# Patient Record
Sex: FEMALE | Race: WHITE | URBAN - METROPOLITAN AREA
[De-identification: names, ages, dates, MRNs, and addresses within clinical notes are randomized per-mention and may not be internally consistent; named-entity substitution may affect disease eponyms.]

---

## 2019-06-11 ENCOUNTER — OFFICE VISIT (OUTPATIENT)
Dept: FAMILY MEDICINE CLINIC | Age: 44
End: 2019-06-11
Payer: COMMERCIAL

## 2019-06-11 VITALS
SYSTOLIC BLOOD PRESSURE: 160 MMHG | HEIGHT: 65 IN | RESPIRATION RATE: 18 BRPM | DIASTOLIC BLOOD PRESSURE: 100 MMHG | BODY MASS INDEX: 48.82 KG/M2 | WEIGHT: 293 LBS | HEART RATE: 80 BPM | TEMPERATURE: 98.4 F | OXYGEN SATURATION: 98 %

## 2019-06-11 DIAGNOSIS — G89.29 CHRONIC RIGHT SHOULDER PAIN: Primary | ICD-10-CM

## 2019-06-11 DIAGNOSIS — M25.511 CHRONIC RIGHT SHOULDER PAIN: Primary | ICD-10-CM

## 2019-06-11 DIAGNOSIS — Z13.31 POSITIVE DEPRESSION SCREENING: ICD-10-CM

## 2019-06-11 DIAGNOSIS — D17.0 LIPOMA OF HEAD: ICD-10-CM

## 2019-06-11 DIAGNOSIS — L98.0 PYOGENIC GRANULOMA: ICD-10-CM

## 2019-06-11 DIAGNOSIS — R03.0 ELEVATED BP WITHOUT DIAGNOSIS OF HYPERTENSION: ICD-10-CM

## 2019-06-11 PROCEDURE — 99203 OFFICE O/P NEW LOW 30 MIN: CPT | Performed by: NURSE PRACTITIONER

## 2019-06-11 PROCEDURE — G0444 DEPRESSION SCREEN ANNUAL: HCPCS | Performed by: NURSE PRACTITIONER

## 2019-06-11 PROCEDURE — G8431 POS CLIN DEPRES SCRN F/U DOC: HCPCS | Performed by: NURSE PRACTITIONER

## 2019-06-11 RX ORDER — PREDNISONE 10 MG/1
TABLET ORAL
Qty: 20 TABLET | Refills: 0 | Status: SHIPPED | OUTPATIENT
Start: 2019-06-11 | End: 2019-06-21 | Stop reason: SDUPTHER

## 2019-06-11 ASSESSMENT — PATIENT HEALTH QUESTIONNAIRE - PHQ9
5. POOR APPETITE OR OVEREATING: 1
SUM OF ALL RESPONSES TO PHQ9 QUESTIONS 1 & 2: 6
SUM OF ALL RESPONSES TO PHQ QUESTIONS 1-9: 19
1. LITTLE INTEREST OR PLEASURE IN DOING THINGS: 3
SUM OF ALL RESPONSES TO PHQ QUESTIONS 1-9: 19
6. FEELING BAD ABOUT YOURSELF - OR THAT YOU ARE A FAILURE OR HAVE LET YOURSELF OR YOUR FAMILY DOWN: 3
4. FEELING TIRED OR HAVING LITTLE ENERGY: 3
10. IF YOU CHECKED OFF ANY PROBLEMS, HOW DIFFICULT HAVE THESE PROBLEMS MADE IT FOR YOU TO DO YOUR WORK, TAKE CARE OF THINGS AT HOME, OR GET ALONG WITH OTHER PEOPLE: 1
7. TROUBLE CONCENTRATING ON THINGS, SUCH AS READING THE NEWSPAPER OR WATCHING TELEVISION: 3
3. TROUBLE FALLING OR STAYING ASLEEP: 3
2. FEELING DOWN, DEPRESSED OR HOPELESS: 3
9. THOUGHTS THAT YOU WOULD BE BETTER OFF DEAD, OR OF HURTING YOURSELF: 0
8. MOVING OR SPEAKING SO SLOWLY THAT OTHER PEOPLE COULD HAVE NOTICED. OR THE OPPOSITE, BEING SO FIGETY OR RESTLESS THAT YOU HAVE BEEN MOVING AROUND A LOT MORE THAN USUAL: 0

## 2019-06-11 NOTE — PATIENT INSTRUCTIONS
your doctor may remove a lipoma if it is painful, gets infected, or bothers you. Follow-up care is a key part of your treatment and safety. Be sure to make and go to all appointments, and call your doctor if you are having problems. It's also a good idea to know your test results and keep a list of the medicines you take. How can you care for yourself at home? · A lipoma usually needs no care at home unless your doctor made a cut (incision) to remove it. · If your doctor told you how to care for your incision, follow your doctor's instructions. If you did not get instructions, follow this general advice:  ? Wash around the incision with clean water 2 times a day. Don't use hydrogen peroxide or alcohol. These can slow healing. ? You may cover the incision with a thin layer of petroleum jelly, such as Vaseline, and a nonstick bandage. ? Apply more petroleum jelly and replace the bandage as needed. When should you call for help? Call your doctor now or seek immediate medical care if:    · You have signs of infection, such as:  ? Increased pain, swelling, warmth, or redness. ? Red streaks leading from the lipoma. ? Pus draining from the lipoma. ? A fever.    Watch closely for changes in your health, and be sure to contact your doctor if:    · The lipoma is growing or changing.     · You do not get better as expected. Where can you learn more? Go to https://Attributor.[employer]. org and sign in to your Eayun account. Enter Z234 in the ZapMe box to learn more about \"Lipoma: Care Instructions. \"     If you do not have an account, please click on the \"Sign Up Now\" link. Current as of: April 17, 2018  Content Version: 12.0  © 4322-3066 Healthwise, Incorporated. Care instructions adapted under license by [facility] [facility] ([facility]).  If you have questions about a medical condition or this instruction, always ask your healthcare professional. Geremias Parrish disclaims any warranty or liability for your use of this information. Patient Education        Learning About Benign Soft Tissue Tumors  What is a benign soft tissue tumor? A soft tissue tumor is a growth of abnormal cells in the body's soft tissues. These tissues include the muscles, lymph and blood vessels, nerves, and fat. They can also include cartilage and other connective tissues. When a tumor is benign (say \"oleg-NYN\"), that means it's not cancer. Most soft tissue tumors are benign. Benign tumors don't spread to other tissues and organs. They usually aren't life-threatening. But they can cause problems if they grow too much, press on nerves, or cause pain. What are some common types of these tumors? Some common types of benign soft tissue tumors include:  Lipomas. These tumors form from fat cells. Angiolipomas are a type of lipoma made up of fat and blood vessels. Nerve sheath tumors. Tumors on a nerve may include schwannomas and neurofibromas. They might need to be removed. Benign synovial tumors. These appear around the tendons, near the knee, hip, elbow, or shoulder. Examples include giant cell tumor of the tendon sheath and synovial chondromatosis. Hemangiomas. These are tumors of the blood vessels. Desmoid tumors. These tumors commonly appear on the shoulder, chest, back, and thighs. Nodular fasciitis. These are most common in the arms. They can grow quickly. Other types of tumors may appear on the skin, belly, arms and legs, organs, and nerves. What are the symptoms? Sometimes a tumor can be felt as a bump under the skin. Or if the tumor is deep enough below the skin, you may not be able to feel it. You may also feel pain near the tumor if it's large or pressing on something. How are these tumors diagnosed? Your doctor will ask you about your symptoms and past health and will examine you. A physical exam can help your doctor diagnose some soft tissue tumors.   Your doctor may find a tumor when taking X-rays or other imaging tests for another problem. If your doctor isn't sure what the growth is and your symptoms could be signs of a tumor, you will get some tests. The tests can help make sure it's not cancer. They can also help your doctor figure out the best treatment for the tumor. · You may have one or more imaging tests to get a better look at the tumor. These may include:  ? X-rays. ? Ultrasound tests. ? CT scan.  ? MRI scan. · You may need blood tests and lab work. · You may need a biopsy so a sample of the tumor can be looked at under a microscope. This sample may also be used to test for biomarkers. They will help with planning treatment. Doctors may also look at other parts of your body for other tumors. How are they treated? Some benign soft tissue tumors that aren't causing problems can be watched over time. Some may remain stable or go away on their own. But if the tumor causes pain, is growing larger, or affects your movement, it may need to be removed. You may also choose to have these tumors removed if they bother you or if you don't like how they look. Doctors may remove some tumors with surgery. In some cases, other treatments, including medicines, may be used. Talk with your doctor or specialist about other types of treatments for the tumor. After your treatment, your doctor may want to check the area again to make sure that the growth doesn't come back. Follow-up care is a key part of your treatment and safety. Be sure to make and go to all appointments, and call your doctor if you are having problems. It's also a good idea to know your test results and keep a list of the medicines you take. Where can you learn more? Go to https://Crocodile Goldmanav.[employer]. org and sign in to your [employer] account. Enter S210 in the [employer] box to learn more about \"Learning About Benign Soft Tissue Tumors. \"     If you do not have an account, please click on the \"Sign Up of a heart attack or stroke. But taking aspirin isn't right for everyone, because it can cause serious bleeding. · See your doctor regularly. You may need to see the doctor more often at first or until your blood pressure comes down. · If you are taking blood pressure medicine, talk to your doctor before you take decongestants or anti-inflammatory medicine, such as ibuprofen. Some of these medicines can raise blood pressure. · Learn how to check your blood pressure at home. Lifestyle changes  · Stay at a healthy weight. This is especially important if you put on weight around the waist. Losing even 10 pounds can help you lower your blood pressure. · If your doctor recommends it, get more exercise. Walking is a good choice. Bit by bit, increase the amount you walk every day. Try for at least 30 minutes on most days of the week. You also may want to swim, bike, or do other activities. · Avoid or limit alcohol. Talk to your doctor about whether you can drink any alcohol. · Try to limit how much sodium you eat to less than 2,300 milligrams (mg) a day. Your doctor may ask you to try to eat less than 1,500 mg a day. · Eat plenty of fruits (such as bananas and oranges), vegetables, legumes, whole grains, and low-fat dairy products. · Lower the amount of saturated fat in your diet. Saturated fat is found in animal products such as milk, cheese, and meat. Limiting these foods may help you lose weight and also lower your risk for heart disease. · Do not smoke. Smoking increases your risk for heart attack and stroke. If you need help quitting, talk to your doctor about stop-smoking programs and medicines. These can increase your chances of quitting for good. When should you call for help? Call 911 anytime you think you may need emergency care. This may mean having symptoms that suggest that your blood pressure is causing a serious heart or blood vessel problem.  Your blood pressure may be over 180/120.   For example, call 911 if:    · You have symptoms of a heart attack. These may include:  ? Chest pain or pressure, or a strange feeling in the chest.  ? Sweating. ? Shortness of breath. ? Nausea or vomiting. ? Pain, pressure, or a strange feeling in the back, neck, jaw, or upper belly or in one or both shoulders or arms. ? Lightheadedness or sudden weakness. ? A fast or irregular heartbeat.     · You have symptoms of a stroke. These may include:  ? Sudden numbness, tingling, weakness, or loss of movement in your face, arm, or leg, especially on only one side of your body. ? Sudden vision changes. ? Sudden trouble speaking. ? Sudden confusion or trouble understanding simple statements. ? Sudden problems with walking or balance. ? A sudden, severe headache that is different from past headaches.     · You have severe back or belly pain.    Do not wait until your blood pressure comes down on its own. Get help right away.   Call your doctor now or seek immediate care if:    · Your blood pressure is much higher than normal (such as 180/120 or higher), but you don't have symptoms.     · You think high blood pressure is causing symptoms, such as:  ? Severe headache.  ? Blurry vision.    Watch closely for changes in your health, and be sure to contact your doctor if:    · Your blood pressure measures higher than your doctor recommends at least 2 times. That means the top number is higher or the bottom number is higher, or both.     · You think you may be having side effects from your blood pressure medicine. Where can you learn more? Go to https://Brightleafmanav.Meetapp. org and sign in to your 500Indies account. Enter Z888 in the KylesP2Binvestor box to learn more about \"High Blood Pressure: Care Instructions. \"     If you do not have an account, please click on the \"Sign Up Now\" link. Current as of: July 22, 2018  Content Version: 12.0  © 9108-4261 Healthwise, Incorporated.  Care instructions adapted under license by [facility] ([facility]). If you have questions about a medical condition or this instruction, always ask your healthcare professional. [address] any warranty or liability for your use of this information.

## 2019-06-11 NOTE — PROGRESS NOTES
SUBJECTIVE:    Patient ID: Mone Sanchez is a 37 y.o. y.o. female. HPI  Chief Complaint   Patient presents with    Established New Doctor     NEW PT- ESTABLISH,    Shoulder Pain     RIGHT SHOULDER PAIN, ATTEMPTED TO  DOG AND FEELS LIKE SOMETHING PULLED, PAIN HAS GONE DONW BUT IS STILL THERE, HAPPENED MID MARCH    Cyst     CYST- HEAD AND BEHIND THE EAR, HAVE BEEN THERE FOR YEARS    HER SISTER  RECENTLY PASSED 5/31- - HAS HAD SOME DEPRESSION IN THE PAST -TRIED SEVERAL MEDICATIONS IN THE PAST  PAXIL- LEXAPRO- CELEXA - [facility] - [facility]- [facility]- THEY  MADE THINGS WORSE- SHE DOES NOT NEED ANY MEDICATION AT THIS TIME AS THE DEPRESSION IS SITUATIONAL-        RIGHT SHOULDER PAIN FOR THE LAST ABOUT 2.5 MONTHS - SHE PICKED HER HER DOG 60 POUNDS UP AND THAT IS WHEN SHE NOTICED THE PAIN- ACHING PAIN WORSE WITH MOVEMENTS PUTTING BRA BRA ON REACHING BEHIND HER BACK - SHE FEELS A JABBING PAIN - RATES THE  PAIN 6/10 WHEN REACHES BEHIND HER BACK- SHE HAS TRIED A BICEPS BRACE THAT HAS SEEMED TO HELP-      SHE HAS A CYST BEHIND HER LEFT EAR SINCE CHILDHOOD - HAS GOTTEN BIGGER  THE LAST FEW YEARS- IT DOES BLEED AT TIMES NOT TENDER TO TOUCH  Review of Systems - ONE ON TP OF HER HEAD GETS TENDER AT TIMES - PRESENT FOR SEVERAL YEARS - IT HAS GOTTEN BIGGER WAS THE SIZE OF A PEA NOW THE SIZE OF A GLENYS  OBJECTIVE:  Vitals:    06/11/19 0851   BP: (!) 140/90   Pulse: 80   Resp: 18   Temp: 98.4 °F (36.9 °C)   SpO2: 98%     Past Medical History:   Diagnosis Date    Anxiety     Depression     Hypertension     PCOS (polycystic ovarian syndrome)       Family History   Problem Relation Age of Onset    Diabetes Mother     Dementia Mother     High Blood Pressure Father     Pancreatic Cancer Father     Coronary Art Dis Sister         CHF    Pancreatic Cancer Sister         PULM HTN    Cancer Brother 29        BRAIN TUMOR          Physical Exam   Constitutional: She appears well-developed. She is active.    MORBIDLY OBESE Cardiovascular: Normal rate, regular rhythm, S1 normal, S2 normal and normal heart sounds. Exam reveals no gallop, no S3, no S4, no distant heart sounds and no friction rub. No murmur heard. No systolic murmur is present. No diastolic murmur is present. Pulmonary/Chest: Effort normal and breath sounds normal.   Neurological: She is alert. Psychiatric: She has a normal mood and affect. Her speech is normal and behavior is normal. Judgment and thought content normal. Cognition and memory are normal.       Halle Nevarez was seen today for established new doctor, shoulder pain and cyst.    Diagnoses and all orders for this visit:    Chronic right shoulder pain  -     XR SHOULDER RIGHT (MIN 2 VIEWS); Future  -     Ambulatory referral to Physical Therapy  -     predniSONE (DELTASONE) 10 MG tablet; 4 tabs x 2 d 3 tabs x 2 d 2 tabs x 2 d 1  tab x 2 d in am with food   avoid NSAIDs while on this medication   Apply cold compresses intermittently as needed. As pain recedes, begin normal activities slowly as tolerated. Call if symptoms persist.    Elevated BP without diagnosis of hypertension  PT IS ENCOURAGED TO MONITOR B/P AT LEAST WEEKLY-  INSTRUCTED TO MAKE AN APPT FOR CPE- WILL RECHECK B/P AT THAT TIME  ENCOURAGED PT TO TRY TO LOSE WEIGHT AS THIS MAY HELP AS WELL  Lipoma of head  Pyogenic granuloma  PT SCHEDULED TO HAVE LESIONS REMOVED WITH DR MONTALVO 6//21    Positive depression screening  -     Positive Screen for Clinical Depression with a Documented Follow-up Plan   On the basis of positive PHQ-9 screening (PHQ-9 Total Score: 19), the following plan was implemented: patient declines further evaluation/treatment for depression. Patient will follow-up in 1 month(s) with PCP. Linda Flores

## 2019-06-21 ENCOUNTER — OFFICE VISIT (OUTPATIENT)
Dept: FAMILY MEDICINE CLINIC | Age: 44
End: 2019-06-21
Payer: COMMERCIAL

## 2019-06-21 VITALS — HEIGHT: 65 IN | BODY MASS INDEX: 63.59 KG/M2

## 2019-06-21 DIAGNOSIS — D18.01 HEMANGIOMA OF SKIN: Primary | ICD-10-CM

## 2019-06-21 DIAGNOSIS — L72.11 PILAR CYST: ICD-10-CM

## 2019-06-21 PROCEDURE — 11102 TANGNTL BX SKIN SINGLE LES: CPT | Performed by: FAMILY MEDICINE

## 2019-06-21 PROCEDURE — 11420 EXC H-F-NK-SP B9+MARG 0.5/<: CPT | Performed by: FAMILY MEDICINE

## 2019-06-21 RX ORDER — PREDNISONE 10 MG/1
TABLET ORAL
Qty: 20 TABLET | Refills: 0 | Status: SHIPPED | OUTPATIENT
Start: 2019-06-21 | End: 2019-07-17

## 2019-06-27 ENCOUNTER — HOSPITAL ENCOUNTER (OUTPATIENT)
Dept: MAMMOGRAPHY | Age: 44
Discharge: HOME | End: 2019-07-02
Payer: COMMERCIAL

## 2019-06-27 DIAGNOSIS — Z12.31 VISIT FOR SCREENING MAMMOGRAM: ICD-10-CM

## 2019-06-27 PROCEDURE — 77067 SCR MAMMO BI INCL CAD: CPT

## 2019-06-28 ENCOUNTER — NURSE ONLY (OUTPATIENT)
Dept: FAMILY MEDICINE CLINIC | Age: 44
End: 2019-06-28

## 2019-06-28 DIAGNOSIS — Z48.02 VISIT FOR SUTURE REMOVAL: Primary | ICD-10-CM

## 2019-06-29 ENCOUNTER — HOSPITAL ENCOUNTER (OUTPATIENT)
Dept: PHYSICAL THERAPY | Age: 44
Setting detail: THERAPIES SERIES
Discharge: HOME | End: 2019-06-29
Payer: COMMERCIAL

## 2019-06-29 ENCOUNTER — HOSPITAL ENCOUNTER (OUTPATIENT)
Dept: GENERAL RADIOLOGY | Age: 44
Discharge: HOME | End: 2019-06-29
Payer: COMMERCIAL

## 2019-06-29 ENCOUNTER — HOSPITAL ENCOUNTER (OUTPATIENT)
Age: 44
Discharge: HOME | End: 2019-06-29
Payer: COMMERCIAL

## 2019-06-29 DIAGNOSIS — G89.29 CHRONIC RIGHT SHOULDER PAIN: ICD-10-CM

## 2019-06-29 DIAGNOSIS — M25.511 CHRONIC RIGHT SHOULDER PAIN: ICD-10-CM

## 2019-06-29 PROCEDURE — 97530 THERAPEUTIC ACTIVITIES: CPT

## 2019-06-29 PROCEDURE — 73030 X-RAY EXAM OF SHOULDER: CPT

## 2019-06-29 PROCEDURE — 97161 PT EVAL LOW COMPLEX 20 MIN: CPT

## 2019-06-29 PROCEDURE — 97110 THERAPEUTIC EXERCISES: CPT

## 2019-06-29 NOTE — PROGRESS NOTES
Physical Therapy  Initial Assessment  Date: 2019  Patient Name: Miguel Angel Delvalle   MRN: [id number]  : 1975     Treatment Diagnosis: R shoulder and lateral arm pain, decreased R shoulder ROM and strength, poor posture, difficulty with upper body dressing (specifically clasping bra) and overhead tasks, decreased endurance with R shoulder     Restrictions  Position Activity Restriction  Other position/activity restrictions: N/A  Outpatient fall risk assessment completed asking screening question if patient has fallen in the past 30 days:  [x] Yes  [] No    Based on screen for falls, patient demonstrates fall risk:  [] Yes  [x] No    Interventions based on fall risk status:  Updated Problem List within Medical History  [] Yes   [x] N/A    Asked family to assist with increased observation of the patient  [] Yes   [x] N/A    Patient kept in visible area when not closely supervised by therapist  [] Yes   [x] N/A    Repeatedly reinforce activity limits and safety needs with patient/family  [] Yes   [x] N/A    Increase frequency of rounding/monitoring patient  [] Yes   [x] N/A        Subjective   General  Chart Reviewed: Yes  Patient assessed for rehabilitation services?: Yes  Additional Pertinent Hx: Anxiety, depression  Family / Caregiver Present: No  Referring Practitioner: RADHA Castillo  Referral Date : 19  Diagnosis: Chronic right shoulder pain  General Comment  Comments: Does have an order for an Xray but has not scheduled it yet  PT Visit Information  Onset Date: (2019)  PT Insurance Information: [facility] [employer] - no visit limit, $35 copay  Subjective  Subjective: Current: Pt reports the shoulder pain started at the end of March. She was taking care of her sick dog (weighed about 60#). She got a ripping sensation at one point, but worked through it and never went to the MD. She finally made an MD appointment The pain has improved slightly but has hit a plateau.  PLOF: No hx of shoulder of pain, hx of tendinitis in R elbow. Independent with all activities. CLOF: Having difficulty with lifting >30#, overhead reaching, clasping bra behind her back. Works as an  which is mostly desk work but occasionally has to lift a box onto a scale to prepare for shipping or reaching a file on a top shelf. R hand dominant. Occasionally dropping pens/pencils. Crochets recreationally, has been wearing a band around the shoulder and just distal to the elbow to complete, but then it hurts for long periods afterwards. Pain: She has radiating discomfort into the lateral upper arm, occasionally gets a burning sensation. Pain is a steady 5/10, with moments of higher pain when she reaches or lifts. Was on a course of prednisone, reports little to no relief. No other treatments for the pain at home.         Vision/Hearing  Vision  Vision: Within Functional Limits  Hearing  Hearing: Within functional limits    Orientation  Orientation  Overall Orientation Status: Within Normal Limits    Social/Functional History  Social/Functional History  Lives With: Alone  Type of Home: House  Home Layout: Two level(laundry on first floor)  Home Access: Stairs to enter without rails  Entrance Stairs - Number of Steps: 1  ADL Assistance: Independent(Having pain with some dressing (clasping bra))  Homemaking Assistance: Independent(able to complete but using L UE more often)  Active : Yes  Mode of Transportation: Car  Occupation: Full time employment  Type of occupation:   Leisure & Hobbies: Shabana, crafting     Objective     Observation/Palpation  Posture: Poor  Palpation: no significant tenderness with palpation of R shoulder   Observation: Rounded shoulders, forward head, poor sitting posture    PROM RUE (degrees)  RUE General PROM: to onset of pain:  R Shoulder Flex  0-180: 120 deg  R Shoulder ABduction 0-180: 90 deg  R Shoulder Int Rotation  0-70: WNL - pain at end range  R Shoulder Ext Rotation  0-90: WNL - no pain  AROM RUE (degrees)  R Shoulder Flexion 0-180: 112 deg  R Shoulder Extension 0-45: 48 deg   R Shoulder ABduction 0-180: 109 deg  R Shoulder Int Rotation  0-70: symmetrical to L UE behind back with reports of a \"pull\" in the top of the shoulder. R Shoulder Ext Rotation 0-90: 50 deg  R Elbow Flexion 0-145: WNL - slight pain along tricep  R Elbow Extension 145-0: WNL - slight pain along tricep  AROM LUE (degrees)  LUE AROM : WFL  LUE General AROM: IR to mid scapular region, ER to C7-T1    Strength RUE  R Shoulder Flexion: 4/5  R Shoulder ABduction: 4/5  R Shoulder Internal Rotation: 4/5  R Shoulder External Rotation: 4/5  R Shoulder Horizontal ABduction: 4/5  R Elbow Flexion: 4+/5  R Elbow Extension: 4+/5  Strength LUE  L Shoulder Flexion: 4+/5  L Shoulder ABduction: 4/5  L Shoulder Internal Rotation: 4+/5  L Shoulder External Rotation: 4+/5  L Shoulder Horizontal ABduction: 4/5  L Elbow Flexion: 4+/5  L Elbow Extension: 4+/5     Additional Measures  Special Tests: (+) Empty Can test on R (pain)  Sensation  Overall Sensation Status: WNL     Hand Dominance  Hand Dominance: Right  Left Hand Strength -  (lbs)  Handle Setting 2: 75#, 72#, 65# (avg 70.7#)  Right Hand Strength -  (lbs)  Handle Setting 2: 68#, 70#, 68# (avg 68.7#)    Assessment   Conditions Requiring Skilled Therapeutic Intervention  Body structures, Functions, Activity limitations: Decreased functional mobility ; Decreased ROM; Decreased strength; Increased Pain  Assessment: Pt is a 37year old female referred to physical therapy for right shoulder pain, AUNDREA was lifting her 60# dog and felt a \"ripping\" sensation. She presents to PT today with a decrease in AROM and strength of the R shoulder, as well as poor posture and mild decrease in R  strength. These deficits are impacting her ability to complete daily and recreational activities and therefore affecting her quality of life.  She did have a positive Empty Can test, but would benefit from further testing at her next therapy visit. She will benefit from a course of physical therapy to addres the abvove deficits and return to PLOF. Treatment Diagnosis: R shoulder and lateral arm pain, decreased R shoulder ROM and strength, poor posture, difficulty with upper body dressing (specifically clasping bra) and overhead tasks, decreased endurance with R shoulder   Prognosis: Good  Decision Making: Low Complexity  REQUIRES PT FOLLOW UP: Yes  Activity Tolerance  Activity Tolerance: Patient Tolerated treatment well         Plan   Plan  Times per week: 1x  Plan weeks: 6 weeks  Current Treatment Recommendations: Strengthening, ROM, Manual Therapy - Joint Manipulation, Neuromuscular Re-education, Pain Management, Home Exercise Program, Manual Therapy - Soft Tissue Mobilization      OutComes Score                 QuickDASH Total Score: 26 (06/29/19 1213)       QuickDASH Disability/Symptom Score : 34.09 % (06/29/19 1213)                      Goals  Long term goals  Time Frame for Long term goals : 6 weeks  Long term goal 1: Pt to increase R shoulder AROM flex and abd to 140 deg painfree for improved ability to complete OH motions. Long term goal 2: Pt to increase gross R shoulder strength to 4+/5 for improved ability to lift boxes at work. Long term goal 3: Pt to demo good resting posture without verbal cueing for reduced strain on tissues and lowered pain levels. Long term goal 4: Pt to return to hobbies for 1.5 hrs at a time without increase in pain above 2/10.    Patient Goals   Patient goals : Get back to hobbies without pain       [place] Sophia, [address] [facility] [facility] [address]

## 2019-06-29 NOTE — FLOWSHEET NOTE
Physical Therapy Daily Treatment Note  Date:  2019    Patient Name:  Paco Serrano    :  1975  MRN: [id number]  Restrictions/Precautions:    Medical/Treatment Diagnosis Information:   Diagnosis: Chronic right shoulder pain  Treatment Diagnosis: R shoulder and lateral arm pain, decreased R shoulder ROM and strength, poor posture, difficulty with upper body dressing (specifically clasping bra) and overhead tasks, decreased endurance with R shoulder     Tracking Information:  Physician Information Referring Practitioner: RADHA Rosales     Plan of Care Sent Date: 19 Signed Received:    Visit Count / Total Visits      Insurance Approved Visits  med necessity  Approved Dates:     Insurance Information PT Insurance Information: Vishal Arizmendis - no visit limit, $35 copay     Progress Note/G-codes   [x]  Yes  []  No Next Due:  visit     Pain level: 5/10     Subjective:  See eval    Objective:   Observation:   Test measurements:  Next visit - perform additional RTC testing     Exercises:  Exercise/Equipment Resistance/Repetitions Other comments   UBE     Pulleys     TB     Wall activities for ROM     Scap strengthening/posture re-ed                                                     Other Therapeutic Activities:    19 Pt was educated on PT POC, Diagnosis, Prognosis, pathomechanics as well as frequency and duration of scheduling future physical therapy appointments. Time was also taken on this day to answer all patient questions and participation in PT. Home Exercise Program:    19 Patient was educated on home exercise program including distrubution of handout describing exercises, sets, repetitions, frequency and intensity. Exercises/activities include: scap retraction, doorway stretch, wall walks for shoulder flexion.      Manual Treatments:      Modalities:      Timed Code Treatment Minutes:  39    Total Treatment Minutes:  53    Treatment/Activity Tolerance:  [x] Patient tolerated treatment well [] Patient limited by fatigue  [] Patient limited by pain  [] Patient limited by other medical complications  [] Other:     Prognosis: [x] Good [] Fair  [] Poor    Patient Requires Follow-up: [x] Yes  [] No    Plan:   [] Continue per plan of care [] Alter current plan (see comments)  [x] Plan of care initiated [] Hold pending MD visit [] Discharge  Plan for Next Session:  See above    Electronically signed by:  Rd Ritchie PT , DPT

## 2019-07-06 ENCOUNTER — HOSPITAL ENCOUNTER (OUTPATIENT)
Dept: PHYSICAL THERAPY | Age: 44
Setting detail: THERAPIES SERIES
Discharge: HOME | End: 2019-07-06
Payer: COMMERCIAL

## 2019-07-06 PROCEDURE — 97110 THERAPEUTIC EXERCISES: CPT

## 2019-07-06 NOTE — FLOWSHEET NOTE
was educated on PT POC, Diagnosis, Prognosis, pathomechanics as well as frequency and duration of scheduling future physical therapy appointments. Time was also taken on this day to answer all patient questions and participation in PT. Home Exercise Program:    7/6: Added HO with Mid rows, high rows, and LPD with lime green TB. Also added   6/29/19 Patient was educated on home exercise program including distrubution of handout describing exercises, sets, repetitions, frequency and intensity. Exercises/activities include: scap retraction, doorway stretch, wall walks for shoulder flexion. Manual Treatments:      Modalities:      Timed Code Treatment Minutes:  27    Total Treatment Minutes:  27    Treatment/Activity Tolerance:  [x] Patient tolerated treatment well [] Patient limited by fatigue  [] Patient limited by pain  [] Patient limited by other medical complications  [x] Other: No c/o increased pain during therex, only fatigue and mild ache. Please update HEP each visit as pt only coming 1x/wk.      Prognosis: [x] Good [] Fair  [] Poor    Patient Requires Follow-up: [x] Yes  [] No    Plan:   [x] Continue per plan of care [] Alter current plan (see comments)  [] Plan of care initiated [] Hold pending MD visit [] Discharge  Plan for Next Session:  See above    Electronically signed by:  Venkatesh Mahoney, PT , DPT

## 2019-07-13 ENCOUNTER — HOSPITAL ENCOUNTER (OUTPATIENT)
Dept: PHYSICAL THERAPY | Age: 44
Setting detail: THERAPIES SERIES
Discharge: HOME | End: 2019-07-13
Payer: COMMERCIAL

## 2019-07-13 PROCEDURE — 97110 THERAPEUTIC EXERCISES: CPT

## 2019-07-17 ENCOUNTER — OFFICE VISIT (OUTPATIENT)
Dept: FAMILY MEDICINE CLINIC | Age: 44
End: 2019-07-17
Payer: COMMERCIAL

## 2019-07-17 VITALS
BODY MASS INDEX: 48.82 KG/M2 | SYSTOLIC BLOOD PRESSURE: 130 MMHG | TEMPERATURE: 99.1 F | WEIGHT: 293 LBS | HEIGHT: 65 IN | OXYGEN SATURATION: 98 % | RESPIRATION RATE: 18 BRPM | DIASTOLIC BLOOD PRESSURE: 70 MMHG | HEART RATE: 80 BPM

## 2019-07-17 DIAGNOSIS — E66.01 MORBID OBESITY (HCC): ICD-10-CM

## 2019-07-17 DIAGNOSIS — Z23 NEED FOR TDAP VACCINATION: ICD-10-CM

## 2019-07-17 DIAGNOSIS — Z13.1 DIABETES MELLITUS SCREENING: ICD-10-CM

## 2019-07-17 DIAGNOSIS — Z13.220 LIPID SCREENING: ICD-10-CM

## 2019-07-17 DIAGNOSIS — Z00.00 ANNUAL PHYSICAL EXAM: ICD-10-CM

## 2019-07-17 LAB
A/G RATIO: 1.5 (ref 1.1–2.2)
ALBUMIN SERPL-MCNC: 3.9 G/DL (ref 3.4–5)
ALP BLD-CCNC: 88 U/L (ref 40–129)
ALT SERPL-CCNC: 17 U/L (ref 10–40)
ANION GAP SERPL CALCULATED.3IONS-SCNC: 12 MMOL/L (ref 3–16)
AST SERPL-CCNC: 13 U/L (ref 15–37)
BILIRUB SERPL-MCNC: 0.4 MG/DL (ref 0–1)
BUN BLDV-MCNC: 8 MG/DL (ref 7–20)
CALCIUM SERPL-MCNC: 8.9 MG/DL (ref 8.3–10.6)
CHLORIDE BLD-SCNC: 105 MMOL/L (ref 99–110)
CHOLESTEROL, TOTAL: 156 MG/DL (ref 0–199)
CO2: 24 MMOL/L (ref 21–32)
CREAT SERPL-MCNC: <0.5 MG/DL (ref 0.6–1.1)
GFR AFRICAN AMERICAN: >60
GFR NON-AFRICAN AMERICAN: >60
GLOBULIN: 2.6 G/DL
GLUCOSE BLD-MCNC: 110 MG/DL (ref 70–99)
HDLC SERPL-MCNC: 49 MG/DL (ref 40–60)
LDL CHOLESTEROL CALCULATED: 91 MG/DL
POTASSIUM SERPL-SCNC: 4.4 MMOL/L (ref 3.5–5.1)
SODIUM BLD-SCNC: 141 MMOL/L (ref 136–145)
TOTAL PROTEIN: 6.5 G/DL (ref 6.4–8.2)
TRIGL SERPL-MCNC: 78 MG/DL (ref 0–150)
VLDLC SERPL CALC-MCNC: 16 MG/DL

## 2019-07-17 PROCEDURE — 36415 COLL VENOUS BLD VENIPUNCTURE: CPT | Performed by: NURSE PRACTITIONER

## 2019-07-17 PROCEDURE — 99396 PREV VISIT EST AGE 40-64: CPT | Performed by: NURSE PRACTITIONER

## 2019-07-20 ENCOUNTER — HOSPITAL ENCOUNTER (OUTPATIENT)
Dept: PHYSICAL THERAPY | Age: 44
Setting detail: THERAPIES SERIES
Discharge: HOME | End: 2019-07-20
Payer: COMMERCIAL

## 2019-07-20 PROCEDURE — 97110 THERAPEUTIC EXERCISES: CPT

## 2019-07-20 NOTE — FLOWSHEET NOTE
Physical Therapy Daily Treatment Note  Date:  2019    Patient Name:  Milena Nogueira    :  1975  MRN: [id number]  Restrictions/Precautions:    Medical/Treatment Diagnosis Information:   Diagnosis: Chronic right shoulder pain  Treatment Diagnosis: R shoulder and lateral arm pain, decreased R shoulder ROM and strength, poor posture, difficulty with upper body dressing (specifically clasping bra) and overhead tasks, decreased endurance with R shoulder     Tracking Information:  Physician Information Referring Practitioner: RADHA Carmichael     Plan of Care Sent Date: 19 Signed Received: 19   Visit Count / Total Visits      Insurance Approved Visits  med necessity  Approved Dates:     Insurance Information PT Insurance Information: Eddi Edwards - no visit limit, $35 copay     Progress Note/G-codes   []  Yes  [x]  No Next Due: 10th visit     Pain level: 1-2/10     Subjective:  Pt reports her shoulder is achy still but easier getting dressed. Feels like she is improving slowly but surely. Still has difficulty with reaching up and out like putting dishes away. Does ben a lot.        Objective:   Observation:   Test measurements: : External rotation lag sign (supraspinatus/infraspinatus tear): Negative, but did have pain; Lift-off test (subscapularis tear): negative but slight pain; Bear hug test (subscapularis tear): negative & no pain; Drop arm test (supraspinatus tear): negative & no pain   AAROM flex (wall slide) 122 deg    Exercises:  Exercise/Equipment Resistance/Repetitions Other comments   UBE 2 min fwd/2 min retro    Pulleys  Single arm ER stretch       TB Blue band:  Mid rows 3 x 10    Bilateral ER 3 x 10  GHJ ext 3 x 10      Wall activities for ROM/strength     Gold ball with R UE in 90 deg abd: up/down, side/side, cw, ccw x 20 ea    Scap strengthening/posture re-ed     1/2 foam roll along spine with scap retract and CT x 10 with 5 sec holds     1/2

## 2019-07-27 ENCOUNTER — HOSPITAL ENCOUNTER (OUTPATIENT)
Dept: PHYSICAL THERAPY | Age: 44
Setting detail: THERAPIES SERIES
Discharge: HOME | End: 2019-07-27
Payer: COMMERCIAL

## 2019-07-27 PROCEDURE — 97140 MANUAL THERAPY 1/> REGIONS: CPT

## 2019-07-27 PROCEDURE — 97110 THERAPEUTIC EXERCISES: CPT

## 2019-08-03 ENCOUNTER — HOSPITAL ENCOUNTER (OUTPATIENT)
Dept: PHYSICAL THERAPY | Age: 44
Setting detail: THERAPIES SERIES
Discharge: HOME | End: 2019-08-03
Payer: COMMERCIAL

## 2019-08-03 PROCEDURE — 97110 THERAPEUTIC EXERCISES: CPT

## 2019-08-03 NOTE — DISCHARGE SUMMARY
Outpatient Physical Therapy    Phone: [phone] Fax: [phone]    Physical Therapy Discharge Note  Date: 8/3/2019        Patient Name:  Daniele Cat    :  1975  MRN: [id number]  Restrictions/Precautions:      Medical/Treatment Diagnosis Information:  Diagnosis: Chronic right shoulder pain  Treatment Diagnosis: R shoulder and lateral arm pain, decreased R shoulder ROM and strength, poor posture, difficulty with upper body dressing (specifically clasping bra) and overhead tasks, decreased endurance with R shoulder   Insurance/Certification information:  PT Insurance Information: Farheen Villanueva [id number] - no visit limit, $35 copay  Physician Information:  Referring Practitioner: RADHA Bernardo  Plan of care signed (Y/N): Y   Visit# / total visits:    Pain level: 0/10     Time Period for Report: 19 - 8/3/19  Cancels/No-shows to date:  0    Plan of Care/Treatment to date:  X? Therapeutic Exercise      X? Modalities:  X? Therapeutic Activity       ? Ultrasound    ? Gait Training        ? Cervical Traction   X? Neuromuscular Re-education      X? Cold/hotpack    X? Instruction in HEP        ? Lumbar Traction  X? Manual Therapy        ? Electrical Stimulation            ? Aquatic Therapy        ? Iontophoresis            ? Lymphedema management  ? Women's Health     Other:  ? Vestibular Rehab        ?    ?  Needed                        Significant Findings At Last Visit/Comments:    Subjective:  Subjective  Subjective: Pt does feel like she has made good progress with therapy, feels about 90% better and would be comfortable DC'ing today. Pt is now able to ben for 1.5-2 hrs without increase in pain. She is still getting a slight twinge when reaching up into a cabinet but much better than before.           Objective:         Observation/Palpation  Posture: Poor  Observation: Rounded shoulders, forward head, poor sitting posture  AROM RUE (degrees)  R Shoulder

## 2022-03-24 ENCOUNTER — OFFICE VISIT (OUTPATIENT)
Dept: FAMILY MEDICINE CLINIC | Age: 47
End: 2022-03-24
Payer: COMMERCIAL

## 2022-03-24 VITALS
HEART RATE: 80 BPM | BODY MASS INDEX: 48.82 KG/M2 | WEIGHT: 293 LBS | OXYGEN SATURATION: 100 % | TEMPERATURE: 97.8 F | DIASTOLIC BLOOD PRESSURE: 92 MMHG | HEIGHT: 65 IN | SYSTOLIC BLOOD PRESSURE: 144 MMHG

## 2022-03-24 DIAGNOSIS — Z13.31 POSITIVE DEPRESSION SCREENING: ICD-10-CM

## 2022-03-24 DIAGNOSIS — M25.562 ACUTE PAIN OF LEFT KNEE: ICD-10-CM

## 2022-03-24 DIAGNOSIS — Z23 NEED FOR TDAP VACCINATION: ICD-10-CM

## 2022-03-24 DIAGNOSIS — Z12.11 COLON CANCER SCREENING: ICD-10-CM

## 2022-03-24 DIAGNOSIS — F32.2 CURRENT SEVERE EPISODE OF MAJOR DEPRESSIVE DISORDER WITHOUT PSYCHOTIC FEATURES, UNSPECIFIED WHETHER RECURRENT (HCC): ICD-10-CM

## 2022-03-24 DIAGNOSIS — Z13.1 DIABETES MELLITUS SCREENING: ICD-10-CM

## 2022-03-24 DIAGNOSIS — Z13.220 LIPID SCREENING: ICD-10-CM

## 2022-03-24 DIAGNOSIS — E66.01 MORBID OBESITY WITH BMI OF 60.0-69.9, ADULT (HCC): ICD-10-CM

## 2022-03-24 DIAGNOSIS — R53.83 FATIGUE, UNSPECIFIED TYPE: Primary | ICD-10-CM

## 2022-03-24 LAB
A/G RATIO: 1.4 (ref 1.1–2.2)
ALBUMIN SERPL-MCNC: 4.1 G/DL (ref 3.4–5)
ALP BLD-CCNC: 96 U/L (ref 40–129)
ALT SERPL-CCNC: 17 U/L (ref 10–40)
ANION GAP SERPL CALCULATED.3IONS-SCNC: 15 MMOL/L (ref 3–16)
AST SERPL-CCNC: 13 U/L (ref 15–37)
BILIRUB SERPL-MCNC: 0.4 MG/DL (ref 0–1)
BUN BLDV-MCNC: 10 MG/DL (ref 7–20)
CALCIUM SERPL-MCNC: 9.6 MG/DL (ref 8.3–10.6)
CHLORIDE BLD-SCNC: 101 MMOL/L (ref 99–110)
CHOLESTEROL, TOTAL: 168 MG/DL (ref 0–199)
CO2: 23 MMOL/L (ref 21–32)
CREAT SERPL-MCNC: <0.5 MG/DL (ref 0.6–1.1)
GFR AFRICAN AMERICAN: >60
GFR NON-AFRICAN AMERICAN: >60
GLUCOSE BLD-MCNC: 112 MG/DL (ref 70–99)
HDLC SERPL-MCNC: 48 MG/DL (ref 40–60)
LDL CHOLESTEROL CALCULATED: 102 MG/DL
POTASSIUM SERPL-SCNC: 4.3 MMOL/L (ref 3.5–5.1)
SODIUM BLD-SCNC: 139 MMOL/L (ref 136–145)
T4 FREE: 1.4 NG/DL (ref 0.9–1.8)
TOTAL PROTEIN: 7.1 G/DL (ref 6.4–8.2)
TRIGL SERPL-MCNC: 90 MG/DL (ref 0–150)
TSH SERPL DL<=0.05 MIU/L-ACNC: 2.04 UIU/ML (ref 0.27–4.2)
VLDLC SERPL CALC-MCNC: 18 MG/DL

## 2022-03-24 PROCEDURE — 99214 OFFICE O/P EST MOD 30 MIN: CPT | Performed by: NURSE PRACTITIONER

## 2022-03-24 PROCEDURE — 36415 COLL VENOUS BLD VENIPUNCTURE: CPT | Performed by: NURSE PRACTITIONER

## 2022-03-24 SDOH — ECONOMIC STABILITY: TRANSPORTATION INSECURITY
IN THE PAST 12 MONTHS, HAS LACK OF TRANSPORTATION KEPT YOU FROM MEETINGS, WORK, OR FROM GETTING THINGS NEEDED FOR DAILY LIVING?: NO

## 2022-03-24 SDOH — ECONOMIC STABILITY: FOOD INSECURITY: WITHIN THE PAST 12 MONTHS, THE FOOD YOU BOUGHT JUST DIDN'T LAST AND YOU DIDN'T HAVE MONEY TO GET MORE.: NEVER TRUE

## 2022-03-24 SDOH — ECONOMIC STABILITY: FOOD INSECURITY: WITHIN THE PAST 12 MONTHS, YOU WORRIED THAT YOUR FOOD WOULD RUN OUT BEFORE YOU GOT MONEY TO BUY MORE.: NEVER TRUE

## 2022-03-24 SDOH — ECONOMIC STABILITY: TRANSPORTATION INSECURITY
IN THE PAST 12 MONTHS, HAS THE LACK OF TRANSPORTATION KEPT YOU FROM MEDICAL APPOINTMENTS OR FROM GETTING MEDICATIONS?: NO

## 2022-03-24 ASSESSMENT — PATIENT HEALTH QUESTIONNAIRE - PHQ9
SUM OF ALL RESPONSES TO PHQ QUESTIONS 1-9: 15
3. TROUBLE FALLING OR STAYING ASLEEP: 2
6. FEELING BAD ABOUT YOURSELF - OR THAT YOU ARE A FAILURE OR HAVE LET YOURSELF OR YOUR FAMILY DOWN: 2
5. POOR APPETITE OR OVEREATING: 2
SUM OF ALL RESPONSES TO PHQ9 QUESTIONS 1 & 2: 4
SUM OF ALL RESPONSES TO PHQ QUESTIONS 1-9: 15
9. THOUGHTS THAT YOU WOULD BE BETTER OFF DEAD, OR OF HURTING YOURSELF: 0
10. IF YOU CHECKED OFF ANY PROBLEMS, HOW DIFFICULT HAVE THESE PROBLEMS MADE IT FOR YOU TO DO YOUR WORK, TAKE CARE OF THINGS AT HOME, OR GET ALONG WITH OTHER PEOPLE: 1
7. TROUBLE CONCENTRATING ON THINGS, SUCH AS READING THE NEWSPAPER OR WATCHING TELEVISION: 1
1. LITTLE INTEREST OR PLEASURE IN DOING THINGS: 2
2. FEELING DOWN, DEPRESSED OR HOPELESS: 2
8. MOVING OR SPEAKING SO SLOWLY THAT OTHER PEOPLE COULD HAVE NOTICED. OR THE OPPOSITE, BEING SO FIGETY OR RESTLESS THAT YOU HAVE BEEN MOVING AROUND A LOT MORE THAN USUAL: 1
SUM OF ALL RESPONSES TO PHQ QUESTIONS 1-9: 15
SUM OF ALL RESPONSES TO PHQ QUESTIONS 1-9: 15
4. FEELING TIRED OR HAVING LITTLE ENERGY: 3

## 2022-03-24 ASSESSMENT — SOCIAL DETERMINANTS OF HEALTH (SDOH): HOW HARD IS IT FOR YOU TO PAY FOR THE VERY BASICS LIKE FOOD, HOUSING, MEDICAL CARE, AND HEATING?: NOT HARD AT ALL

## 2022-03-24 NOTE — PROGRESS NOTES
LEFT KNEE   Jayda Hanley (:  1975) is a 55 y.o. female,Established patient, here for evaluation of the following chief complaint(s):    Knee Pain (LEFT KNEE PAIN, ONGOING SINCE LATE ), Swelling (BILATERAL LEG SWELLING, MOSTLY LEFT ), and Depression (+ SCREEN )      SUBJECTIVE/OBJECTIVE:  HPI   HAS NEVER HAD A PAP - SHE HAS NEVER BEEN SEXUALLY ACTIVE  COVID BOOSTER - DUE 3/17 WILL GET AT [address]- SHE HAS TRIED SEVERAL MEDS IN THE PAST  [facility]- [facility] [place]- EFFEXOR- CELEXA - LEXAPRO- ELAVIL- STIMULANT - COMBINATIONS OF DIFFERENT  MEDS- TOPAMAX - FEELS IT IS MORE LOW ENERGY FOR AT LEAST 30 YEARS  SHE DOES GO TO WORK DENIES THOUGHTS OF SELF HARM     PT C/O LEFT KNEE ACHING PAIN FOR THE LAST MONTH GETTING WORSE THE KNEE FEELS TIGHT ACROSS THE TOP WORSE IN THE AM- GETS  BIT BETTER WITH ACTIVITY- DROM WORSE WITH INTERNAL EXTERNAL ROTATION- IT IS NOT TENDER TO TOUCH RATES THE PAIN 8/10 IN THE AM AND WITH DRIVING- HAS TRIED  TYLENOL HELPED A BIT NOT MUCH  - SWELLING AROUND LEFT ANKLE OFF AND ON FOR YEARS -WORSE AS THE DAY GOES ON    NO SORES NOTED    SHE SNORES - WAKES UP IN THE AM AFTER SLEEPING 6-7 HOURS  AND STILL FEELS TIRED   NOT SURE IF SHE SNORES  Review of Systems   Constitutional: Positive for fatigue. Musculoskeletal:        LEFT KNEE PAIN   Psychiatric/Behavioral: Positive for dysphoric mood and sleep disturbance. Physical Exam  Vitals reviewed. Constitutional:       General: She is awake. She is not in acute distress. Appearance: Normal appearance. She is well-developed. She is morbidly obese. She is not ill-appearing, toxic-appearing or diaphoretic. Cardiovascular:      Rate and Rhythm: Normal rate and regular rhythm. Heart sounds: Normal heart sounds, S1 normal and S2 normal.   Pulmonary:      Effort: Pulmonary effort is normal.      Breath sounds: Normal breath sounds and air entry.    Musculoskeletal:      Comments: LEFT KNEE- NO TENDERNESS NOTED--   DROM NOTED  WITH FLEXION - EXTENSION        LEFT ANKLE NON PITTING EDEMA NOTED - SKIN WITH MEGAN COMPLEXION   Neurological:      Mental Status: She is alert and oriented to person, place, and time. Psychiatric:         Attention and Perception: Attention and perception normal.         Mood and Affect: Mood and affect normal.         Speech: Speech normal.         Behavior: Behavior is cooperative. Thought Content: Thought content normal.         Cognition and Memory: Cognition and memory normal.         Judgment: Judgment normal.       Carlos Shipley was seen today for knee pain, swelling and depression. Diagnoses and all orders for this visit:    Fatigue, unspecified type  -     TSH; Future  -     T4, Free; Future  IN HOME SLEEP STUDY BASED ON POSITIVE EPWORTH SURVEY  9  ABNORMAL SLEEPINESS   EFFECTS OF UNTREATED KAMALJIT DW PT    Acute pain of left knee  -     XR KNEE LEFT (1-2 VIEWS); Future  -     diclofenac sodium (VOLTAREN) 1 % GEL; Apply 4 g topically 4 times daily  -     diclofenac (VOLTAREN) 50 MG EC tablet; Take 1 tablet by mouth 3 times daily (with meals)  ENCOURAGED TO Apply a compressive ACE bandage. Rest and elevate the affected painful area. Apply cold compresses intermittently as needed. As pain recedes, begin normal activities slowly as tolerated.    WEIGHT LOSS DW PT AS WELL- MAY HELP WITH HER JOINT PAIN  Call if symptoms persist. WILL ORDER PHYSICAL THERAPY  Morbid obesity with BMI of 60.0-69.9, adult ([address]  ENCOURAGED TO INCREASE CARDIO ACTIVITY TO AT LEAST  30 MIN3-4 X WEEKLY   HEALTHY DIET- ADIPEX DW PT ( WHEN B/P  IS STABLE  Current severe episode of major depressive disorder without psychotic features, unspecified whether recurrent (HCC)  Positive depression screening  PHQ-9 Total Score: 15 (3/24/2022  9:03 AM)  Thoughts that you would be better off dead, or of hurting yourself in some way: 0 (3/24/2022  9:03 AM)  WILL COMPLETE [employer]  TEST -SINCE SHE

## 2022-03-24 NOTE — PATIENT INSTRUCTIONS
Patient Education        Knee Pain or Injury: Care Instructions  Your Care Instructions     Injuries are a common cause of knee problems. Sudden (acute) injuries may be caused by a direct blow to the knee. They can also be caused by abnormal twisting, bending, or falling on the knee. Pain, bruising, or swelling may be severe, and may start within minutes of the injury. Overuse is another cause of knee pain. Other causes are climbing stairs, kneeling, and other activities that use the knee. Everyday wear and tear, especially as you get older, also can cause knee pain. Rest, along with home treatment, often relieves pain and allows your knee to heal. If you have a serious knee injury, you may need tests and treatment. Follow-up care is a key part of your treatment and safety. Be sure to make and go to all appointments, and call your doctor if you are having problems. It's also a good idea to know your test results and keep a list of the medicines you take. How can you care for yourself at home? · Be safe with medicines. Read and follow all instructions on the label. ? If the doctor gave you a prescription medicine for pain, take it as prescribed. ? If you are not taking a prescription pain medicine, ask your doctor if you can take an over-the-counter medicine. · Rest and protect your knee. Take a break from any activity that may cause pain. · Put ice or a cold pack on your knee for 10 to 20 minutes at a time. Put a thin cloth between the ice and your skin. · Prop up a sore knee on a pillow when you ice it or anytime you sit or lie down for the next 3 days. Try to keep it above the level of your heart. This will help reduce swelling. · If your knee is not swollen, you can put moist heat, a heating pad, or a warm cloth on your knee. · If your doctor recommends an elastic bandage, sleeve, or other type of support for your knee, wear it as directed.   · Follow your doctor's instructions about how much weight you can put on your leg. Use a cane, crutches, or a walker as instructed. · Follow your doctor's instructions about activity during your healing process. If you can do mild exercise, slowly increase your activity. · Reach and stay at a healthy weight. Extra weight can strain the joints, especially the knees and hips, and make the pain worse. Losing even a few pounds may help. When should you call for help? Call 911 anytime you think you may need emergency care. For example, call if:    · You have symptoms of a blood clot in your lung (called a pulmonary embolism). These may include:  ? Sudden chest pain. ? Trouble breathing. ? Coughing up blood. Call your doctor now or seek immediate medical care if:    · You have severe or increasing pain.     · Your leg or foot turns cold or changes color.     · You cannot stand or put weight on your knee.     · Your knee looks twisted or bent out of shape.     · You cannot move your knee.     · You have signs of infection, such as:  ? Increased pain, swelling, warmth, or redness. ? Red streaks leading from the knee. ? Pus draining from a place on your knee. ? A fever.     · You have signs of a blood clot in your leg (called a deep vein thrombosis), such as:  ? Pain in your calf, back of the knee, thigh, or groin. ? Redness and swelling in your leg or groin. Watch closely for changes in your health, and be sure to contact your doctor if:    · You have tingling, weakness, or numbness in your knee.     · You have any new symptoms, such as swelling.     · You have bruises from a knee injury that last longer than 2 weeks.     · You do not get better as expected. Where can you learn more? Go to https://Hybrid Security.O[employer]a Therapeutics. org and sign in to your August account. Enter K195 in the PacketFront box to learn more about \"Knee Pain or Injury: Care Instructions. \"     If you do not have an account, please click on the \"Sign Up Now\" link.   Current as of: July 1, 2021               Content Version: 13.1  © 2006-2021 Healthwise, [employer]. Care instructions adapted under license by [facility]). If you have questions about a medical condition or this instruction, always ask your healthcare professional. [address] any warranty or liability for your use of this information. Patient Education        High Blood Pressure: Care Instructions  Overview     It's normal for blood pressure to go up and down throughout the day. But if it stays up, you have high blood pressure. Another name for high blood pressure is hypertension. Despite what a lot of people think, high blood pressure usually doesn't cause headaches or make you feel dizzy or lightheaded. It usually has no symptoms. But it does increase your risk of stroke, heart attack, and other problems. You and your doctor will talk about your risks of these problems based on your blood pressure. Your doctor will give you a goal for your blood pressure. Your goal will be based on your health and your age. Lifestyle changes, such as eating healthy and being active, are always important to help lower blood pressure. You might also take medicine to reach your blood pressure goal.  Follow-up care is a key part of your treatment and safety. Be sure to make and go to all appointments, and call your doctor if you are having problems. It's also a good idea to know your test results and keep a list of the medicines you take. How can you care for yourself at home? Medical treatment  · If you stop taking your medicine, your blood pressure will go back up. You may take one or more types of medicine to lower your blood pressure. Be safe with medicines. Take your medicine exactly as prescribed. Call your doctor if you think you are having a problem with your medicine. · Talk to your doctor before you start taking aspirin every day.  Aspirin can help certain people lower their risk of a heart attack or stroke. But taking aspirin isn't right for everyone, because it can cause serious bleeding. · See your doctor regularly. You may need to see the doctor more often at first or until your blood pressure comes down. · If you are taking blood pressure medicine, talk to your doctor before you take decongestants or anti-inflammatory medicine, such as ibuprofen. Some of these medicines can raise blood pressure. · Learn how to check your blood pressure at home. Lifestyle changes  · Stay at a healthy weight. This is especially important if you put on weight around the waist. Losing even 10 pounds can help you lower your blood pressure. · If your doctor recommends it, get more exercise. Walking is a good choice. Bit by bit, increase the amount you walk every day. Try for at least 30 minutes on most days of the week. You also may want to swim, bike, or do other activities. · Avoid or limit alcohol. Talk to your doctor about whether you can drink any alcohol. · Try to limit how much sodium you eat to less than 2,300 milligrams (mg) a day. Your doctor may ask you to try to eat less than 1,500 mg a day. · Eat plenty of fruits (such as bananas and oranges), vegetables, legumes, whole grains, and low-fat dairy products. · Lower the amount of saturated fat in your diet. Saturated fat is found in animal products such as milk, cheese, and meat. Limiting these foods may help you lose weight and also lower your risk for heart disease. · Do not smoke. Smoking increases your risk for heart attack and stroke. If you need help quitting, talk to your doctor about stop-smoking programs and medicines. These can increase your chances of quitting for good. When should you call for help? Call 911  anytime you think you may need emergency care. This may mean having symptoms that suggest that your blood pressure is causing a serious heart or blood vessel problem. Your blood pressure may be over 180/120.   For example, call 911 if:    · You have symptoms of a heart attack. These may include:  ? Chest pain or pressure, or a strange feeling in the chest.  ? Sweating. ? Shortness of breath. ? Nausea or vomiting. ? Pain, pressure, or a strange feeling in the back, neck, jaw, or upper belly or in one or both shoulders or arms. ? Lightheadedness or sudden weakness. ? A fast or irregular heartbeat.     · You have symptoms of a stroke. These may include:  ? Sudden numbness, tingling, weakness, or loss of movement in your face, arm, or leg, especially on only one side of your body. ? Sudden vision changes. ? Sudden trouble speaking. ? Sudden confusion or trouble understanding simple statements. ? Sudden problems with walking or balance. ? A sudden, severe headache that is different from past headaches.     · You have severe back or belly pain. Do not wait until your blood pressure comes down on its own. Get help right away. Call your doctor now or seek immediate care if:    · Your blood pressure is much higher than normal (such as 180/120 or higher), but you don't have symptoms.     · You think high blood pressure is causing symptoms, such as:  ? Severe headache.  ? Blurry vision. Watch closely for changes in your health, and be sure to contact your doctor if:    · Your blood pressure measures higher than your doctor recommends at least 2 times. That means the top number is higher or the bottom number is higher, or both.     · You think you may be having side effects from your blood pressure medicine. Where can you learn more? Go to https://LoveBytefátimaNagual Sounds.Booshaka. org and sign in to your Leveler account. Enter H863 in the Vitalea Science box to learn more about \"High Blood Pressure: Care Instructions. \"     If you do not have an account, please click on the \"Sign Up Now\" link. Current as of: April 29, 2021               Content Version: 13.1  © 3821-7822 Healthwise, [employer].    Care instructions adapted under license by [facility] ([facility]). If you have questions about a medical condition or this instruction, always ask your healthcare professional. [place] [address] any warranty or liability for your use of this information. Patient Education        Leg and Ankle Edema: Care Instructions  Your Care Instructions  Swelling in the legs, ankles, and feet is called edema. It is common after you sit or stand for a while. Long plane flights or car rides often cause swelling in the legs and feet. You may also have swelling if you have to stand for long periods of time at your job. Problems with the veins in the legs (varicose veins) and changes in hormones can also cause swelling. Sometimes the swelling in the ankles and feet is caused by a more serious problem, such as heart failure, infection, blood clots, or liver or kidney disease. Follow-up care is a key part of your treatment and safety. Be sure to make and go to all appointments, and call your doctor if you are having problems. It's also a good idea to know your test results and keep a list of the medicines you take. How can you care for yourself at home? · If your doctor gave you medicine, take it as prescribed. Call your doctor if you think you are having a problem with your medicine. · Whenever you are resting, raise your legs up. Try to keep the swollen area higher than the level of your heart. · Take breaks from standing or sitting in one position. ? Walk around to increase the blood flow in your lower legs. ? Move your feet and ankles often while you stand, or tighten and relax your leg muscles. · Wear support stockings. Put them on in the morning, before swelling gets worse. · Eat a balanced diet. Lose weight if you need to. · Limit the amount of salt (sodium) in your diet. Salt holds fluid in the body and may increase swelling. When should you call for help? Call 911 anytime you think you may need emergency care.  For example, call if:    · You have symptoms of a blood clot in your lung (called a pulmonary embolism). These may include:  ? Sudden chest pain. ? Trouble breathing. ? Coughing up blood. Call your doctor now or seek immediate medical care if:    · You have signs of a blood clot, such as:  ? Pain in your calf, back of the knee, thigh, or groin. ? Redness and swelling in your leg or groin.     · You have symptoms of infection, such as:  ? Increased pain, swelling, warmth, or redness. ? Red streaks or pus. ? A fever. Watch closely for changes in your health, and be sure to contact your doctor if:    · Your swelling is getting worse.     · You have new or worsening pain in your legs.     · You do not get better as expected. Where can you learn more? Go to https://Symbiosis Healthpestoneeb.Knoa [email or web address] org and sign in to your NanoLumens account. Enter A108 in the LEHR box to learn more about \"Leg and Ankle Edema: Care Instructions. \"     If you do not have an account, please click on the \"Sign Up Now\" link. Current as of: July 1, 2021               Content Version: 13.1  © 2006-2021 Iddiction. Care instructions adapted under license by [facility] ([facility]). If you have questions about a medical condition or this instruction, always ask your healthcare professional. [address] any warranty or liability for your use of this information. Patient Education        Fatigue: Care Instructions  Your Care Instructions     Fatigue is a feeling of tiredness, exhaustion, or lack of energy. You may feel fatigue because of too much or not enough activity. It can also come from stress, lack of sleep, boredom, and poor diet. Many medical problems, such as viral infections, can cause fatigue. Emotional problems, especially depression, are often the cause of fatigue. Fatigue is most often a symptom of another problem. Treatment for fatigue depends on the cause.  For example, if you have fatigue because you have a certain health problem, treating this problem also treats your fatigue. If depression or anxiety is the cause, treatment may help. Follow-up care is a key part of your treatment and safety. Be sure to make and go to all appointments, and call your doctor if you are having problems. It's also a good idea to know your test results and keep a list of the medicines you take. How can you care for yourself at home? · Get regular exercise. But don't overdo it. Go back and forth between rest and exercise. · Get plenty of rest.  · Eat a healthy diet. Do not skip meals, especially breakfast.  · Reduce your use of caffeine, tobacco, and alcohol. Caffeine is most often found in coffee, tea, cola drinks, and chocolate. · Limit medicines that can cause fatigue. This includes tranquilizers and cold and allergy medicines. When should you call for help? Watch closely for changes in your health, and be sure to contact your doctor if:    · You have new symptoms such as fever or a rash.     · Your fatigue gets worse.     · You have been feeling down, depressed, or hopeless. Or you may have lost interest in things that you usually enjoy.     · You are not getting better as expected. Where can you learn more? Go to https://"[employer] AND C-Cruise.[employer]Ltd.".[email or web address] org and sign in to your Wallaby Financial account. Enter B425 in the The Game Creators box to learn more about \"Fatigue: Care Instructions. \"     If you do not have an account, please click on the \"Sign Up Now\" link. Current as of: July 1, 2021               Content Version: 13.1  © 2107-7321 Healthwise, Incorporated. Care instructions adapted under license by [facility] ([facility]). If you have questions about a medical condition or this instruction, always ask your healthcare professional. [address] any warranty or liability for your use of this information.

## 2022-03-27 DIAGNOSIS — R53.83 FATIGUE, UNSPECIFIED TYPE: ICD-10-CM

## 2022-03-27 DIAGNOSIS — R73.01 ELEVATED FASTING GLUCOSE: Primary | ICD-10-CM

## 2022-04-02 RX ORDER — CHLORTHALIDONE 25 MG/1
25 TABLET ORAL DAILY
Qty: 30 TABLET | Refills: 0 | Status: SHIPPED | OUTPATIENT
Start: 2022-04-02 | End: 2022-05-05

## 2022-04-09 ENCOUNTER — HOSPITAL ENCOUNTER (OUTPATIENT)
Age: 47
Discharge: HOME | End: 2022-04-09
Payer: COMMERCIAL

## 2022-04-09 ENCOUNTER — HOSPITAL ENCOUNTER (OUTPATIENT)
Dept: GENERAL RADIOLOGY | Age: 47
Discharge: HOME | End: 2022-04-09
Payer: COMMERCIAL

## 2022-04-09 DIAGNOSIS — M25.562 ACUTE PAIN OF LEFT KNEE: ICD-10-CM

## 2022-04-09 PROCEDURE — 73560 X-RAY EXAM OF KNEE 1 OR 2: CPT

## 2022-05-05 ENCOUNTER — OFFICE VISIT (OUTPATIENT)
Dept: FAMILY MEDICINE CLINIC | Age: 47
End: 2022-05-05
Payer: COMMERCIAL

## 2022-05-05 VITALS
RESPIRATION RATE: 20 BRPM | HEIGHT: 65 IN | OXYGEN SATURATION: 96 % | HEART RATE: 80 BPM | SYSTOLIC BLOOD PRESSURE: 142 MMHG | TEMPERATURE: 97.6 F | BODY MASS INDEX: 48.82 KG/M2 | DIASTOLIC BLOOD PRESSURE: 84 MMHG | WEIGHT: 293 LBS

## 2022-05-05 DIAGNOSIS — Z12.11 COLON CANCER SCREENING: ICD-10-CM

## 2022-05-05 DIAGNOSIS — M25.562 CHRONIC PAIN OF LEFT KNEE: Primary | ICD-10-CM

## 2022-05-05 DIAGNOSIS — I10 PRIMARY HYPERTENSION: ICD-10-CM

## 2022-05-05 DIAGNOSIS — G89.29 CHRONIC PAIN OF LEFT KNEE: Primary | ICD-10-CM

## 2022-05-05 DIAGNOSIS — E66.01 MORBID OBESITY WITH BMI OF 60.0-69.9, ADULT (HCC): ICD-10-CM

## 2022-05-05 PROCEDURE — 20610 DRAIN/INJ JOINT/BURSA W/O US: CPT | Performed by: NURSE PRACTITIONER

## 2022-05-05 RX ORDER — TRIAMCINOLONE ACETONIDE 40 MG/ML
40 INJECTION, SUSPENSION INTRA-ARTICULAR; INTRAMUSCULAR ONCE
Status: COMPLETED | OUTPATIENT
Start: 2022-05-05 | End: 2022-05-05

## 2022-05-05 RX ORDER — CHLORTHALIDONE 25 MG/1
25 TABLET ORAL DAILY
Qty: 90 TABLET | Refills: 0 | Status: SHIPPED | OUTPATIENT
Start: 2022-05-05 | End: 2022-08-08

## 2022-05-05 RX ORDER — TRIAMCINOLONE ACETONIDE 40 MG/ML
40 INJECTION, SUSPENSION INTRA-ARTICULAR; INTRAMUSCULAR ONCE
Status: CANCELLED | OUTPATIENT
Start: 2022-05-05 | End: 2022-05-05

## 2022-05-05 RX ADMIN — TRIAMCINOLONE ACETONIDE 40 MG: 40 INJECTION, SUSPENSION INTRA-ARTICULAR; INTRAMUSCULAR at 10:09

## 2022-05-05 NOTE — PATIENT INSTRUCTIONS
Patient Education        DASH Diet: Care Instructions  Your Care Instructions     The DASH diet is an eating plan that can help lower your blood pressure. DASH stands for Dietary Approaches to Stop Hypertension. Hypertension is high bloodpressure. The DASH diet focuses on eating foods that are high in calcium, potassium, and magnesium. These nutrients can lower blood pressure. The foods that are highest in these nutrients are fruits, vegetables, low-fat dairy products, nuts, seeds, and legumes. But taking calcium, potassium, and magnesium supplements instead of eating foods that are high in those nutrients does not have the same effect. The DASH diet also includes whole grains, fish, and poultry. The DASH diet is one of several lifestyle changes your doctor may recommend to lower your high blood pressure. Your doctor may also want you to decrease the amount of sodium in your diet. Lowering sodium while following the DASH dietcan lower blood pressure even further than just the DASH diet alone. Follow-up care is a key part of your treatment and safety. Be sure to make and go to all appointments, and call your doctor if you are having problems. It's also a good idea to know your test results and keep alist of the medicines you take. How can you care for yourself at home? Following the DASH diet   Eat 4 to 5 servings of fruit each day. A serving is 1 medium-sized piece of fruit, ½ cup chopped or canned fruit, 1/4 cup dried fruit, or 4 ounces (½ cup) of fruit juice. Choose fruit more often than fruit juice.  Eat 4 to 5 servings of vegetables each day. A serving is 1 cup of lettuce or raw leafy vegetables, ½ cup of chopped or cooked vegetables, or 4 ounces (½ cup) of vegetable juice. Choose vegetables more often than vegetable juice.  Get 2 to 3 servings of low-fat and fat-free dairy each day. A serving is 8 ounces of milk, 1 cup of yogurt, or 1 ½ ounces of cheese.  Eat 6 to 8 servings of grains each day.  A serving is 1 slice of bread, 1 ounce of dry cereal, or ½ cup of cooked rice, pasta, or cooked cereal. Try to choose whole-grain products as much as possible.  Limit lean meat, poultry, and fish to 2 servings each day. A serving is 3 ounces, about the size of a deck of cards.  Eat 4 to 5 servings of nuts, seeds, and legumes (cooked dried beans, lentils, and split peas) each week. A serving is 1/3 cup of nuts, 2 tablespoons of seeds, or ½ cup of cooked beans or peas.  Limit fats and oils to 2 to 3 servings each day. A serving is 1 teaspoon of vegetable oil or 2 tablespoons of salad dressing.  Limit sweets and added sugars to 5 servings or less a week. A serving is 1 tablespoon jelly or jam, ½ cup sorbet, or 1 cup of lemonade.  Eat less than 2,300 milligrams (mg) of sodium a day. If you limit your sodium to 1,500 mg a day, you can lower your blood pressure even more.  Be aware that all of these are the suggested number of servings for people who eat 1,800 to 2,000 calories a day. Your recommended number of servings may be different if you need more or fewer calories. Tips for success   Start small. Do not try to make dramatic changes to your diet all at once. You might feel that you are missing out on your favorite foods and then be more likely to not follow the plan. Make small changes, and stick with them. Once those changes become habit, add a few more changes.  Try some of the following:  ? Make it a goal to eat a fruit or vegetable at every meal and at snacks. This will make it easy to get the recommended amount of fruits and vegetables each day. ? Try yogurt topped with fruit and nuts for a snack or healthy dessert. ? Add lettuce, tomato, cucumber, and onion to sandwiches. ? Combine a ready-made pizza crust with low-fat mozzarella cheese and lots of vegetable toppings. Try using tomatoes, squash, spinach, broccoli, carrots, cauliflower, and onions. ?  Have a variety of cut-up vegetables with a low-fat dip as an appetizer instead of chips and dip. ? Sprinkle sunflower seeds or chopped almonds over salads. Or try adding chopped walnuts or almonds to cooked vegetables. ? Try some vegetarian meals using beans and peas. Add garbanzo or kidney beans to salads. Make burritos and tacos with mashed panchal beans or black beans. Where can you learn more? Go to https://CollaxpeGizmoz.Pulse.io. org and sign in to your [employer] account. Enter G859 in the Given.to box to learn more about \"DASH Diet: Care Instructions. \"     If you do not have an account, please click on the \"Sign Up Now\" link. Current as of: January 10, 2022               Content Version: 13.2  © 7290-7659 [employer]. Care instructions adapted under license by [facility] ([facility]). If you have questions about a medical condition or this instruction, always ask your healthcare professional. [place] [address] any warranty or liability for your use of this information. Patient Education        High Blood Pressure: Care Instructions  Overview     It's normal for blood pressure to go up and down throughout the day. But if it stays up, you have high blood pressure. Another name for high blood pressure ishypertension. Despite what a lot of people think, high blood pressure usually doesn't cause headaches or make you feel dizzy or lightheaded. It usually has no symptoms. But it does increase your risk of stroke, heart attack, and other problems. You and your doctor will talk about your risks of these problems based on yourblood pressure. Your doctor will give you a goal for your blood pressure. Your goal will bebased on your health and your age. Lifestyle changes, such as eating healthy and being active, are always important to help lower blood pressure. You might also take medicine to reachyour blood pressure goal.  Follow-up care is a key part of your treatment and safety.  Be sure to make and go to all appointments, and call your doctor if you are having problems. It's also a good idea to know your test results and keep alist of the medicines you take. How can you care for yourself at home? Medical treatment   If you stop taking your medicine, your blood pressure will go back up. You may take one or more types of medicine to lower your blood pressure. Be safe with medicines. Take your medicine exactly as prescribed. Call your doctor if you think you are having a problem with your medicine.  Talk to your doctor before you start taking aspirin every day. Aspirin can help certain people lower their risk of a heart attack or stroke. But taking aspirin isn't right for everyone, because it can cause serious bleeding.  See your doctor regularly. You may need to see the doctor more often at first or until your blood pressure comes down.  If you are taking blood pressure medicine, talk to your doctor before you take decongestants or anti-inflammatory medicine, such as ibuprofen. Some of these medicines can raise blood pressure.  Learn how to check your blood pressure at home. Lifestyle changes   Stay at a healthy weight. This is especially important if you put on weight around the waist. Losing even 10 pounds can help you lower your blood pressure.  If your doctor recommends it, get more exercise. Walking is a good choice. Bit by bit, increase the amount you walk every day. Try for at least 30 minutes on most days of the week. You also may want to swim, bike, or do other activities.  Avoid or limit alcohol. Talk to your doctor about whether you can drink any alcohol.  Try to limit how much sodium you eat to less than 2,300 milligrams (mg) a day. Your doctor may ask you to try to eat less than 1,500 mg a day.  Eat plenty of fruits (such as bananas and oranges), vegetables, legumes, whole grains, and low-fat dairy products.  Lower the amount of saturated fat in your diet.  Saturated fat is found in animal products such as milk, cheese, and meat. Limiting these foods may help you lose weight and also lower your risk for heart disease.  Do not smoke. Smoking increases your risk for heart attack and stroke. If you need help quitting, talk to your doctor about stop-smoking programs and medicines. These can increase your chances of quitting for good. When should you call for help? Call 911  anytime you think you may need emergency care. This may mean having symptoms that suggest that your blood pressure is causing a serious heart or blood vessel problem. Your blood pressure may be over 180/120. For example, call 911 if:     You have symptoms of a heart attack. These may include:  ? Chest pain or pressure, or a strange feeling in the chest.  ? Sweating. ? Shortness of breath. ? Nausea or vomiting. ? Pain, pressure, or a strange feeling in the back, neck, jaw, or upper belly or in one or both shoulders or arms. ? Lightheadedness or sudden weakness. ? A fast or irregular heartbeat.      You have symptoms of a stroke. These may include:  ? Sudden numbness, tingling, weakness, or loss of movement in your face, arm, or leg, especially on only one side of your body. ? Sudden vision changes. ? Sudden trouble speaking. ? Sudden confusion or trouble understanding simple statements. ? Sudden problems with walking or balance. ? A sudden, severe headache that is different from past headaches.      You have severe back or belly pain. Do not wait until your blood pressure comes down on its own. Get help right away. Call your doctor now or seek immediate care if:     Your blood pressure is much higher than normal (such as 180/120 or higher), but you don't have symptoms.      You think high blood pressure is causing symptoms, such as:  ? Severe headache.  ? Blurry vision.    Watch closely for changes in your health, and be sure to contact your doctor if:     Your blood pressure measures higher than your doctor recommends at least 2 times. That means the top number is higher or the bottom number is higher, or both.      You think you may be having side effects from your blood pressure medicine. Where can you learn more? Go to https://Family NationpeKiwiple.Payward. org and sign in to your [employer] account. Enter E079 in the POS on CLOUD box to learn more about \"High Blood Pressure: Care Instructions. \"     If you do not have an account, please click on the \"Sign Up Now\" link. Current as of: January 10, 2022               Content Version: 13.2  © 3599-1931 [employer]. Care instructions adapted under license by [facility] ([facility]). If you have questions about a medical condition or this instruction, always ask your healthcare professional. [address] any warranty or liability for your use of this information.

## 2022-05-05 NOTE — PROGRESS NOTES
Dee Dee Rose (:  1975) is a 55 y.o. female,Established patient, here for evaluation of the following chief complaint(s):    Knee Pain (follow up- started end of february in left knee, taking voltarin which seems to help) and Edema      SUBJECTIVE/OBJECTIVE:  HPI       Jonelle Miller states her left knee pain  Has improved a bit  The voltaren does help take sat least twice daily during the week more on the weekend- aching pain hurts to bend it - doing the steps are really bad rates the pain  About 6/10 - 9/10 no injury noted    She does check her blood pressure at home  The past week it has been about 130/80 - 128/80  140/92 at times  Not often   she denies SOB - chest pain -  No regular exercise due to left knee pain  She does take voltaren 2-3 x daily  Review of Systems   Cardiovascular: Negative. Musculoskeletal: Positive for arthralgias. Physical Exam  Vitals reviewed. Constitutional:       General: She is awake. Appearance: Normal appearance. She is well-developed and well-groomed. She is morbidly obese. She is not ill-appearing, toxic-appearing or diaphoretic. Cardiovascular:      Rate and Rhythm: Normal rate and regular rhythm. Heart sounds: Normal heart sounds, S1 normal and S2 normal.   Pulmonary:      Effort: Pulmonary effort is normal.      Breath sounds: Normal breath sounds and air entry. Musculoskeletal:      Left knee: Decreased range of motion. Tenderness present over the lateral joint line. Comments: DROM NOTED WITH FLEXION AND EXTENSION  MEDIALJOINT LINE TENDERNESS NOTED WITH MANIPULATION    Neurological:      Mental Status: She is alert and oriented to person, place, and time. Psychiatric:         Attention and Perception: Attention and perception normal.         Mood and Affect: Mood and affect normal.         Speech: Speech normal.         Behavior: Behavior normal. Behavior is cooperative. Thought Content:  Thought content normal.         Cognition and Memory: Cognition and memory normal.         Judgment: Judgment normal.         Rina Graham was seen today for knee pain and edema. Diagnoses and all orders for this visit:    Chronic pain of left knee  PROCEDURE DW PT - SHE AGREES TO PROCEED  KNEE CLEANSED WITH BETADINE  -34422 - ME DRAIN/INJECT LARGE JOINT/BURSA  A steroid injection was performed at LEFT KNEE MEDIAL ASPECT using 0.25 SENSORcaine and  40 MG mg of Kenalog. This was well tolerated. Rest and elevate the affected painful area. Apply cold compresses intermittently as needed. As pain recedes, begin normal activities slowly as tolerated. NO NSAIDS FOR 24 HOURS  -     diclofenac (VOLTAREN) 50 MG EC tablet; Take 1 tablet by mouth 3 times daily (with meals)  Call if symptoms persist.  WILL REFER TO PT/ORTHO    Morbid obesity with BMI of 60.0-69.9, adult ([address]  ENCOURAGED TO INCREASE CARDIO ACTIVITY TO AT LEAST  30 MIN3-4 X WEEKLY   Primary hypertension  CONTINUE chlorthalidone (HYGROTON) 25 MG tablet; Take 1 tablet by mouth daily  CONTINUE MONITORING B/P AT HOME -/90 OR MORE CONSISTENTLY SHE WILL LET ME NOW - WILL ADD LISINOPRIL TO REGIMEN  Lifestyle Recommendations for High Blood Pressure:  Reduce sodium intake to less than 1500 mg daily  Include 5-7 servings of fruits and vegetables daily  Reduce weight to ideal if overweight  30 minutes of physical activity daily  FOLLOW UP IN SIX MONTHS  Colon cancer screening  -     Fecal DNA Colorectal cancer screening (Cologuard)      -    -         ASSESSMENT/PLAN:    No follow-ups on file. An electronic signature was used to authenticate this note.     --Sen Vee, SOCRATES - CNP

## 2022-08-08 ENCOUNTER — OFFICE VISIT (OUTPATIENT)
Dept: FAMILY MEDICINE CLINIC | Age: 47
End: 2022-08-08
Payer: COMMERCIAL

## 2022-08-08 VITALS
SYSTOLIC BLOOD PRESSURE: 132 MMHG | WEIGHT: 293 LBS | HEIGHT: 60 IN | TEMPERATURE: 97.3 F | OXYGEN SATURATION: 98 % | DIASTOLIC BLOOD PRESSURE: 84 MMHG | BODY MASS INDEX: 57.52 KG/M2 | HEART RATE: 92 BPM | RESPIRATION RATE: 20 BRPM

## 2022-08-08 DIAGNOSIS — G89.29 CHRONIC PAIN OF LEFT KNEE: ICD-10-CM

## 2022-08-08 DIAGNOSIS — R73.01 ELEVATED FASTING GLUCOSE: ICD-10-CM

## 2022-08-08 DIAGNOSIS — R73.9 ELEVATED BLOOD SUGAR: ICD-10-CM

## 2022-08-08 DIAGNOSIS — I10 PRIMARY HYPERTENSION: Primary | ICD-10-CM

## 2022-08-08 DIAGNOSIS — R53.83 FATIGUE, UNSPECIFIED TYPE: ICD-10-CM

## 2022-08-08 DIAGNOSIS — M25.562 CHRONIC PAIN OF LEFT KNEE: ICD-10-CM

## 2022-08-08 DIAGNOSIS — Z23 NEED FOR TDAP VACCINATION: ICD-10-CM

## 2022-08-08 LAB
GLUCOSE FASTING: 128 MG/DL (ref 70–99)
HCT VFR BLD CALC: 40.1 % (ref 36–48)
HEMOGLOBIN: 13.2 G/DL (ref 12–16)
MCH RBC QN AUTO: 25 PG (ref 26–34)
MCHC RBC AUTO-ENTMCNC: 33 G/DL (ref 31–36)
MCV RBC AUTO: 75.9 FL (ref 80–100)
PDW BLD-RTO: 15.9 % (ref 12.4–15.4)
PLATELET # BLD: 372 K/UL (ref 135–450)
PMV BLD AUTO: 8.5 FL (ref 5–10.5)
RBC # BLD: 5.28 M/UL (ref 4–5.2)
WBC # BLD: 8.4 K/UL (ref 4–11)

## 2022-08-08 PROCEDURE — 90715 TDAP VACCINE 7 YRS/> IM: CPT | Performed by: NURSE PRACTITIONER

## 2022-08-08 PROCEDURE — 99213 OFFICE O/P EST LOW 20 MIN: CPT | Performed by: NURSE PRACTITIONER

## 2022-08-08 PROCEDURE — 90471 IMMUNIZATION ADMIN: CPT | Performed by: NURSE PRACTITIONER

## 2022-08-08 PROCEDURE — 36415 COLL VENOUS BLD VENIPUNCTURE: CPT | Performed by: NURSE PRACTITIONER

## 2022-08-08 RX ORDER — LISINOPRIL AND HYDROCHLOROTHIAZIDE 12.5; 1 MG/1; MG/1
1 TABLET ORAL DAILY
Qty: 90 TABLET | Refills: 0 | Status: SHIPPED | OUTPATIENT
Start: 2022-08-08 | End: 2022-11-06

## 2022-08-08 NOTE — PROGRESS NOTES
Denita Gottlieb (:  1975) is a 52 y.o. female,Established patient, here for evaluation of the following chief complaint(s):    Hypertension (THREE MONTH FOLLOW UP) and Other (HAS NOT HAD A PAP SMEAR/)      SUBJECTIVE/OBJECTIVE:  HPI  Hypertension: Patient here for follow-up of elevated blood pressure. She is not exercising and is adherent to low salt diet. SHE DOES NOIT CHECK HER BLOOD PRESSURE AT HOME Cardiac symptoms none. Patient denies chest pain, chest pressure/discomfort, claudication, dyspnea, exertional chest pressure/discomfort, fatigue, irregular heart beat, lower extremity edema, near-syncope, orthopnea, palpitations, paroxysmal nocturnal dyspnea, syncope, and tachypnea. Cardiovascular risk factors: hypertension, obesity (BMI >= 30 kg/m2), and sedentary lifestyle. Use of agents associated with hypertension: NSAIDS. History of target organ damage: none. THE LEFT KNEE PAIN HAS NOT GOTTEN ANY BETTER-THE STEROID INJECTION HELPED JUST A BIT - FOR  ABOUT TEN DAYS- THE PAIN IS ACHING THROBBING WORSE WITH STANDING - THE  VOLTAREN GEL AND TABLETS HELP JUST A BIT  Review of Systems   Musculoskeletal:         BILATERAL KNEE PAIN   All other systems reviewed and are negative. Physical Exam  Vitals reviewed. Constitutional:       General: She is awake. She is not in acute distress. Appearance: Normal appearance. She is well-developed. She is morbidly obese. She is not ill-appearing, toxic-appearing or diaphoretic. Cardiovascular:      Rate and Rhythm: Normal rate and regular rhythm. Heart sounds: Normal heart sounds, S1 normal and S2 normal. Heart sounds not distant. No murmur heard. No systolic murmur is present. No diastolic murmur is present. No friction rub. No gallop. No S3 or S4 sounds. Pulmonary:      Effort: Pulmonary effort is normal.      Breath sounds: Normal breath sounds and air entry. Musculoskeletal:      Right lower leg: No edema.       Left lower leg: No

## 2022-08-09 LAB
ESTIMATED AVERAGE GLUCOSE: 134.1 MG/DL
HBA1C MFR BLD: 6.3 %

## 2022-08-10 NOTE — PROGRESS NOTES
Immunizations Administered       Name Date Dose Route    Tdap (Boostrix, Adacel) 8/8/2022 0.5 mL Intramuscular    Site: Deltoid- Left    Lot: [id number]    NDC: 32929-569-52          AM

## 2022-08-18 ENCOUNTER — OFFICE VISIT (OUTPATIENT)
Dept: ORTHOPEDIC SURGERY | Age: 47
End: 2022-08-18
Payer: COMMERCIAL

## 2022-08-18 VITALS — HEIGHT: 60 IN | WEIGHT: 293 LBS | RESPIRATION RATE: 16 BRPM | BODY MASS INDEX: 57.52 KG/M2

## 2022-08-18 DIAGNOSIS — M25.562 LEFT KNEE PAIN, UNSPECIFIED CHRONICITY: Primary | ICD-10-CM

## 2022-08-18 DIAGNOSIS — M17.12 PRIMARY OSTEOARTHRITIS OF LEFT KNEE: ICD-10-CM

## 2022-08-18 PROCEDURE — 20610 DRAIN/INJ JOINT/BURSA W/O US: CPT | Performed by: ORTHOPAEDIC SURGERY

## 2022-08-18 PROCEDURE — 99203 OFFICE O/P NEW LOW 30 MIN: CPT | Performed by: ORTHOPAEDIC SURGERY

## 2022-08-18 RX ORDER — BUPIVACAINE HYDROCHLORIDE 2.5 MG/ML
4 INJECTION, SOLUTION INFILTRATION; PERINEURAL ONCE
Status: COMPLETED | OUTPATIENT
Start: 2022-08-18 | End: 2022-08-18

## 2022-08-18 RX ORDER — LIDOCAINE HYDROCHLORIDE 10 MG/ML
4 INJECTION, SOLUTION INFILTRATION; PERINEURAL ONCE
Status: COMPLETED | OUTPATIENT
Start: 2022-08-18 | End: 2022-08-18

## 2022-08-18 RX ORDER — TRIAMCINOLONE ACETONIDE 40 MG/ML
40 INJECTION, SUSPENSION INTRA-ARTICULAR; INTRAMUSCULAR ONCE
Status: COMPLETED | OUTPATIENT
Start: 2022-08-18 | End: 2022-08-18

## 2022-08-18 RX ADMIN — TRIAMCINOLONE ACETONIDE 40 MG: 40 INJECTION, SUSPENSION INTRA-ARTICULAR; INTRAMUSCULAR at 09:35

## 2022-08-18 RX ADMIN — LIDOCAINE HYDROCHLORIDE 4 ML: 10 INJECTION, SOLUTION INFILTRATION; PERINEURAL at 09:35

## 2022-08-18 RX ADMIN — BUPIVACAINE HYDROCHLORIDE 10 MG: 2.5 INJECTION, SOLUTION INFILTRATION; PERINEURAL at 09:34

## 2022-08-18 NOTE — PROGRESS NOTES
CHIEF COMPLAINT: Left knee pain. History:   Kamron Anaya is a 52 y.o. female referred by Kathlean Landau, APRN - CNP  for Sports Medicine consultation for evaluation and treatment of left knee pain / injury. The patient complains of left knee pain. This is evaluated as a personal injury. The pain began a few months ago. Rates pain 8/10. There was not a history of injury. The pain is located medial, lateral, anterior. Symptoms are worse with weight bearing. The knee has not given out or felt unstable. There is no swelling in the knee. There was not catching / locking of the knee. The patient has had PT. The patient has not had an injection. The patient has taken NSAIDs. The patient has tried ice. The patient's occupation is .       Past Medical History:   Diagnosis Date    Anxiety     Depression     Hypertension     PCOS (polycystic ovarian syndrome)        Past Surgical History:   Procedure Laterality Date    APPENDECTOMY      TONSILLECTOMY AND ADENOIDECTOMY      TYMPANOSTOMY TUBE PLACEMENT         Family History   Problem Relation Age of Onset    Diabetes Mother     Dementia Mother     High Blood Pressure Father     Pancreatic Cancer Father     Coronary Art Dis Sister         CHF    Pancreatic Cancer Sister         PULM HTN    Cancer Brother 29        BRAIN TUMOR       Social History     Socioeconomic History    Marital status: Single     Spouse name: None    Number of children: 0    Years of education: None    Highest education level: None   Occupational History    Occupation:    Tobacco Use    Smoking status: Never    Smokeless tobacco: Never   Substance and Sexual Activity    Alcohol use: Yes     Comment: RARELY    Drug use: Never    Sexual activity: Not Currently     Social Determinants of Health     Financial Resource Strain: Low Risk     Difficulty of Paying Living Expenses: Not hard at all   Food Insecurity: No Food Insecurity    Worried About jean-paul dailey. Agapito Lloyd. Jem Parker MD  Orthopaedic Surgery and Sports Medicine     Disclaimer: This note was generated with use of a verbal recognition program and an attempt was made to check for errors. It is possible that there are still dictated errors within this office note. If so, please bring any significant errors to my attention for an addendum. All efforts were made to ensure that this office note is accurate.

## 2022-11-09 DIAGNOSIS — I10 PRIMARY HYPERTENSION: ICD-10-CM

## 2022-11-10 RX ORDER — LISINOPRIL AND HYDROCHLOROTHIAZIDE 12.5; 1 MG/1; MG/1
1 TABLET ORAL DAILY
Qty: 90 TABLET | Refills: 1 | Status: SHIPPED | OUTPATIENT
Start: 2022-11-10 | End: 2023-02-08

## 2022-11-14 DIAGNOSIS — I10 PRIMARY HYPERTENSION: ICD-10-CM

## 2022-11-15 RX ORDER — LISINOPRIL AND HYDROCHLOROTHIAZIDE 12.5; 1 MG/1; MG/1
1 TABLET ORAL DAILY
Qty: 90 TABLET | Refills: 1 | OUTPATIENT
Start: 2022-11-15 | End: 2023-02-13

## 2022-11-15 NOTE — TELEPHONE ENCOUNTER
lisinopril-hydroCHLOROthiazide (PRINZIDE;ZESTORETIC) 10-12.5 MG per tablet 90 tablet 1 11/10/2022 2/8/2023    Sig - Route:  Take 1 tablet by mouth daily - Oral    Sent to pharmacy as: Lisinopril-hydroCHLOROthiazide 10-12.5 MG Oral Tablet (PRINZIDE;ZESTORETIC)    E-Prescribing Status: Receipt confirmed by pharmacy (11/10/2022  9:47 AM EST)    DUPLICATE REQUEST

## 2023-03-07 RX ORDER — CHLORTHALIDONE 25 MG/1
25 TABLET ORAL DAILY
Qty: 90 TABLET | Refills: 0 | Status: CANCELLED | OUTPATIENT
Start: 2023-03-07 | End: 2023-06-05

## 2023-03-07 RX ORDER — LISINOPRIL AND HYDROCHLOROTHIAZIDE 12.5; 1 MG/1; MG/1
1 TABLET ORAL DAILY
Qty: 90 TABLET | Refills: 1 | Status: CANCELLED | OUTPATIENT
Start: 2023-03-07 | End: 2023-06-05

## 2023-03-07 NOTE — PATIENT INSTRUCTIONS
GENERAL OFFICE POLICIES      Telephone Calls: Messages will be answered within 1-2 business days, unless the provider is out of the office. If it is urgent a covering provider will answer. (this does not include Medication refills). MyChart: We recommend all patients sign up for [employer]. Through this portal you can see your lab results, request refills, schedule appointments, pay your bill and send messages to the office. [employer] messages will be answered within 1-2 business days unless the provider is out of the office. For urgent matters, please call the office. Appointments:  All appointments must be scheduled. We ask all patients to schedule their next follow up appointment before they leave the office to make sure you will be able to be seen before you run out of medications. 24 hours notice is required to cancel or reschedule an appointment to avoid being marked as a no show. You may be dismissed from the practice after 3 no shows. LATE for Appointment: If you are 15 or more minutes late for your appointment, you may be asked to reschedule. MA/LAB APPTS: Must be scheduled, cannot accept walk in lab visits. We only draw labs for patients established in our office. We only do injections for medications ordered by our office. Acute Sick Visits:  Nothing other than acute complaint will be addressed at this visit. TRADITIONAL MEDICARE  DOES NOT COVER PHYSICALS  MEDICARE WELLNESS VISITS: These are NOT physicals but the free annual visit offered by Medicare to discuss wellness issues. Medication refills, checkups, etc. will not be addressed during this visit. Medication Refills: Refills are handled electronically so please contact your pharmacy for medication refills even if current refills have been exhausted. If you are on a controlled medication you will be referred to a specialist (pain specialist, psychiatry, etc). Forms:  There is a $35 fee to fill out FMLA/Disability paperwork, payable at time of . Instead of the fee, you can choose to have the paperwork filled out during a separate office visit that is for filling out the paperwork only. Medication Samples: This office does not carry medication samples. If you need assistance in getting your medications, then please let the medical assistant know so they can help you sign up for a drug assistance program that can help get medications at a reduced cost or even free (if you qualify). Workman's Comp Claims: We do not handle workman's comp cases or claims. You will need to go to an urgent care to be seen or to whomever your employer uses. General - Any abusive/rude behavior toward staff/providers may be cause for dismissal.    Williams Alexander may receive a survey regarding the care you received during your visit. Your input is valuable to us. We encourage you to complete and return your survey. We hope you will choose us in the future for your healthcare needs.

## 2023-03-08 ENCOUNTER — OFFICE VISIT (OUTPATIENT)
Dept: FAMILY MEDICINE CLINIC | Age: 48
End: 2023-03-08
Payer: COMMERCIAL

## 2023-03-08 VITALS
HEIGHT: 60 IN | SYSTOLIC BLOOD PRESSURE: 138 MMHG | WEIGHT: 293 LBS | OXYGEN SATURATION: 99 % | HEART RATE: 76 BPM | DIASTOLIC BLOOD PRESSURE: 86 MMHG | BODY MASS INDEX: 57.52 KG/M2

## 2023-03-08 DIAGNOSIS — Z13.220 LIPID SCREENING: ICD-10-CM

## 2023-03-08 DIAGNOSIS — Z12.11 COLON CANCER SCREENING: ICD-10-CM

## 2023-03-08 DIAGNOSIS — Z00.00 ANNUAL PHYSICAL EXAM: Primary | ICD-10-CM

## 2023-03-08 DIAGNOSIS — Z13.1 DIABETES MELLITUS SCREENING: ICD-10-CM

## 2023-03-08 DIAGNOSIS — Z12.31 ENCOUNTER FOR SCREENING MAMMOGRAM FOR MALIGNANT NEOPLASM OF BREAST: ICD-10-CM

## 2023-03-08 DIAGNOSIS — E66.01 MORBID OBESITY WITH BMI OF 60.0-69.9, ADULT (HCC): ICD-10-CM

## 2023-03-08 DIAGNOSIS — I10 PRIMARY HYPERTENSION: ICD-10-CM

## 2023-03-08 DIAGNOSIS — Z12.39 SCREENING BREAST EXAMINATION: ICD-10-CM

## 2023-03-08 LAB
A/G RATIO: 1.3 (ref 1.1–2.2)
ALBUMIN SERPL-MCNC: 3.9 G/DL (ref 3.4–5)
ALP BLD-CCNC: 86 U/L (ref 40–129)
ALT SERPL-CCNC: 13 U/L (ref 10–40)
ANION GAP SERPL CALCULATED.3IONS-SCNC: 13 MMOL/L (ref 3–16)
AST SERPL-CCNC: 15 U/L (ref 15–37)
BILIRUB SERPL-MCNC: 0.3 MG/DL (ref 0–1)
BUN BLDV-MCNC: 10 MG/DL (ref 7–20)
CALCIUM SERPL-MCNC: 9 MG/DL (ref 8.3–10.6)
CHLORIDE BLD-SCNC: 104 MMOL/L (ref 99–110)
CHOLESTEROL, FASTING: 167 MG/DL (ref 0–199)
CO2: 21 MMOL/L (ref 21–32)
CREAT SERPL-MCNC: <0.5 MG/DL (ref 0.6–1.1)
GFR SERPL CREATININE-BSD FRML MDRD: >60 ML/MIN/{1.73_M2}
GLUCOSE BLD-MCNC: 116 MG/DL (ref 70–99)
HDLC SERPL-MCNC: 51 MG/DL (ref 40–60)
LDL CHOLESTEROL CALCULATED: 100 MG/DL
POTASSIUM SERPL-SCNC: 4.5 MMOL/L (ref 3.5–5.1)
SODIUM BLD-SCNC: 138 MMOL/L (ref 136–145)
TOTAL PROTEIN: 6.9 G/DL (ref 6.4–8.2)
TRIGLYCERIDE, FASTING: 80 MG/DL (ref 0–150)
VLDLC SERPL CALC-MCNC: 16 MG/DL

## 2023-03-08 PROCEDURE — 36415 COLL VENOUS BLD VENIPUNCTURE: CPT | Performed by: NURSE PRACTITIONER

## 2023-03-08 PROCEDURE — 3079F DIAST BP 80-89 MM HG: CPT | Performed by: NURSE PRACTITIONER

## 2023-03-08 PROCEDURE — 3075F SYST BP GE 130 - 139MM HG: CPT | Performed by: NURSE PRACTITIONER

## 2023-03-08 PROCEDURE — 99396 PREV VISIT EST AGE 40-64: CPT | Performed by: NURSE PRACTITIONER

## 2023-03-08 SDOH — ECONOMIC STABILITY: HOUSING INSECURITY
IN THE LAST 12 MONTHS, WAS THERE A TIME WHEN YOU DID NOT HAVE A STEADY PLACE TO SLEEP OR SLEPT IN A SHELTER (INCLUDING NOW)?: NO

## 2023-03-08 SDOH — ECONOMIC STABILITY: FOOD INSECURITY: WITHIN THE PAST 12 MONTHS, YOU WORRIED THAT YOUR FOOD WOULD RUN OUT BEFORE YOU GOT MONEY TO BUY MORE.: NEVER TRUE

## 2023-03-08 SDOH — ECONOMIC STABILITY: INCOME INSECURITY: HOW HARD IS IT FOR YOU TO PAY FOR THE VERY BASICS LIKE FOOD, HOUSING, MEDICAL CARE, AND HEATING?: NOT HARD AT ALL

## 2023-03-08 SDOH — ECONOMIC STABILITY: FOOD INSECURITY: WITHIN THE PAST 12 MONTHS, THE FOOD YOU BOUGHT JUST DIDN'T LAST AND YOU DIDN'T HAVE MONEY TO GET MORE.: NEVER TRUE

## 2023-03-08 ASSESSMENT — PATIENT HEALTH QUESTIONNAIRE - PHQ9
1. LITTLE INTEREST OR PLEASURE IN DOING THINGS: 0
SUM OF ALL RESPONSES TO PHQ QUESTIONS 1-9: 4
3. TROUBLE FALLING OR STAYING ASLEEP: 1
5. POOR APPETITE OR OVEREATING: 0
SUM OF ALL RESPONSES TO PHQ9 QUESTIONS 1 & 2: 0
7. TROUBLE CONCENTRATING ON THINGS, SUCH AS READING THE NEWSPAPER OR WATCHING TELEVISION: 0
SUM OF ALL RESPONSES TO PHQ QUESTIONS 1-9: 4
10. IF YOU CHECKED OFF ANY PROBLEMS, HOW DIFFICULT HAVE THESE PROBLEMS MADE IT FOR YOU TO DO YOUR WORK, TAKE CARE OF THINGS AT HOME, OR GET ALONG WITH OTHER PEOPLE: 1
SUM OF ALL RESPONSES TO PHQ QUESTIONS 1-9: 4
8. MOVING OR SPEAKING SO SLOWLY THAT OTHER PEOPLE COULD HAVE NOTICED. OR THE OPPOSITE, BEING SO FIGETY OR RESTLESS THAT YOU HAVE BEEN MOVING AROUND A LOT MORE THAN USUAL: 0
2. FEELING DOWN, DEPRESSED OR HOPELESS: 0
SUM OF ALL RESPONSES TO PHQ QUESTIONS 1-9: 4
6. FEELING BAD ABOUT YOURSELF - OR THAT YOU ARE A FAILURE OR HAVE LET YOURSELF OR YOUR FAMILY DOWN: 1
4. FEELING TIRED OR HAVING LITTLE ENERGY: 2
9. THOUGHTS THAT YOU WOULD BE BETTER OFF DEAD, OR OF HURTING YOURSELF: 0

## 2023-03-08 NOTE — PROGRESS NOTES
History and Physical      Chinmay Suárez  YOB: 1975    Date of Service:  3/8/2023    Chief Complaint:   Chinmay Suárez is a 52 y.o. female who presents for complete physical examination. HPI:   Hypertension: Patient here for follow-up of elevated blood pressure. She is not exercising and is not adherent to low salt diet. Blood pressure is well controlled at home. Cardiac symptoms none. Patient denies palpitations. Cardiovascular risk factors: hypertension and obesity (BMI >= 30 kg/m2). Use of agents associated with hypertension: none. History of target organ damage: none. She denies any other c/o today.      Wt Readings from Last 3 Encounters:   03/08/23 (!) 370 lb (167.8 kg)   08/18/22 (!) 373 lb 9.6 oz (169.5 kg)   08/08/22 (!) 366 lb (166 kg)     BP Readings from Last 3 Encounters:   03/08/23 138/86   08/08/22 132/84   05/05/22 (!) 142/84         Preventive Care:  Health Maintenance   Topic Date Due    Cervical cancer screen  Never done    Colorectal Cancer Screen  Never done    COVID-19 Vaccine (4 - Booster for Pfizer series) 05/20/2022    A1C test (Diabetic or Prediabetic)  08/08/2023    Depression Monitoring  03/08/2024    Lipids  03/24/2027    DTaP/Tdap/Td vaccine (2 - Td or Tdap) 08/08/2032    Flu vaccine  Completed    Hepatitis A vaccine  Aged Out    Hib vaccine  Aged Out    Meningococcal (ACWY) vaccine  Aged Out    Pneumococcal 0-64 years Vaccine  Aged Out    Hepatitis C screen  Discontinued    HIV screen  Discontinued      Hx abnormal PAP: no - Never had a pap smear  Sexual activity: none   Self-breast exams: no  Previous DEXA scan: n/a  Last eye exam: 2022, abnormal - she wears glasses, she is nearsighted  Exercise: no regular exercise - she walks occasionally but her knees hurt  Seatbelt use: Yes  Lipid panel:   Lab Results   Component Value Date    CHOL 168 03/24/2022    TRIG 90 03/24/2022    HDL 48 03/24/2022    LDLCALC 102 (H) 03/24/2022        Advance Directive: N    Immunization History   Administered Date(s) Administered    COVID-19, PFIZER GRAY top, DO NOT Dilute, (age 15 y+), IM, 30 mcg/0.3 mL 03/25/2022    COVID-19, PFIZER PURPLE top, DILUTE for use, (age 15 y+), 30mcg/0.3mL 08/24/2021, 09/17/2021    Influenza Virus Vaccine 10/25/2010, 10/05/2011    Influenza, FLUARIX, FLULAVAL, Florance Solo (age 10 mo+) AND AFLURIA, (age 1 y+), PF, 0.5mL 10/30/2015, 10/07/2016, 12/09/2017, 10/08/2018    Influenza, FLUBLOK, (age 25 y+), PF, 0.5mL 11/21/2020, 11/21/2021    Influenza, FLUCELVAX, (age 10 mo+), MDCK, PF, 0.5mL 11/10/2022    Td (Adult), 2 Lf Tetanus Toxoid, Pf (Td, Absorbed) 01/01/2000    Td vaccine (adult) 10/25/2010    Tdap (Boostrix, Adacel) 08/08/2022       No Known Allergies  Outpatient Medications Marked as Taking for the 3/8/23 encounter (Office Visit) with SOCRATES Sanders CNP   Medication Sig Dispense Refill    diclofenac (VOLTAREN) 50 MG EC tablet Take 1 tablet by mouth 3 times daily (with meals) 90 tablet 2    lisinopril-hydroCHLOROthiazide (PRINZIDE;ZESTORETIC) 10-12.5 MG per tablet Take 1 tablet by mouth daily 90 tablet 1    chlorthalidone (HYGROTON) 25 MG tablet Take 1 tablet by mouth daily 90 tablet 0       Past Medical History:   Diagnosis Date    Anxiety     Depression     Hypertension     PCOS (polycystic ovarian syndrome)      Past Surgical History:   Procedure Laterality Date    APPENDECTOMY      TONSILLECTOMY AND ADENOIDECTOMY      TYMPANOSTOMY TUBE PLACEMENT       Family History   Problem Relation Age of Onset    Diabetes Mother     Dementia Mother     High Blood Pressure Father     Pancreatic Cancer Father     Coronary Art Dis Sister         CHF    Pancreatic Cancer Sister         PULM HTN    Cancer Brother 29        BRAIN TUMOR     Social History     Socioeconomic History    Marital status: Single     Spouse name: Not on file    Number of children: 0    Years of education: Not on file    Highest education level: Not on file   Occupational History    Occupation:    Tobacco Use    Smoking status: Never    Smokeless tobacco: Never   Substance and Sexual Activity    Alcohol use: Yes     Comment: RARELY    Drug use: Never    Sexual activity: Not Currently   Other Topics Concern    Not on file   Social History Narrative    Not on file     Social Determinants of Health     Financial Resource Strain: Low Risk     Difficulty of Paying Living Expenses: Not hard at all   Food Insecurity: No Food Insecurity    Worried About Running Out of Food in the Last Year: Never true    [address] in the Last Year: Never true   Transportation Needs: No Transportation Needs    Lack of Transportation (Medical): No    Lack of Transportation (Non-Medical): No   Physical Activity: Not on file   Stress: Not on file   Social Connections: Not on file   Intimate Partner Violence: Not on file   Housing Stability: Unknown    Unable to Pay for Housing in the Last Year: Not on file    Number of Places Lived in the Last Year: Not on file    Unstable Housing in the Last Year: No       Review of Systems:  A comprehensive review of systems was negative except for what was noted in the HPI. Physical Exam:   Vitals:    03/08/23 0814   BP: 138/86   Site: Right Upper Arm   Position: Sitting   Cuff Size: Large Adult   Pulse: 76   SpO2: 99%   Weight: (!) 370 lb (167.8 kg)   Height: 5' (1.524 m)     Body mass index is 72.26 kg/m². Constitutional: She is oriented to person, place, and time. She appears well-developed and well-nourished. No distress. HEENT:   Head: Normocephalic and atraumatic. Right Ear: Tympanic membrane, external ear and ear canal normal.   Left Ear: Tympanic membrane, external ear and ear canal normal.   Nose: Nose normal.   Mouth/Throat: Oropharynx is clear and moist, and mucous membranes are normal.  There is no cervical adenopathy. Eyes: Conjunctivae and extraocular motions are normal. Pupils are equal, round, and reactive to light.    Neck: Neck supple. No JVD present. Carotid bruit is not present. No mass and no thyromegaly present. Cardiovascular: Normal rate, regular rhythm, normal heart sounds and intact distal pulses. Exam reveals no gallop and no friction rub. No murmur heard. Pulmonary/Chest: Effort normal and breath sounds normal. No respiratory distress. She has no wheezes, rhonchi or rales. Abdominal: Soft, non-tender. Bowel sounds and aorta are normal. She exhibits no organomegaly, mass or bruit. Musculoskeletal: Normal range of motion, no synovitis. She exhibits no edema. Neurological: She is alert and oriented to person, place, and time. She has normal reflexes. No cranial nerve deficit. Coordination normal.   Skin: Skin is warm and dry. There is no rash or erythema. No suspicious lesions noted. Psychiatric: She has a normal mood and affect. Her speech is normal and behavior is normal. Judgment, cognition and memory are normal.     Assessment/Plan:    Daxa Cardona was seen today for annual exam and other. Diagnoses and all orders for this visit:    Annual physical exam  Wellness recommendations discussed with patient.     Primary hypertension   Stable - Continue current medication, discussed monitoring her salt intake as well as routinely monitoring her BP - no refills needed at this time    Morbid obesity with BMI of 60.0-69.9, adult (HCC)  - Chronic knee pain discussed - continue Voltaren  - ENCOURAGED TO INCREASE CARDIO ACTIVITY TO AT LEAST  27 MIN3-4 X WEEKLY      Encounter for screening mammogram for malignant neoplasm of breast  Screening breast examination  -     [facility] DIGITAL SCREEN W OR WO CAD BILATERAL - Scheduled July 12th    Lipid screening  -     Lipid, Fasting    Diabetes mellitus screening  -     Comprehensive Metabolic Panel    Colon cancer screening  -     Fecal DNA Colorectal cancer screening (Cologuard)

## 2023-05-31 DIAGNOSIS — I10 PRIMARY HYPERTENSION: ICD-10-CM

## 2023-05-31 RX ORDER — LISINOPRIL AND HYDROCHLOROTHIAZIDE 12.5; 1 MG/1; MG/1
TABLET ORAL
Qty: 90 TABLET | Refills: 1 | Status: SHIPPED | OUTPATIENT
Start: 2023-05-31

## 2023-07-17 ENCOUNTER — HOSPITAL ENCOUNTER (OUTPATIENT)
Dept: MAMMOGRAPHY | Age: 48
Discharge: HOME | End: 2023-07-22
Payer: COMMERCIAL

## 2023-07-17 VITALS — WEIGHT: 293 LBS | HEIGHT: 66 IN | BODY MASS INDEX: 47.09 KG/M2

## 2023-07-17 DIAGNOSIS — Z12.31 VISIT FOR SCREENING MAMMOGRAM: ICD-10-CM

## 2023-07-17 PROCEDURE — 77067 SCR MAMMO BI INCL CAD: CPT

## 2023-11-30 DIAGNOSIS — I10 PRIMARY HYPERTENSION: ICD-10-CM

## 2023-11-30 RX ORDER — LISINOPRIL AND HYDROCHLOROTHIAZIDE 12.5; 1 MG/1; MG/1
1 TABLET ORAL DAILY
Qty: 90 TABLET | Refills: 1 | Status: SHIPPED | OUTPATIENT
Start: 2023-11-30 | End: 2023-11-30 | Stop reason: SDUPTHER

## 2023-11-30 RX ORDER — LISINOPRIL AND HYDROCHLOROTHIAZIDE 12.5; 1 MG/1; MG/1
1 TABLET ORAL DAILY
Qty: 90 TABLET | Refills: 0 | Status: SHIPPED | OUTPATIENT
Start: 2023-11-30

## 2024-05-13 NOTE — TELEPHONE ENCOUNTER
LV 3/8/23 WITH CC FOR PHYSICAL NV NONE  PLEASE CALL PT TO SCHEDULE APPT THEN WE CAN SEND IN REFILL

## 2024-07-18 ENCOUNTER — OFFICE VISIT (OUTPATIENT)
Dept: FAMILY MEDICINE CLINIC | Age: 49
End: 2024-07-18
Payer: COMMERCIAL

## 2024-07-18 VITALS
DIASTOLIC BLOOD PRESSURE: 68 MMHG | SYSTOLIC BLOOD PRESSURE: 122 MMHG | HEIGHT: 66 IN | WEIGHT: 293 LBS | OXYGEN SATURATION: 98 % | BODY MASS INDEX: 47.09 KG/M2 | HEART RATE: 89 BPM

## 2024-07-18 DIAGNOSIS — I10 PRIMARY HYPERTENSION: ICD-10-CM

## 2024-07-18 DIAGNOSIS — E66.01 MORBID OBESITY WITH BMI OF 60.0-69.9, ADULT (HCC): ICD-10-CM

## 2024-07-18 DIAGNOSIS — Z12.11 COLON CANCER SCREENING: ICD-10-CM

## 2024-07-18 DIAGNOSIS — F33.2 SEVERE EPISODE OF RECURRENT MAJOR DEPRESSIVE DISORDER, WITHOUT PSYCHOTIC FEATURES (HCC): ICD-10-CM

## 2024-07-18 DIAGNOSIS — I87.2 VENOUS INSUFFICIENCY OF BOTH LOWER EXTREMITIES: ICD-10-CM

## 2024-07-18 DIAGNOSIS — Z00.00 ANNUAL PHYSICAL EXAM: Primary | ICD-10-CM

## 2024-07-18 DIAGNOSIS — Z13.220 LIPID SCREENING: ICD-10-CM

## 2024-07-18 DIAGNOSIS — R73.03 PREDIABETES: ICD-10-CM

## 2024-07-18 LAB
ALBUMIN SERPL-MCNC: 4 G/DL (ref 3.4–5)
ALBUMIN/GLOB SERPL: 1.3 {RATIO} (ref 1.1–2.2)
ALP SERPL-CCNC: 106 U/L (ref 40–129)
ALT SERPL-CCNC: 11 U/L (ref 10–40)
ANION GAP SERPL CALCULATED.3IONS-SCNC: 14 MMOL/L (ref 3–16)
AST SERPL-CCNC: 11 U/L (ref 15–37)
BILIRUB SERPL-MCNC: 0.4 MG/DL (ref 0–1)
BUN SERPL-MCNC: 11 MG/DL (ref 7–20)
CALCIUM SERPL-MCNC: 9.2 MG/DL (ref 8.3–10.6)
CHLORIDE SERPL-SCNC: 101 MMOL/L (ref 99–110)
CHOLEST SERPL-MCNC: 181 MG/DL (ref 0–199)
CO2 SERPL-SCNC: 23 MMOL/L (ref 21–32)
CREAT SERPL-MCNC: 0.5 MG/DL (ref 0.6–1.1)
EST. AVERAGE GLUCOSE BLD GHB EST-MCNC: 134.1 MG/DL
GFR SERPLBLD CREATININE-BSD FMLA CKD-EPI: >90 ML/MIN/{1.73_M2}
GLUCOSE SERPL-MCNC: 115 MG/DL (ref 70–99)
HBA1C MFR BLD: 6.3 %
HDLC SERPL-MCNC: 51 MG/DL (ref 40–60)
LDL CHOLESTEROL: 109 MG/DL
POTASSIUM SERPL-SCNC: 4.5 MMOL/L (ref 3.5–5.1)
PROT SERPL-MCNC: 7.1 G/DL (ref 6.4–8.2)
SODIUM SERPL-SCNC: 138 MMOL/L (ref 136–145)
TRIGL SERPL-MCNC: 106 MG/DL (ref 0–150)
VLDLC SERPL CALC-MCNC: 21 MG/DL

## 2024-07-18 PROCEDURE — 3078F DIAST BP <80 MM HG: CPT | Performed by: NURSE PRACTITIONER

## 2024-07-18 PROCEDURE — 99214 OFFICE O/P EST MOD 30 MIN: CPT | Performed by: NURSE PRACTITIONER

## 2024-07-18 PROCEDURE — 3074F SYST BP LT 130 MM HG: CPT | Performed by: NURSE PRACTITIONER

## 2024-07-18 PROCEDURE — 99396 PREV VISIT EST AGE 40-64: CPT | Performed by: NURSE PRACTITIONER

## 2024-07-18 RX ORDER — FUROSEMIDE 20 MG/1
TABLET ORAL
Qty: 60 TABLET | Refills: 1 | Status: SHIPPED | OUTPATIENT
Start: 2024-07-18

## 2024-07-18 RX ORDER — LISINOPRIL AND HYDROCHLOROTHIAZIDE 12.5; 1 MG/1; MG/1
1 TABLET ORAL DAILY
Qty: 90 TABLET | Refills: 3 | Status: SHIPPED | OUTPATIENT
Start: 2024-07-18

## 2024-07-18 RX ORDER — VILAZODONE HYDROCHLORIDE 20 MG/1
TABLET ORAL
Qty: 60 TABLET | Refills: 0 | Status: SHIPPED | OUTPATIENT
Start: 2024-07-18

## 2024-07-18 SDOH — ECONOMIC STABILITY: INCOME INSECURITY: HOW HARD IS IT FOR YOU TO PAY FOR THE VERY BASICS LIKE FOOD, HOUSING, MEDICAL CARE, AND HEATING?: NOT HARD AT ALL

## 2024-07-18 SDOH — ECONOMIC STABILITY: FOOD INSECURITY: WITHIN THE PAST 12 MONTHS, YOU WORRIED THAT YOUR FOOD WOULD RUN OUT BEFORE YOU GOT MONEY TO BUY MORE.: NEVER TRUE

## 2024-07-18 SDOH — ECONOMIC STABILITY: FOOD INSECURITY: WITHIN THE PAST 12 MONTHS, THE FOOD YOU BOUGHT JUST DIDN'T LAST AND YOU DIDN'T HAVE MONEY TO GET MORE.: NEVER TRUE

## 2024-07-18 ASSESSMENT — COLUMBIA-SUICIDE SEVERITY RATING SCALE - C-SSRS
6. IN YOUR LIFETIME, HAVE YOU EVER DONE ANYTHING, STARTED TO DO ANYTHING, OR PREPARED TO DO ANYTHING TO END YOUR LIFE?: NO
2. IN THE PAST MONTH, HAVE YOU ACTUALLY HAD ANY THOUGHTS OF KILLING YOURSELF?: NO
1. IN THE PAST MONTH, HAVE YOU WISHED YOU WERE DEAD OR WISHED YOU COULD GO TO SLEEP AND NOT WAKE UP?: NO

## 2024-07-18 ASSESSMENT — PATIENT HEALTH QUESTIONNAIRE - PHQ9
5. POOR APPETITE OR OVEREATING: NEARLY EVERY DAY
10. IF YOU CHECKED OFF ANY PROBLEMS, HOW DIFFICULT HAVE THESE PROBLEMS MADE IT FOR YOU TO DO YOUR WORK, TAKE CARE OF THINGS AT HOME, OR GET ALONG WITH OTHER PEOPLE: SOMEWHAT DIFFICULT
SUM OF ALL RESPONSES TO PHQ QUESTIONS 1-9: 19
SUM OF ALL RESPONSES TO PHQ QUESTIONS 1-9: 18
2. FEELING DOWN, DEPRESSED OR HOPELESS: NEARLY EVERY DAY
SUM OF ALL RESPONSES TO PHQ QUESTIONS 1-9: 19
2. FEELING DOWN, DEPRESSED OR HOPELESS: NEARLY EVERY DAY
6. FEELING BAD ABOUT YOURSELF - OR THAT YOU ARE A FAILURE OR HAVE LET YOURSELF OR YOUR FAMILY DOWN: NEARLY EVERY DAY
SUM OF ALL RESPONSES TO PHQ QUESTIONS 1-9: 19
9. THOUGHTS THAT YOU WOULD BE BETTER OFF DEAD, OR OF HURTING YOURSELF: SEVERAL DAYS
3. TROUBLE FALLING OR STAYING ASLEEP: SEVERAL DAYS
9. THOUGHTS THAT YOU WOULD BE BETTER OFF DEAD, OR OF HURTING YOURSELF: SEVERAL DAYS
8. MOVING OR SPEAKING SO SLOWLY THAT OTHER PEOPLE COULD HAVE NOTICED. OR THE OPPOSITE - BEING SO FIDGETY OR RESTLESS THAT YOU HAVE BEEN MOVING AROUND A LOT MORE THAN USUAL: SEVERAL DAYS
SUM OF ALL RESPONSES TO PHQ9 QUESTIONS 1 & 2: 5
7. TROUBLE CONCENTRATING ON THINGS, SUCH AS READING THE NEWSPAPER OR WATCHING TELEVISION: MORE THAN HALF THE DAYS
10. IF YOU CHECKED OFF ANY PROBLEMS, HOW DIFFICULT HAVE THESE PROBLEMS MADE IT FOR YOU TO DO YOUR WORK, TAKE CARE OF THINGS AT HOME, OR GET ALONG WITH OTHER PEOPLE: SOMEWHAT DIFFICULT
SUM OF ALL RESPONSES TO PHQ9 QUESTIONS 1 & 2: 5
5. POOR APPETITE OR OVEREATING: NEARLY EVERY DAY
4. FEELING TIRED OR HAVING LITTLE ENERGY: NEARLY EVERY DAY
1. LITTLE INTEREST OR PLEASURE IN DOING THINGS: MORE THAN HALF THE DAYS
7. TROUBLE CONCENTRATING ON THINGS, SUCH AS READING THE NEWSPAPER OR WATCHING TELEVISION: MORE THAN HALF THE DAYS
1. LITTLE INTEREST OR PLEASURE IN DOING THINGS: MORE THAN HALF THE DAYS
6. FEELING BAD ABOUT YOURSELF - OR THAT YOU ARE A FAILURE OR HAVE LET YOURSELF OR YOUR FAMILY DOWN: NEARLY EVERY DAY
3. TROUBLE FALLING OR STAYING ASLEEP: SEVERAL DAYS
8. MOVING OR SPEAKING SO SLOWLY THAT OTHER PEOPLE COULD HAVE NOTICED. OR THE OPPOSITE, BEING SO FIGETY OR RESTLESS THAT YOU HAVE BEEN MOVING AROUND A LOT MORE THAN USUAL: SEVERAL DAYS
4. FEELING TIRED OR HAVING LITTLE ENERGY: NEARLY EVERY DAY
SUM OF ALL RESPONSES TO PHQ QUESTIONS 1-9: 19

## 2024-07-18 NOTE — PROGRESS NOTES
History and Physical      Fani Mar  YOB: 1975    Date of Service:  7/18/2024    Chief Complaint:   Fani Mar is a 48 y.o. female who presents for complete physical examination.    HPI:   DEPRESSION   FANI HAS BEEN TREATED IN THE PAST FOR DEPRESSION THE LAST TIME WAS ABOUT TEN YEARS AGO  PAXIL- WELLBUTRIN - CELEXA- EFFEXOR- LEXAPRO- ELAVIL- TOPAMAX OFF LABEL USE-   CELEXA WORKED THE BEST IN REGARDS TO ENERGY BUT DID NOT HELP WITH  THE DEPRESSION  HER MOOD HAS BEEN AN ISSUE FOR QUITE SOME TIME - WORK HAS  LOT TO DO WITH IT THEY ARE PUSHING HER A LOT TO GET A LOT OF STUFF DONE FEELS BURNED OUT- SHE HAS HAD THOUGHTS  OF SELF HARM BUT NO PLAN \" IF SHE WAS DEAD SHE COULD GET A BREAK\"  SHE HAS SEEN A THERAPIST IN THE PAST  A FEW TIMES BUT THEY DID MOST  OF THE TALKING       C/O LOWER EXTREMITY SWELLING IN  BOTH FEET WORSE AS THE DAY GOES ON- THE TOP OF HER FEET ITCH  SHE IS NOT WEARING HER COMPRESSION STOCKINGS  SHE DENIES ANY CP- SOB      Wt Readings from Last 3 Encounters:   07/17/23 (!) 163.3 kg (360 lb)   03/08/23 (!) 167.8 kg (370 lb)   08/18/22 (!) 169.5 kg (373 lb 9.6 oz)     BP Readings from Last 3 Encounters:   03/08/23 138/86   08/08/22 132/84   05/05/22 (!) 142/84       There is no problem list on file for this patient.      Preventive Care:  Health Maintenance   Topic Date Due    Hepatitis B vaccine (1 of 3 - 3-dose series) Never done    Cervical cancer screen  Never done    Colorectal Cancer Screen  Never done    A1C test (Diabetic or Prediabetic)  08/08/2023    COVID-19 Vaccine (4 - 2023-24 season) 09/01/2023    Depression Screen  03/08/2024    Flu vaccine (1) 08/01/2024    Breast cancer screen  07/17/2025    Lipids  03/08/2028    DTaP/Tdap/Td vaccine (2 - Td or Tdap) 08/08/2032    Hepatitis A vaccine  Aged Out    Hib vaccine  Aged Out    Polio vaccine  Aged Out    Meningococcal (ACWY) vaccine  Aged Out    Pneumococcal 0-64 years Vaccine  Aged Out    Depression Monitoring

## 2024-07-18 NOTE — PATIENT INSTRUCTIONS
GENERAL OFFICE POLICIES    VENOUS INSUFFICIENCY  Telephone Calls: Messages will be answered within 1-2 business days, unless the provider is out of the office.  If it is urgent a covering provider will answer. (this does not include Medication refills).    MyChart:  We recommend all patients sign up for [facility].  Through this portal you can see your lab results, request refills, schedule appointments, pay your bill and send messages to the office.   [facility] messages will be answered within 1-2 business days unless the provider is out of the office.  For urgent matters, please call the office.  Appointments:  All appointments must be scheduled.  We ask all patients to schedule their next follow up appointment before they leave the office to make sure you will be able to be seen before you run out of medications.  24 hours notice is required to cancel or reschedule an appointment to avoid being marked as a no show.  You may be dismissed from the practice after 3 no shows.    LATE for Appointment: If you are 15 or more minutes late for your appointment, you may be asked to reschedule.  MA/LAB APPTS: Must be scheduled, cannot accept walk in lab visits.  We only draw labs for patients established in our office.  We only do injections for medications ordered by our office.  Acute Sick Visits:  Nothing other than acute complaint will be addressed at this visit.  TRADITIONAL MEDICARE  DOES NOT COVER PHYSICALS  MEDICARE WELLNESS VISITS: These are NOT physicals but the free annual visit offered by Medicare to discuss wellness issues. Medication refills, checkups, etc. will not be addressed during this visit.  Medication Refills: Refills are handled electronically so please contact your pharmacy for medication refills even if current refills have been exhausted. If you are on a controlled medication you will be referred to a specialist (pain specialist, psychiatry, etc).   Forms: There is a $35 fee to fill out FMLA/Disability

## 2024-08-19 DIAGNOSIS — F33.2 SEVERE EPISODE OF RECURRENT MAJOR DEPRESSIVE DISORDER, WITHOUT PSYCHOTIC FEATURES (HCC): ICD-10-CM

## 2024-08-19 RX ORDER — VILAZODONE HYDROCHLORIDE 20 MG/1
TABLET ORAL
Qty: 34 TABLET | Refills: 0 | Status: SHIPPED | OUTPATIENT
Start: 2024-08-19

## 2024-08-27 NOTE — PATIENT INSTRUCTIONS

## 2024-08-29 ENCOUNTER — OFFICE VISIT (OUTPATIENT)
Dept: FAMILY MEDICINE CLINIC | Age: 49
End: 2024-08-29
Payer: COMMERCIAL

## 2024-08-29 VITALS
WEIGHT: 293 LBS | HEIGHT: 66 IN | OXYGEN SATURATION: 97 % | SYSTOLIC BLOOD PRESSURE: 132 MMHG | BODY MASS INDEX: 47.09 KG/M2 | DIASTOLIC BLOOD PRESSURE: 82 MMHG | HEART RATE: 100 BPM

## 2024-08-29 DIAGNOSIS — F33.0 MILD EPISODE OF RECURRENT MAJOR DEPRESSIVE DISORDER (HCC): Primary | ICD-10-CM

## 2024-08-29 PROCEDURE — 99213 OFFICE O/P EST LOW 20 MIN: CPT | Performed by: NURSE PRACTITIONER

## 2024-08-29 RX ORDER — VILAZODONE HYDROCHLORIDE 20 MG/1
TABLET ORAL
Qty: 90 TABLET | Refills: 1 | Status: SHIPPED | OUTPATIENT
Start: 2024-08-29

## 2024-08-29 ASSESSMENT — PATIENT HEALTH QUESTIONNAIRE - PHQ9
SUM OF ALL RESPONSES TO PHQ QUESTIONS 1-9: 5
1. LITTLE INTEREST OR PLEASURE IN DOING THINGS: SEVERAL DAYS
5. POOR APPETITE OR OVEREATING: SEVERAL DAYS
10. IF YOU CHECKED OFF ANY PROBLEMS, HOW DIFFICULT HAVE THESE PROBLEMS MADE IT FOR YOU TO DO YOUR WORK, TAKE CARE OF THINGS AT HOME, OR GET ALONG WITH OTHER PEOPLE: SOMEWHAT DIFFICULT
8. MOVING OR SPEAKING SO SLOWLY THAT OTHER PEOPLE COULD HAVE NOTICED. OR THE OPPOSITE, BEING SO FIGETY OR RESTLESS THAT YOU HAVE BEEN MOVING AROUND A LOT MORE THAN USUAL: NOT AT ALL
3. TROUBLE FALLING OR STAYING ASLEEP: SEVERAL DAYS
SUM OF ALL RESPONSES TO PHQ9 QUESTIONS 1 & 2: 2
SUM OF ALL RESPONSES TO PHQ QUESTIONS 1-9: 5
9. THOUGHTS THAT YOU WOULD BE BETTER OFF DEAD, OR OF HURTING YOURSELF: NOT AT ALL
6. FEELING BAD ABOUT YOURSELF - OR THAT YOU ARE A FAILURE OR HAVE LET YOURSELF OR YOUR FAMILY DOWN: SEVERAL DAYS
SUM OF ALL RESPONSES TO PHQ QUESTIONS 1-9: 5
4. FEELING TIRED OR HAVING LITTLE ENERGY: NOT AT ALL
7. TROUBLE CONCENTRATING ON THINGS, SUCH AS READING THE NEWSPAPER OR WATCHING TELEVISION: NOT AT ALL
2. FEELING DOWN, DEPRESSED OR HOPELESS: SEVERAL DAYS
SUM OF ALL RESPONSES TO PHQ QUESTIONS 1-9: 5

## 2024-08-29 NOTE — PROGRESS NOTES
Fani Mar (:  1975) is a 49 y.o. female,Established patient, here for evaluation of the following chief complaint(s):    Depression (6 wk f/u) and Other (Due for pap)      SUBJECTIVE/OBJECTIVE:  HPI    Routine depression  follow up  Taking the viibryd as prescribed  She noticed a change in her mood she was not crying when she got dressed  She finds herself singing   Feels much better - like she can smile again   Has not had any issues with the medication     Review of Systems   Psychiatric/Behavioral: Negative.         Physical Exam  Vitals reviewed.   Constitutional:       General: She is awake. She is not in acute distress.     Appearance: Normal appearance. She is well-developed and well-groomed. She is not ill-appearing, toxic-appearing or diaphoretic.   Cardiovascular:      Rate and Rhythm: Normal rate and regular rhythm.      Heart sounds: Normal heart sounds and S1 normal.   Neurological:      Mental Status: She is alert and oriented to person, place, and time.   Psychiatric:         Attention and Perception: Attention and perception normal.         Mood and Affect: Mood and affect normal.         Speech: Speech normal.         Behavior: Behavior normal. Behavior is cooperative.         Thought Content: Thought content normal.         Cognition and Memory: Cognition and memory normal.         Judgment: Judgment normal.       Fani was seen today for depression and other.    Diagnoses and all orders for this visit:    Mild episode of recurrent major depressive disorder (HCC)  PHQ-9 Total Score: 5 (2024 11:29 AM)  Thoughts that you would be better off dead, or of hurting yourself in some way: 0 (2024 11:29 AM)   NO DOSE INCREASE  AT THIS TIME- DOING WELL PER PT - SHE DOES NOT WANT TO CHANGE  CONTINUE VIIBRYD 20 MG AS PRESCRIBED  FOLLOW UP IN SIX MONTHS  -     vilazodone HCl (VIIBRYD) 20 MG TABS; 1 TABLET BY MOUTH DAILY           An electronic signature was used to authenticate this

## 2024-10-21 ENCOUNTER — OFFICE VISIT (OUTPATIENT)
Dept: FAMILY MEDICINE CLINIC | Age: 49
End: 2024-10-21
Payer: COMMERCIAL

## 2024-10-21 VITALS
BODY MASS INDEX: 48.82 KG/M2 | OXYGEN SATURATION: 98 % | WEIGHT: 293 LBS | HEART RATE: 89 BPM | HEIGHT: 65 IN | DIASTOLIC BLOOD PRESSURE: 82 MMHG | SYSTOLIC BLOOD PRESSURE: 138 MMHG

## 2024-10-21 DIAGNOSIS — L97.829 VENOUS STASIS ULCER OF OTHER PART OF LEFT LOWER LEG, UNSPECIFIED ULCER STAGE, UNSPECIFIED WHETHER VARICOSE VEINS PRESENT (HCC): ICD-10-CM

## 2024-10-21 DIAGNOSIS — I87.2 VENOUS INSUFFICIENCY OF BOTH LOWER EXTREMITIES: Primary | ICD-10-CM

## 2024-10-21 DIAGNOSIS — Z23 NEED FOR INFLUENZA VACCINATION: ICD-10-CM

## 2024-10-21 DIAGNOSIS — I83.028 VENOUS STASIS ULCER OF OTHER PART OF LEFT LOWER LEG, UNSPECIFIED ULCER STAGE, UNSPECIFIED WHETHER VARICOSE VEINS PRESENT (HCC): ICD-10-CM

## 2024-10-21 PROCEDURE — 90471 IMMUNIZATION ADMIN: CPT | Performed by: NURSE PRACTITIONER

## 2024-10-21 PROCEDURE — 90661 CCIIV3 VAC ABX FR 0.5 ML IM: CPT | Performed by: NURSE PRACTITIONER

## 2024-10-21 PROCEDURE — 99213 OFFICE O/P EST LOW 20 MIN: CPT | Performed by: NURSE PRACTITIONER

## 2024-10-21 NOTE — PROGRESS NOTES
Fani Mar (:  1975) is a 49 y.o. female,Established patient, here for evaluation of the following chief complaint(s):    Lesion(s) (Left leg, possible ulcer x2-3 weeks, painful especially at night), Other (Due for pap), and Flu Vaccine (Would like flu shot)      SUBJECTIVE/OBJECTIVE:  HPI  FANI C/O OPEN AREA SORE TO THE BACK OF HER LEFT LEG X 3 WEEKS  IT HURTS  STABBING SENSATION  OOZING- WEEPING OF YELLOW RED STUFF  BURNING  SENSATION   CLEANING WITH SOAP AND WATER  CANNOT WEAR COMPRESSION STOCKINGS BECAUSE THEY HURT  Review of Systems  Per HPI  Physical Exam  Constitutional:       General: She is awake.      Appearance: Normal appearance. She is well-developed and well-groomed. She is morbidly obese. She is not ill-appearing, toxic-appearing or diaphoretic.   Skin:            Comments: Stasis ulcer noted to back of left leg- see picture in media   Neurological:      Mental Status: She is alert and oriented to person, place, and time.   Psychiatric:         Attention and Perception: Attention and perception normal.         Mood and Affect: Mood and affect normal.         Speech: Speech normal.         Behavior: Behavior normal. Behavior is cooperative.         Cognition and Memory: Cognition and memory normal.         Judgment: Judgment normal.         Fani was seen today for lesion(s), other and flu vaccine.    Diagnoses and all orders for this visit:    Venous insufficiency of both lower extremities  -     Pavan Whelan MD, Vascular/Endovascular Surgery, [facility]  -     [facility]    Venous stasis ulcer of other part of left lower leg, unspecified ulcer stage, unspecified whether varicose veins present (HCC)  -     [facility]  And instructed to elevate feet is much as possible  Limit sodium increasing water intake to at least 90 to 100 ounces daily  At this time she cannot wear her compression

## 2024-10-21 NOTE — PROGRESS NOTES
Immunizations Administered       Name Date Dose Route    Influenza, FLUCELVAX, (age 6 mo+) IM, Trivalent PF, 0.5mL 10/21/2024 0.5 mL Intramuscular    Site: Deltoid- Left    Lot: [id number]    NDC: 39035-833-15

## 2024-10-24 NOTE — PATIENT INSTRUCTIONS
[facility]  [address]   [place], [place] [address]  Telephone: [phone]     FAX [phone]    Discharge Instructions    Important reminders:    **If you have any signs and symptoms of illness (Cough, fever, congestion, nausea, vomiting, diarrhea, etc.) please call the wound care center prior to your appointment.    1. Increase Protein intake for optimal wound healing  2. No added salt to reduce any swelling  3. If diabetic, maintain good glucose control  4. If you smoke, smoking prohibits wound healing, we ask that you refrain from smoking.  5. When taking antibiotics take the entire prescription as ordered. Do not stop taking until medication is all gone unless otherwise instructed.   6. Exercise as tolerated.   7. Keep weight off wounds and reposition every 2 hours if applicable.  8. If wound(s) is on your lower extremity, elevate legs to the level of the heart or above for 30 minutes 4-5 times a day and/or when sitting. Avoid standing for long periods of time.   9. Do not get wounds wet in bath or shower unless otherwise instructed by your physician. If your wound is on your foot or leg, you may purchase a cast bag. Please ask at the pharmacy.      If Vascular testing is ordered, please call [phone] to schedule.    Vascular tests ordered by Wound Care Physicians may take up to 2 hours to complete. Please keep that in mind when scheduling.     If Vascular testing is scheduled, please bring supplies to replace your dressing after testing is done. The vascular department does not stock supplies.     Wound: Left leg     With each dressing change, rinse wounds with 0.9% Saline. (May use wound wash or soft contact solution. Both can be purchased at a local drug store). If unable to obtain saline, may use a gentle soap and water.    Dressing care:  antibiotics. Take diuretic as ordered. Do not get leg wet in shower. Can get a cast back. Triad and gentamicin cream, Optilock,

## 2024-10-25 ENCOUNTER — HOSPITAL ENCOUNTER (OUTPATIENT)
Dept: WOUND CARE | Age: 49
Discharge: HOME | End: 2024-10-25
Attending: PODIATRIST
Payer: COMMERCIAL

## 2024-10-25 VITALS — DIASTOLIC BLOOD PRESSURE: 84 MMHG | SYSTOLIC BLOOD PRESSURE: 161 MMHG | HEART RATE: 98 BPM | TEMPERATURE: 97.6 F

## 2024-10-25 DIAGNOSIS — L97.223 NON-PRESSURE CHRONIC ULCER OF LEFT CALF WITH NECROSIS OF MUSCLE (HCC): ICD-10-CM

## 2024-10-25 DIAGNOSIS — I73.89 OTHER SPECIFIED PERIPHERAL VASCULAR DISEASES (HCC): ICD-10-CM

## 2024-10-25 DIAGNOSIS — R60.0 EDEMA LEG: ICD-10-CM

## 2024-10-25 DIAGNOSIS — I89.0 LYMPHEDEMA: Primary | ICD-10-CM

## 2024-10-25 PROCEDURE — 87186 SC STD MICRODIL/AGAR DIL: CPT

## 2024-10-25 PROCEDURE — 29581 APPL MULTLAYER CMPRN SYS LEG: CPT

## 2024-10-25 PROCEDURE — 11043 DBRDMT MUSC&/FSCA 1ST 20/<: CPT

## 2024-10-25 PROCEDURE — 87205 SMEAR GRAM STAIN: CPT

## 2024-10-25 PROCEDURE — 87077 CULTURE AEROBIC IDENTIFY: CPT

## 2024-10-25 PROCEDURE — 87070 CULTURE OTHR SPECIMN AEROBIC: CPT

## 2024-10-25 RX ORDER — LIDOCAINE HYDROCHLORIDE 40 MG/ML
SOLUTION TOPICAL ONCE
OUTPATIENT
Start: 2024-10-25 | End: 2024-10-25

## 2024-10-25 RX ORDER — CLOBETASOL PROPIONATE 0.5 MG/G
OINTMENT TOPICAL ONCE
OUTPATIENT
Start: 2024-10-25 | End: 2024-10-25

## 2024-10-25 RX ORDER — SODIUM CHLOR/HYPOCHLOROUS ACID 0.033 %
SOLUTION, IRRIGATION IRRIGATION ONCE
OUTPATIENT
Start: 2024-10-25 | End: 2024-10-25

## 2024-10-25 RX ORDER — LIDOCAINE HYDROCHLORIDE 20 MG/ML
JELLY TOPICAL ONCE
OUTPATIENT
Start: 2024-10-25 | End: 2024-10-25

## 2024-10-25 RX ORDER — GINSENG 100 MG
CAPSULE ORAL ONCE
OUTPATIENT
Start: 2024-10-25 | End: 2024-10-25

## 2024-10-25 RX ORDER — TRIAMCINOLONE ACETONIDE 1 MG/G
OINTMENT TOPICAL ONCE
OUTPATIENT
Start: 2024-10-25 | End: 2024-10-25

## 2024-10-25 RX ORDER — GENTAMICIN SULFATE 1 MG/G
OINTMENT TOPICAL ONCE
OUTPATIENT
Start: 2024-10-25 | End: 2024-10-25

## 2024-10-25 RX ORDER — MUPIROCIN 20 MG/G
OINTMENT TOPICAL ONCE
OUTPATIENT
Start: 2024-10-25 | End: 2024-10-25

## 2024-10-25 RX ORDER — SILVER SULFADIAZINE 10 MG/G
CREAM TOPICAL ONCE
OUTPATIENT
Start: 2024-10-25 | End: 2024-10-25

## 2024-10-25 RX ORDER — CIPROFLOXACIN 500 MG/1
500 TABLET, FILM COATED ORAL 2 TIMES DAILY
Qty: 20 TABLET | Refills: 0 | Status: SHIPPED | OUTPATIENT
Start: 2024-10-25 | End: 2024-11-04

## 2024-10-25 RX ORDER — LIDOCAINE 50 MG/G
OINTMENT TOPICAL ONCE
OUTPATIENT
Start: 2024-10-25 | End: 2024-10-25

## 2024-10-25 RX ORDER — BACITRACIN ZINC AND POLYMYXIN B SULFATE 500; 1000 [USP'U]/G; [USP'U]/G
OINTMENT TOPICAL ONCE
OUTPATIENT
Start: 2024-10-25 | End: 2024-10-25

## 2024-10-25 RX ORDER — NEOMYCIN/BACITRACIN/POLYMYXINB 3.5-400-5K
OINTMENT (GRAM) TOPICAL ONCE
OUTPATIENT
Start: 2024-10-25 | End: 2024-10-25

## 2024-10-25 RX ORDER — LIDOCAINE 40 MG/G
CREAM TOPICAL ONCE
OUTPATIENT
Start: 2024-10-25 | End: 2024-10-25

## 2024-10-25 RX ORDER — BETAMETHASONE DIPROPIONATE 0.5 MG/G
CREAM TOPICAL ONCE
OUTPATIENT
Start: 2024-10-25 | End: 2024-10-25

## 2024-10-25 ASSESSMENT — PAIN DESCRIPTION - ORIENTATION: ORIENTATION: LEFT

## 2024-10-25 ASSESSMENT — PAIN DESCRIPTION - FREQUENCY: FREQUENCY: CONTINUOUS

## 2024-10-25 ASSESSMENT — PAIN - FUNCTIONAL ASSESSMENT: PAIN_FUNCTIONAL_ASSESSMENT: PREVENTS OR INTERFERES SOME ACTIVE ACTIVITIES AND ADLS

## 2024-10-25 ASSESSMENT — PAIN DESCRIPTION - PAIN TYPE: TYPE: CHRONIC PAIN

## 2024-10-25 ASSESSMENT — PAIN DESCRIPTION - ONSET: ONSET: ON-GOING

## 2024-10-25 ASSESSMENT — PAIN DESCRIPTION - LOCATION: LOCATION: LEG

## 2024-10-25 ASSESSMENT — PAIN SCALES - GENERAL: PAINLEVEL_OUTOF10: 5

## 2024-10-25 NOTE — PLAN OF CARE
Discharge instructions given.  Patient verbalized understanding.  Return to [facility] in 1 week(s).  Rx to Pharmacy. Cx to lab

## 2024-10-25 NOTE — PROGRESS NOTES
Multilayer Compression Wrap   Below the Knee    NAME:  Fani Mar  YOB: 1975  MEDICAL RECORD NUMBER:  [id number]  DATE:  10/25/2024    Multilayer compression wrap: Applied primary and secondary dressing as ordered.  Applied multilayered dressing below the knee to left lower leg.  Instructed patient/caregiver not to remove dressing and to keep it clean and dry.   Instructed patient/caregiver on complications to report to provider, such as pain, numbness in toes, heavy drainage, and slippage of dressing.  Instructed patient on purpose of compression dressing and on activity and exercise recommendations.     Applied  2 layer wrap from toes to knee overlapping each time    Electronically signed by GILL JIMENEZ RN on 10/25/2024 at 9:46 AM

## 2024-10-26 NOTE — PROGRESS NOTES
[facility]  Progress Note and Procedure Note      Fani Mar  AGE: 49 y.o.   GENDER: female  : 1975  TODAY'S DATE:  10/25/2024    Subjective:     Chief Complaint   Patient presents with    Wound Check     Left Posterior Leg Np         HISTORY of PRESENT ILLNESS HPI     Fani Mar is a 49 y.o. female who presents today for wound evaluation.   History of Wound: Patient elects to having wound for at least 4 weeks on the back of her leg.  She states it is a venous stasis ulceration.  She admits to significant drainage and odor.  Wound Pain:  intermittent  Severity:  3 / 10   Wound Type:  venous  Modifying Factors:  edema, venous stasis, and lymphedema  Associated Signs/Symptoms:  edema, erythema, drainage, and odor        PAST MEDICAL HISTORY        Diagnosis Date    Anxiety     Depression     Hypertension     PCOS (polycystic ovarian syndrome)        PAST SURGICAL HISTORY    Past Surgical History:   Procedure Laterality Date    APPENDECTOMY      TONSILLECTOMY AND ADENOIDECTOMY      TYMPANOSTOMY TUBE PLACEMENT         FAMILY HISTORY    Family History   Problem Relation Age of Onset    Diabetes Mother     Dementia Mother     High Blood Pressure Father     Pancreatic Cancer Father     Cancer Father         Pancreatic    Coronary Art Dis Sister         Congestive heart failure with pulmonary hypertension    Pancreatic Cancer Sister         PULM HTN    Cancer Brother 34        Brain tumor       SOCIAL HISTORY    Social History     Tobacco Use    Smoking status: Never     Passive exposure: Never    Smokeless tobacco: Never   Substance Use Topics    Alcohol use: Yes     Comment: 1 glass on holidays/spec. occassions, avg < 1/month    Drug use: Never       ALLERGIES    No Known Allergies    MEDICATIONS    Current Outpatient Medications on File Prior to Encounter   Medication Sig Dispense Refill    diclofenac (VOLTAREN) 50 MG EC tablet TAKE 1 TABLET BY MOUTH 3 TIMES A DAY WITH A MEAL 90

## 2024-10-27 LAB
BACTERIA SPEC AEROBE CULT: ABNORMAL
GRAM STN SPEC: ABNORMAL
ORGANISM: ABNORMAL

## 2024-10-28 ENCOUNTER — HOSPITAL ENCOUNTER (OUTPATIENT)
Dept: WOUND CARE | Age: 49
Discharge: HOME | End: 2024-10-28
Attending: PODIATRIST
Payer: COMMERCIAL

## 2024-10-28 DIAGNOSIS — R60.0 EDEMA LEG: ICD-10-CM

## 2024-10-28 DIAGNOSIS — I89.0 LYMPHEDEMA: ICD-10-CM

## 2024-10-28 DIAGNOSIS — L97.223 NON-PRESSURE CHRONIC ULCER OF LEFT CALF WITH NECROSIS OF MUSCLE (HCC): ICD-10-CM

## 2024-10-28 DIAGNOSIS — I73.89 OTHER SPECIFIED PERIPHERAL VASCULAR DISEASES (HCC): Primary | ICD-10-CM

## 2024-10-28 PROCEDURE — 29581 APPL MULTLAYER CMPRN SYS LEG: CPT

## 2024-10-28 RX ORDER — LIDOCAINE 50 MG/G
OINTMENT TOPICAL ONCE
OUTPATIENT
Start: 2024-10-28 | End: 2024-10-28

## 2024-10-28 RX ORDER — NEOMYCIN/BACITRACIN/POLYMYXINB 3.5-400-5K
OINTMENT (GRAM) TOPICAL ONCE
OUTPATIENT
Start: 2024-10-28 | End: 2024-10-28

## 2024-10-28 RX ORDER — GENTAMICIN SULFATE 1 MG/G
OINTMENT TOPICAL ONCE
OUTPATIENT
Start: 2024-10-28 | End: 2024-10-28

## 2024-10-28 RX ORDER — TRIAMCINOLONE ACETONIDE 1 MG/G
OINTMENT TOPICAL ONCE
OUTPATIENT
Start: 2024-10-28 | End: 2024-10-28

## 2024-10-28 RX ORDER — LIDOCAINE HYDROCHLORIDE 20 MG/ML
JELLY TOPICAL ONCE
OUTPATIENT
Start: 2024-10-28 | End: 2024-10-28

## 2024-10-28 RX ORDER — LIDOCAINE 40 MG/G
CREAM TOPICAL ONCE
Status: CANCELLED | OUTPATIENT
Start: 2024-10-28 | End: 2024-10-28

## 2024-10-28 RX ORDER — SODIUM CHLOR/HYPOCHLOROUS ACID 0.033 %
SOLUTION, IRRIGATION IRRIGATION ONCE
OUTPATIENT
Start: 2024-10-28 | End: 2024-10-28

## 2024-10-28 RX ORDER — GINSENG 100 MG
CAPSULE ORAL ONCE
OUTPATIENT
Start: 2024-10-28 | End: 2024-10-28

## 2024-10-28 RX ORDER — BETAMETHASONE DIPROPIONATE 0.5 MG/G
CREAM TOPICAL ONCE
OUTPATIENT
Start: 2024-10-28 | End: 2024-10-28

## 2024-10-28 RX ORDER — CLOBETASOL PROPIONATE 0.5 MG/G
OINTMENT TOPICAL ONCE
OUTPATIENT
Start: 2024-10-28 | End: 2024-10-28

## 2024-10-28 RX ORDER — MUPIROCIN 20 MG/G
OINTMENT TOPICAL ONCE
OUTPATIENT
Start: 2024-10-28 | End: 2024-10-28

## 2024-10-28 RX ORDER — BACITRACIN ZINC AND POLYMYXIN B SULFATE 500; 1000 [USP'U]/G; [USP'U]/G
OINTMENT TOPICAL ONCE
OUTPATIENT
Start: 2024-10-28 | End: 2024-10-28

## 2024-10-28 RX ORDER — LIDOCAINE HYDROCHLORIDE 40 MG/ML
SOLUTION TOPICAL ONCE
OUTPATIENT
Start: 2024-10-28 | End: 2024-10-28

## 2024-10-28 RX ORDER — SILVER SULFADIAZINE 10 MG/G
CREAM TOPICAL ONCE
OUTPATIENT
Start: 2024-10-28 | End: 2024-10-28

## 2024-10-28 NOTE — PROGRESS NOTES
Multilayer Compression Wrap   Below the Knee    NAME:  Fani Mar  YOB: 1975  MEDICAL RECORD NUMBER:  [id number]  DATE:  10/28/2024    Multilayer compression wrap: Removed old Multilayer wrap if indicated and wash leg with mild soap/water.  Applied moisturizing agent to dry skin as needed.   Applied primary and secondary dressing as ordered.  Applied multilayered dressing below the knee to left lower leg.  Instructed patient/caregiver not to remove dressing and to keep it clean and dry.   Instructed patient/caregiver on complications to report to provider, such as pain, numbness in toes, heavy drainage, and slippage of dressing.  Instructed patient on purpose of compression dressing and on activity and exercise recommendations.     Applied  2 layer wrap from toes to knee overlapping each time  Gentamicin, Triad, Optilock  Electronically signed by ISABELLE LAZO RN on 10/28/2024 at 3:43 PM

## 2024-10-28 NOTE — PATIENT INSTRUCTIONS
products -- please anticipate that call. If your out-of-pocket cost could be substantial, Many companies have financial hardship programs for patients who qualify, so please ask about that if you might need a hand. If you have any questions about your supplies or your potential out-of-pocket costs, or if you need to place an order for a refill of supplies (typically monthly), please call the company directly.     Your  is Angela    Follow up with Dr Carreno In 1 week(s) in the wound care center. Nurse visit Monday      Wound Care Center Information: Should you experience any significant changes in your wound(s) or have questions about your wound care, please contact the [facility] at [phone] Monday  - Thursday 8:00 am - 4:00 pm and Friday 8:00 am - 1:00pm. If you need help with your wound outside these hours and cannot wait until we are again available, contact your PCP or go to the hospital emergency room.     PLEASE NOTE: IF YOU ARE UNABLE TO OBTAIN WOUND SUPPLIES, CONTINUE TO USE THE SUPPLIES YOU HAVE AVAILABLE UNTIL YOU ARE ABLE TO REACH US. IT IS MOST IMPORTANT TO KEEP THE WOUND COVERED AT ALL TIMES.    Patient Experience    Thank you for trusting us with your care.  You may receive a survey from a company called [employer] asking for your feedback.  We would appreciate it if you took a few minutes to share your experience.  Your input is very valuable to us.

## 2024-10-31 NOTE — PATIENT INSTRUCTIONS
products -- please anticipate that call. If your out-of-pocket cost could be substantial, Many companies have financial hardship programs for patients who qualify, so please ask about that if you might need a hand. If you have any questions about your supplies or your potential out-of-pocket costs, or if you need to place an order for a refill of supplies (typically monthly), please call the company directly.     Your  is Angela    Follow up with Dr Carreno In 1 week(s) in the wound care center.    Wound Care Center Information: Should you experience any significant changes in your wound(s) or have questions about your wound care, please contact the [facility] at [phone] Monday  - Thursday 8:00 am - 4:00 pm and Friday 8:00 am - 1:00pm. If you need help with your wound outside these hours and cannot wait until we are again available, contact your PCP or go to the hospital emergency room.     PLEASE NOTE: IF YOU ARE UNABLE TO OBTAIN WOUND SUPPLIES, CONTINUE TO USE THE SUPPLIES YOU HAVE AVAILABLE UNTIL YOU ARE ABLE TO REACH US. IT IS MOST IMPORTANT TO KEEP THE WOUND COVERED AT ALL TIMES.    Patient Experience    Thank you for trusting us with your care.  You may receive a survey from a company called [employer] asking for your feedback.  We would appreciate it if you took a few minutes to share your experience.  Your input is very valuable to us.

## 2024-11-01 ENCOUNTER — HOSPITAL ENCOUNTER (OUTPATIENT)
Dept: WOUND CARE | Age: 49
Discharge: HOME | End: 2024-11-01
Attending: PODIATRIST
Payer: COMMERCIAL

## 2024-11-01 VITALS — DIASTOLIC BLOOD PRESSURE: 89 MMHG | HEART RATE: 99 BPM | SYSTOLIC BLOOD PRESSURE: 168 MMHG | TEMPERATURE: 97 F

## 2024-11-01 DIAGNOSIS — I89.0 LYMPHEDEMA: ICD-10-CM

## 2024-11-01 DIAGNOSIS — L97.223 NON-PRESSURE CHRONIC ULCER OF LEFT CALF WITH NECROSIS OF MUSCLE (HCC): ICD-10-CM

## 2024-11-01 DIAGNOSIS — I73.89 OTHER SPECIFIED PERIPHERAL VASCULAR DISEASES (HCC): Primary | ICD-10-CM

## 2024-11-01 DIAGNOSIS — R60.0 EDEMA LEG: ICD-10-CM

## 2024-11-01 PROCEDURE — 11043 DBRDMT MUSC&/FSCA 1ST 20/<: CPT

## 2024-11-01 PROCEDURE — 29581 APPL MULTLAYER CMPRN SYS LEG: CPT

## 2024-11-01 RX ORDER — NEOMYCIN/BACITRACIN/POLYMYXINB 3.5-400-5K
OINTMENT (GRAM) TOPICAL ONCE
OUTPATIENT
Start: 2024-11-01 | End: 2024-11-01

## 2024-11-01 RX ORDER — MUPIROCIN 20 MG/G
OINTMENT TOPICAL ONCE
OUTPATIENT
Start: 2024-11-01 | End: 2024-11-01

## 2024-11-01 RX ORDER — LIDOCAINE HYDROCHLORIDE 20 MG/ML
JELLY TOPICAL ONCE
OUTPATIENT
Start: 2024-11-01 | End: 2024-11-01

## 2024-11-01 RX ORDER — LIDOCAINE 40 MG/G
CREAM TOPICAL ONCE
Status: DISCONTINUED | OUTPATIENT
Start: 2024-11-01 | End: 2024-11-02 | Stop reason: HOSPADM

## 2024-11-01 RX ORDER — BETAMETHASONE DIPROPIONATE 0.5 MG/G
CREAM TOPICAL ONCE
OUTPATIENT
Start: 2024-11-01 | End: 2024-11-01

## 2024-11-01 RX ORDER — GENTAMICIN SULFATE 1 MG/G
OINTMENT TOPICAL ONCE
OUTPATIENT
Start: 2024-11-01 | End: 2024-11-01

## 2024-11-01 RX ORDER — SODIUM CHLOR/HYPOCHLOROUS ACID 0.033 %
SOLUTION, IRRIGATION IRRIGATION ONCE
OUTPATIENT
Start: 2024-11-01 | End: 2024-11-01

## 2024-11-01 RX ORDER — LIDOCAINE 50 MG/G
OINTMENT TOPICAL ONCE
OUTPATIENT
Start: 2024-11-01 | End: 2024-11-01

## 2024-11-01 RX ORDER — LIDOCAINE HYDROCHLORIDE 40 MG/ML
SOLUTION TOPICAL ONCE
OUTPATIENT
Start: 2024-11-01 | End: 2024-11-01

## 2024-11-01 RX ORDER — TRIAMCINOLONE ACETONIDE 1 MG/G
OINTMENT TOPICAL ONCE
OUTPATIENT
Start: 2024-11-01 | End: 2024-11-01

## 2024-11-01 RX ORDER — BACITRACIN ZINC AND POLYMYXIN B SULFATE 500; 1000 [USP'U]/G; [USP'U]/G
OINTMENT TOPICAL ONCE
OUTPATIENT
Start: 2024-11-01 | End: 2024-11-01

## 2024-11-01 RX ORDER — CLOBETASOL PROPIONATE 0.5 MG/G
OINTMENT TOPICAL ONCE
OUTPATIENT
Start: 2024-11-01 | End: 2024-11-01

## 2024-11-01 RX ORDER — LIDOCAINE 40 MG/G
CREAM TOPICAL ONCE
OUTPATIENT
Start: 2024-11-01 | End: 2024-11-01

## 2024-11-01 RX ORDER — GINSENG 100 MG
CAPSULE ORAL ONCE
OUTPATIENT
Start: 2024-11-01 | End: 2024-11-01

## 2024-11-01 RX ORDER — SILVER SULFADIAZINE 10 MG/G
CREAM TOPICAL ONCE
OUTPATIENT
Start: 2024-11-01 | End: 2024-11-01

## 2024-11-01 NOTE — PROGRESS NOTES
Multilayer Compression Wrap   Below the Knee    NAME:  Fani Mar  YOB: 1975  MEDICAL RECORD NUMBER:  [id number]  DATE:  11/1/2024    Multilayer compression wrap: Removed old Multilayer wrap if indicated and wash leg with mild soap/water.  Applied moisturizing agent to dry skin as needed.   Applied primary and secondary dressing as ordered.  Applied multilayered dressing below the knee to left lower leg.  Instructed patient/caregiver not to remove dressing and to keep it clean and dry.   Instructed patient/caregiver on complications to report to provider, such as pain, numbness in toes, heavy drainage, and slippage of dressing.  Instructed patient on purpose of compression dressing and on activity and exercise recommendations.     Applied  2 layer wrap from toes to knee overlapping each time    Electronically signed by Georgie Amador RN on 11/1/2024 at 12:19 PM

## 2024-11-01 NOTE — PLAN OF CARE
Discharge instructions given.  Patient verbalized understanding.  Return to [facility] in 1 week(s).

## 2024-11-01 NOTE — PROGRESS NOTES
[facility]  Progress Note and Procedure Note      Fani Mar  AGE: 49 y.o.   GENDER: female  : 1975  TODAY'S DATE:  2024    Subjective:     Chief Complaint   Patient presents with    Wound Check     Left Leg F/U         HISTORY of PRESENT ILLNESS HPI     Fani Mar is a 49 y.o. female who presents today for wound evaluation.   History of Wound: Patient elects to having wound for at least 4 weeks on the back of her leg.  She states it is a venous stasis ulceration.  She admits to significant drainage and odor.  Wound Pain:  intermittent  Severity:  3 / 10   Wound Type:  venous  Modifying Factors:  edema, venous stasis, and lymphedema  Associated Signs/Symptoms:  edema, erythema, drainage, and odor        PAST MEDICAL HISTORY        Diagnosis Date    Anxiety     Depression     Hypertension     PCOS (polycystic ovarian syndrome)        PAST SURGICAL HISTORY    Past Surgical History:   Procedure Laterality Date    APPENDECTOMY      TONSILLECTOMY AND ADENOIDECTOMY      TYMPANOSTOMY TUBE PLACEMENT         FAMILY HISTORY    Family History   Problem Relation Age of Onset    Diabetes Mother     Dementia Mother     High Blood Pressure Father     Pancreatic Cancer Father     Cancer Father         Pancreatic    Coronary Art Dis Sister         Congestive heart failure with pulmonary hypertension    Pancreatic Cancer Sister         PULM HTN    Cancer Brother 34        Brain tumor       SOCIAL HISTORY    Social History     Tobacco Use    Smoking status: Never     Passive exposure: Never    Smokeless tobacco: Never   Substance Use Topics    Alcohol use: Yes     Comment: 1 glass on holidays/spec. occassions, avg < 1/month    Drug use: Never       ALLERGIES    No Known Allergies    MEDICATIONS    Current Outpatient Medications on File Prior to Encounter   Medication Sig Dispense Refill    ciprofloxacin (CIPRO) 500 MG tablet Take 1 tablet by mouth 2 times daily for 10 days 20 tablet 0

## 2024-11-07 NOTE — PATIENT INSTRUCTIONS
[facility]  [address]   [place], [place] [address]  Telephone: [phone]     FAX [phone]    Discharge Instructions    Important reminders:    **If you have any signs and symptoms of illness (Cough, fever, congestion, nausea, vomiting, diarrhea, etc.) please call the wound care center prior to your appointment.    1. Increase Protein intake for optimal wound healing  2. No added salt to reduce any swelling  3. If diabetic, maintain good glucose control  4. If you smoke, smoking prohibits wound healing, we ask that you refrain from smoking.  5. When taking antibiotics take the entire prescription as ordered. Do not stop taking until medication is all gone unless otherwise instructed.   6. Exercise as tolerated.   7. Keep weight off wounds and reposition every 2 hours if applicable.  8. If wound(s) is on your lower extremity, elevate legs to the level of the heart or above for 30 minutes 4-5 times a day and/or when sitting. Avoid standing for long periods of time.   9. Do not get wounds wet in bath or shower unless otherwise instructed by your physician. If your wound is on your foot or leg, you may purchase a cast bag. Please ask at the pharmacy.      If Vascular testing is ordered, please call [phone] to schedule.    Vascular tests ordered by Wound Care Physicians may take up to 2 hours to complete. Please keep that in mind when scheduling.     If Vascular testing is scheduled, please bring supplies to replace your dressing after testing is done. The vascular department does not stock supplies.     Wound: Left leg     With each dressing change, rinse wounds with 0.9% Saline. (May use wound wash or soft contact solution. Both can be purchased at a local drug store). If unable to obtain saline, may use a gentle soap and water.    Dressing care:Look at [facility] for a new recliner. Take diuretic as ordered. Do not get leg wet in shower. Can get a cast back. Triad (40%) and

## 2024-11-08 ENCOUNTER — HOSPITAL ENCOUNTER (OUTPATIENT)
Dept: WOUND CARE | Age: 49
Discharge: HOME | End: 2024-11-08
Attending: PODIATRIST
Payer: COMMERCIAL

## 2024-11-08 DIAGNOSIS — L97.223 NON-PRESSURE CHRONIC ULCER OF LEFT CALF WITH NECROSIS OF MUSCLE (HCC): ICD-10-CM

## 2024-11-08 DIAGNOSIS — R60.0 EDEMA LEG: ICD-10-CM

## 2024-11-08 DIAGNOSIS — I73.89 OTHER SPECIFIED PERIPHERAL VASCULAR DISEASES (HCC): Primary | ICD-10-CM

## 2024-11-08 DIAGNOSIS — I89.0 LYMPHEDEMA: ICD-10-CM

## 2024-11-08 PROCEDURE — 29581 APPL MULTLAYER CMPRN SYS LEG: CPT

## 2024-11-08 PROCEDURE — 11043 DBRDMT MUSC&/FSCA 1ST 20/<: CPT

## 2024-11-08 RX ORDER — SODIUM CHLOR/HYPOCHLOROUS ACID 0.033 %
SOLUTION, IRRIGATION IRRIGATION ONCE
OUTPATIENT
Start: 2024-11-08 | End: 2024-11-08

## 2024-11-08 RX ORDER — LIDOCAINE HYDROCHLORIDE 20 MG/ML
JELLY TOPICAL ONCE
OUTPATIENT
Start: 2024-11-08 | End: 2024-11-08

## 2024-11-08 RX ORDER — LIDOCAINE 40 MG/G
CREAM TOPICAL ONCE
Status: DISCONTINUED | OUTPATIENT
Start: 2024-11-08 | End: 2024-11-09 | Stop reason: HOSPADM

## 2024-11-08 RX ORDER — GINSENG 100 MG
CAPSULE ORAL ONCE
OUTPATIENT
Start: 2024-11-08 | End: 2024-11-08

## 2024-11-08 RX ORDER — MUPIROCIN 20 MG/G
OINTMENT TOPICAL ONCE
OUTPATIENT
Start: 2024-11-08 | End: 2024-11-08

## 2024-11-08 RX ORDER — NEOMYCIN/BACITRACIN/POLYMYXINB 3.5-400-5K
OINTMENT (GRAM) TOPICAL ONCE
OUTPATIENT
Start: 2024-11-08 | End: 2024-11-08

## 2024-11-08 RX ORDER — BACITRACIN ZINC AND POLYMYXIN B SULFATE 500; 1000 [USP'U]/G; [USP'U]/G
OINTMENT TOPICAL ONCE
OUTPATIENT
Start: 2024-11-08 | End: 2024-11-08

## 2024-11-08 RX ORDER — GENTAMICIN SULFATE 1 MG/G
OINTMENT TOPICAL ONCE
OUTPATIENT
Start: 2024-11-08 | End: 2024-11-08

## 2024-11-08 RX ORDER — LIDOCAINE HYDROCHLORIDE 40 MG/ML
SOLUTION TOPICAL ONCE
OUTPATIENT
Start: 2024-11-08 | End: 2024-11-08

## 2024-11-08 RX ORDER — CLOBETASOL PROPIONATE 0.5 MG/G
OINTMENT TOPICAL ONCE
OUTPATIENT
Start: 2024-11-08 | End: 2024-11-08

## 2024-11-08 RX ORDER — LIDOCAINE 40 MG/G
CREAM TOPICAL ONCE
OUTPATIENT
Start: 2024-11-08 | End: 2024-11-08

## 2024-11-08 RX ORDER — SILVER SULFADIAZINE 10 MG/G
CREAM TOPICAL ONCE
OUTPATIENT
Start: 2024-11-08 | End: 2024-11-08

## 2024-11-08 RX ORDER — LIDOCAINE 50 MG/G
OINTMENT TOPICAL ONCE
OUTPATIENT
Start: 2024-11-08 | End: 2024-11-08

## 2024-11-08 RX ORDER — BETAMETHASONE DIPROPIONATE 0.5 MG/G
CREAM TOPICAL ONCE
OUTPATIENT
Start: 2024-11-08 | End: 2024-11-08

## 2024-11-08 RX ORDER — TRIAMCINOLONE ACETONIDE 1 MG/G
OINTMENT TOPICAL ONCE
OUTPATIENT
Start: 2024-11-08 | End: 2024-11-08

## 2024-11-08 NOTE — PROGRESS NOTES
[facility]  Progress Note and Procedure Note      Fani Mar  AGE: 49 y.o.   GENDER: female  : 1975  TODAY'S DATE:  2024    Subjective:     Chief Complaint   Patient presents with    Wound Check     Follow up         HISTORY of PRESENT ILLNESS HPI     Fani Mar is a 49 y.o. female who presents today for wound evaluation.   History of Wound: Patient elects to having wound for at least 4 weeks on the back of her leg.  She states it is a venous stasis ulceration.    She is left the dressing on as directed.  She admits that she is not elevating her legs during the day at all because she has no way to.  Wound Pain:  intermittent  Severity:  3  10   Wound Type:  venous  Modifying Factors:  edema, venous stasis, and lymphedema  Associated Signs/Symptoms:  edema, erythema, drainage, and odor        PAST MEDICAL HISTORY        Diagnosis Date    Anxiety     Depression     Hypertension     PCOS (polycystic ovarian syndrome)        PAST SURGICAL HISTORY    Past Surgical History:   Procedure Laterality Date    APPENDECTOMY      TONSILLECTOMY AND ADENOIDECTOMY      TYMPANOSTOMY TUBE PLACEMENT         FAMILY HISTORY    Family History   Problem Relation Age of Onset    Diabetes Mother     Dementia Mother     High Blood Pressure Father     Pancreatic Cancer Father     Cancer Father         Pancreatic    Coronary Art Dis Sister         Congestive heart failure with pulmonary hypertension    Pancreatic Cancer Sister         PULM HTN    Cancer Brother 34        Brain tumor       SOCIAL HISTORY    Social History     Tobacco Use    Smoking status: Never     Passive exposure: Never    Smokeless tobacco: Never   Substance Use Topics    Alcohol use: Yes     Comment: 1 glass on holidays/spec. occassions, avg < 1/month    Drug use: Never       ALLERGIES    No Known Allergies    MEDICATIONS    Current Outpatient Medications on File Prior to Encounter   Medication Sig Dispense Refill

## 2024-11-08 NOTE — PLAN OF CARE
Discharge instructions given.  Patient verbalized understanding.  Return to [facility] in 1 week(s).

## 2024-11-08 NOTE — PROGRESS NOTES
Multilayer Compression Wrap   Below the Knee    NAME:  Fani Mar  YOB: 1975  MEDICAL RECORD NUMBER:  [id number]  DATE:  11/8/2024    Multilayer compression wrap: Applied primary and secondary dressing as ordered.  Applied multilayered dressing below the knee to left lower leg.  Instructed patient/caregiver not to remove dressing and to keep it clean and dry.   Instructed patient/caregiver on complications to report to provider, such as pain, numbness in toes, heavy drainage, and slippage of dressing.  Instructed patient on purpose of compression dressing and on activity and exercise recommendations.     Applied  2 layer wrap from toes to knee overlapping each time    Electronically signed by GILL JIMENEZ RN on 11/8/2024 at 9:33 AM

## 2024-11-14 NOTE — PATIENT INSTRUCTIONS
[facility]  [address]   [place], [place] [address]  Telephone: [phone]     FAX [phone]    Discharge Instructions    Important reminders:    **If you have any signs and symptoms of illness (Cough, fever, congestion, nausea, vomiting, diarrhea, etc.) please call the wound care center prior to your appointment.    1. Increase Protein intake for optimal wound healing  2. No added salt to reduce any swelling  3. If diabetic, maintain good glucose control  4. If you smoke, smoking prohibits wound healing, we ask that you refrain from smoking.  5. When taking antibiotics take the entire prescription as ordered. Do not stop taking until medication is all gone unless otherwise instructed.   6. Exercise as tolerated.   7. Keep weight off wounds and reposition every 2 hours if applicable.  8. If wound(s) is on your lower extremity, elevate legs to the level of the heart or above for 30 minutes 4-5 times a day and/or when sitting. Avoid standing for long periods of time.   9. Do not get wounds wet in bath or shower unless otherwise instructed by your physician. If your wound is on your foot or leg, you may purchase a cast bag. Please ask at the pharmacy.      If Vascular testing is ordered, please call [phone] to schedule.    Vascular tests ordered by Wound Care Physicians may take up to 2 hours to complete. Please keep that in mind when scheduling.     If Vascular testing is scheduled, please bring supplies to replace your dressing after testing is done. The vascular department does not stock supplies.     Wound: Left leg     With each dressing change, rinse wounds with 0.9% Saline. (May use wound wash or soft contact solution. Both can be purchased at a local drug store). If unable to obtain saline, may use a gentle soap and water.    Dressing care:Look at [facility] for a new recliner. Take diuretic as ordered. Do not get leg wet in shower. Can get a cast back. Triad (40%) and

## 2024-11-15 ENCOUNTER — HOSPITAL ENCOUNTER (OUTPATIENT)
Dept: WOUND CARE | Age: 49
Discharge: HOME | End: 2024-11-15
Attending: PODIATRIST
Payer: COMMERCIAL

## 2024-11-15 VITALS
RESPIRATION RATE: 20 BRPM | DIASTOLIC BLOOD PRESSURE: 88 MMHG | HEART RATE: 93 BPM | SYSTOLIC BLOOD PRESSURE: 154 MMHG | TEMPERATURE: 96.8 F

## 2024-11-15 DIAGNOSIS — I73.89 OTHER SPECIFIED PERIPHERAL VASCULAR DISEASES (HCC): Primary | ICD-10-CM

## 2024-11-15 DIAGNOSIS — R60.0 EDEMA LEG: ICD-10-CM

## 2024-11-15 DIAGNOSIS — I89.0 LYMPHEDEMA: ICD-10-CM

## 2024-11-15 DIAGNOSIS — L97.223 NON-PRESSURE CHRONIC ULCER OF LEFT CALF WITH NECROSIS OF MUSCLE (HCC): ICD-10-CM

## 2024-11-15 PROCEDURE — 29581 APPL MULTLAYER CMPRN SYS LEG: CPT

## 2024-11-15 PROCEDURE — 11043 DBRDMT MUSC&/FSCA 1ST 20/<: CPT

## 2024-11-15 RX ORDER — BACITRACIN ZINC AND POLYMYXIN B SULFATE 500; 1000 [USP'U]/G; [USP'U]/G
OINTMENT TOPICAL ONCE
OUTPATIENT
Start: 2024-11-15 | End: 2024-11-15

## 2024-11-15 RX ORDER — LIDOCAINE HYDROCHLORIDE 20 MG/ML
JELLY TOPICAL ONCE
OUTPATIENT
Start: 2024-11-15 | End: 2024-11-15

## 2024-11-15 RX ORDER — TRIAMCINOLONE ACETONIDE 1 MG/G
OINTMENT TOPICAL ONCE
OUTPATIENT
Start: 2024-11-15 | End: 2024-11-15

## 2024-11-15 RX ORDER — GINSENG 100 MG
CAPSULE ORAL ONCE
OUTPATIENT
Start: 2024-11-15 | End: 2024-11-15

## 2024-11-15 RX ORDER — LIDOCAINE 50 MG/G
OINTMENT TOPICAL ONCE
OUTPATIENT
Start: 2024-11-15 | End: 2024-11-15

## 2024-11-15 RX ORDER — GENTAMICIN SULFATE 1 MG/G
OINTMENT TOPICAL ONCE
OUTPATIENT
Start: 2024-11-15 | End: 2024-11-15

## 2024-11-15 RX ORDER — CLOBETASOL PROPIONATE 0.5 MG/G
OINTMENT TOPICAL ONCE
OUTPATIENT
Start: 2024-11-15 | End: 2024-11-15

## 2024-11-15 RX ORDER — SODIUM CHLOR/HYPOCHLOROUS ACID 0.033 %
SOLUTION, IRRIGATION IRRIGATION ONCE
OUTPATIENT
Start: 2024-11-15 | End: 2024-11-15

## 2024-11-15 RX ORDER — SILVER SULFADIAZINE 10 MG/G
CREAM TOPICAL ONCE
OUTPATIENT
Start: 2024-11-15 | End: 2024-11-15

## 2024-11-15 RX ORDER — LIDOCAINE 40 MG/G
CREAM TOPICAL ONCE
Status: DISCONTINUED | OUTPATIENT
Start: 2024-11-15 | End: 2024-11-16 | Stop reason: HOSPADM

## 2024-11-15 RX ORDER — NEOMYCIN/BACITRACIN/POLYMYXINB 3.5-400-5K
OINTMENT (GRAM) TOPICAL ONCE
OUTPATIENT
Start: 2024-11-15 | End: 2024-11-15

## 2024-11-15 RX ORDER — LIDOCAINE 40 MG/G
CREAM TOPICAL ONCE
OUTPATIENT
Start: 2024-11-15 | End: 2024-11-15

## 2024-11-15 RX ORDER — LIDOCAINE HYDROCHLORIDE 40 MG/ML
SOLUTION TOPICAL ONCE
OUTPATIENT
Start: 2024-11-15 | End: 2024-11-15

## 2024-11-15 RX ORDER — MUPIROCIN 20 MG/G
OINTMENT TOPICAL ONCE
OUTPATIENT
Start: 2024-11-15 | End: 2024-11-15

## 2024-11-15 RX ORDER — BETAMETHASONE DIPROPIONATE 0.5 MG/G
CREAM TOPICAL ONCE
OUTPATIENT
Start: 2024-11-15 | End: 2024-11-15

## 2024-11-15 NOTE — PROGRESS NOTES
TAKE 1 TABLET BY MOUTH 3 TIMES A DAY WITH A MEAL 90 tablet 1    vilazodone HCl (VIIBRYD) 20 MG TABS 1 TABLET BY MOUTH DAILY 90 tablet 1    furosemide (LASIX) 20 MG tablet 0.5 TAB AS DAILY AS NEEDED FOR DEPENDENT EDEMA 60 tablet 1    lisinopril-hydroCHLOROthiazide (PRINZIDE;ZESTORETIC) 10-12.5 MG per tablet Take 1 tablet by mouth daily 90 tablet 3     No current facility-administered medications on file prior to encounter.       REVIEW OF SYSTEMS    Pertinent items are noted in HPI.      Objective:      BP (!) 154/88   Pulse 93   Temp 96.8 °F (36 °C) (Infrared)   Resp 20     PHYSICAL EXAM    Vascular: Vascular status Intact  palpable pedal pulses, right DP2/4 and PT2/4, left DP2/4 and PT2/4.  CFT 2 seconds digits 1 to 5 bilateral.  Hair growthAbsent  both lower extremities and feet.  Skin temperature is warm to warm from pretibial area to distal digits bilateral.  Exam is negative for rubor, pallor, cyanosis or signs of acute vascular compromise bilaterally.  Exam is positive for edema bilateral lower extremity.  Varicosities Present bilateral lower extremity.   Neuro: Neurologic status normal bilateral with epicritic Present , proprioceptive Present, vibratory sensationPresent and protopathicPresent.  DTR’s Present bilateral Achilles. There were no reproducible neuritic symptoms on exam bilateral feet/ankles.  Derm: Ulceration to left calf.  Ecchymosis Absent  bilateral feet/foot.    Musculoskeletal: Mild pain with wound debridement 5/5 muscle strength in/eversion and dorsi/plantarflexion bilateral feet.  No gross instability noted.         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

## 2024-11-15 NOTE — PLAN OF CARE
Discharge instructions given.  Patient verbalized understanding.  Return to [facility] in 1 week(s).

## 2024-11-21 NOTE — PATIENT INSTRUCTIONS
order and discuss those potential costs before they ship your products -- please anticipate that call. If your out-of-pocket cost could be substantial, Many companies have financial hardship programs for patients who qualify, so please ask about that if you might need a hand. If you have any questions about your supplies or your potential out-of-pocket costs, or if you need to place an order for a refill of supplies (typically monthly), please call the company directly.     Your  is Angela    Follow up with Dr Carreno In 2 week(s) in the wound care center. Nurse visit Monday 12/2    Wound Care Center Information: Should you experience any significant changes in your wound(s) or have questions about your wound care, please contact the [facility] at [phone] Monday  - Thursday 8:00 am - 4:00 pm and Friday 8:00 am - 1:00pm. If you need help with your wound outside these hours and cannot wait until we are again available, contact your PCP or go to the hospital emergency room.     PLEASE NOTE: IF YOU ARE UNABLE TO OBTAIN WOUND SUPPLIES, CONTINUE TO USE THE SUPPLIES YOU HAVE AVAILABLE UNTIL YOU ARE ABLE TO REACH US. IT IS MOST IMPORTANT TO KEEP THE WOUND COVERED AT ALL TIMES.    Patient Experience    Thank you for trusting us with your care.  You may receive a survey from a company called [employer] asking for your feedback.  We would appreciate it if you took a few minutes to share your experience.  Your input is very valuable to us.

## 2024-11-22 ENCOUNTER — HOSPITAL ENCOUNTER (OUTPATIENT)
Dept: WOUND CARE | Age: 49
Discharge: HOME | End: 2024-11-22
Attending: PODIATRIST
Payer: COMMERCIAL

## 2024-11-22 VITALS
DIASTOLIC BLOOD PRESSURE: 93 MMHG | HEART RATE: 102 BPM | TEMPERATURE: 96.9 F | SYSTOLIC BLOOD PRESSURE: 156 MMHG | RESPIRATION RATE: 18 BRPM

## 2024-11-22 DIAGNOSIS — I73.89 OTHER SPECIFIED PERIPHERAL VASCULAR DISEASES (HCC): Primary | ICD-10-CM

## 2024-11-22 DIAGNOSIS — I89.0 LYMPHEDEMA: ICD-10-CM

## 2024-11-22 DIAGNOSIS — R60.0 EDEMA LEG: ICD-10-CM

## 2024-11-22 DIAGNOSIS — L97.223 NON-PRESSURE CHRONIC ULCER OF LEFT CALF WITH NECROSIS OF MUSCLE (HCC): ICD-10-CM

## 2024-11-22 PROCEDURE — 29581 APPL MULTLAYER CMPRN SYS LEG: CPT

## 2024-11-22 PROCEDURE — 11043 DBRDMT MUSC&/FSCA 1ST 20/<: CPT

## 2024-11-22 RX ORDER — SODIUM CHLOR/HYPOCHLOROUS ACID 0.033 %
SOLUTION, IRRIGATION IRRIGATION ONCE
OUTPATIENT
Start: 2024-11-22 | End: 2024-11-22

## 2024-11-22 RX ORDER — BACITRACIN ZINC AND POLYMYXIN B SULFATE 500; 1000 [USP'U]/G; [USP'U]/G
OINTMENT TOPICAL ONCE
OUTPATIENT
Start: 2024-11-22 | End: 2024-11-22

## 2024-11-22 RX ORDER — LIDOCAINE HYDROCHLORIDE 20 MG/ML
JELLY TOPICAL ONCE
OUTPATIENT
Start: 2024-11-22 | End: 2024-11-22

## 2024-11-22 RX ORDER — GENTAMICIN SULFATE 1 MG/G
OINTMENT TOPICAL ONCE
OUTPATIENT
Start: 2024-11-22 | End: 2024-11-22

## 2024-11-22 RX ORDER — LIDOCAINE HYDROCHLORIDE 40 MG/ML
SOLUTION TOPICAL ONCE
OUTPATIENT
Start: 2024-11-22 | End: 2024-11-22

## 2024-11-22 RX ORDER — NEOMYCIN/BACITRACIN/POLYMYXINB 3.5-400-5K
OINTMENT (GRAM) TOPICAL ONCE
OUTPATIENT
Start: 2024-11-22 | End: 2024-11-22

## 2024-11-22 RX ORDER — CLOTRIMAZOLE AND BETAMETHASONE DIPROPIONATE 10; .64 MG/G; MG/G
CREAM TOPICAL
Qty: 45 G | Refills: 0 | Status: SHIPPED | OUTPATIENT
Start: 2024-11-22

## 2024-11-22 RX ORDER — TRIAMCINOLONE ACETONIDE 1 MG/G
OINTMENT TOPICAL ONCE
OUTPATIENT
Start: 2024-11-22 | End: 2024-11-22

## 2024-11-22 RX ORDER — LIDOCAINE 50 MG/G
OINTMENT TOPICAL ONCE
OUTPATIENT
Start: 2024-11-22 | End: 2024-11-22

## 2024-11-22 RX ORDER — BETAMETHASONE DIPROPIONATE 0.5 MG/G
CREAM TOPICAL ONCE
OUTPATIENT
Start: 2024-11-22 | End: 2024-11-22

## 2024-11-22 RX ORDER — CLOBETASOL PROPIONATE 0.5 MG/G
OINTMENT TOPICAL ONCE
OUTPATIENT
Start: 2024-11-22 | End: 2024-11-22

## 2024-11-22 RX ORDER — SILVER SULFADIAZINE 10 MG/G
CREAM TOPICAL ONCE
OUTPATIENT
Start: 2024-11-22 | End: 2024-11-22

## 2024-11-22 RX ORDER — LIDOCAINE 40 MG/G
CREAM TOPICAL ONCE
Status: DISCONTINUED | OUTPATIENT
Start: 2024-11-22 | End: 2024-11-23 | Stop reason: HOSPADM

## 2024-11-22 RX ORDER — LIDOCAINE 40 MG/G
CREAM TOPICAL ONCE
OUTPATIENT
Start: 2024-11-22 | End: 2024-11-22

## 2024-11-22 RX ORDER — MUPIROCIN 20 MG/G
OINTMENT TOPICAL ONCE
OUTPATIENT
Start: 2024-11-22 | End: 2024-11-22

## 2024-11-22 RX ORDER — GINSENG 100 MG
CAPSULE ORAL ONCE
OUTPATIENT
Start: 2024-11-22 | End: 2024-11-22

## 2024-11-22 ASSESSMENT — PAIN DESCRIPTION - LOCATION: LOCATION: LEG

## 2024-11-22 ASSESSMENT — PAIN DESCRIPTION - ORIENTATION: ORIENTATION: LEFT

## 2024-11-22 ASSESSMENT — PAIN SCALES - GENERAL: PAINLEVEL_OUTOF10: 3

## 2024-11-22 ASSESSMENT — PAIN DESCRIPTION - PAIN TYPE: TYPE: CHRONIC PAIN

## 2024-11-22 ASSESSMENT — PAIN DESCRIPTION - DESCRIPTORS: DESCRIPTORS: TENDER

## 2024-11-22 NOTE — PLAN OF CARE
Discharge instructions given.  Patient verbalized understanding.  Return to [facility] in 1 week(s).

## 2024-11-22 NOTE — PROGRESS NOTES
Multilayer Compression Wrap   Below the Knee    NAME:  Fani Mar  YOB: 1975  MEDICAL RECORD NUMBER:  [id number]  DATE:  11/22/2024    Multilayer compression wrap: Removed old Multilayer wrap if indicated and wash leg with mild soap/water.  Applied moisturizing agent to dry skin as needed.   Applied primary and secondary dressing as ordered.  Applied multilayered dressing below the knee to left lower leg.  Instructed patient/caregiver not to remove dressing and to keep it clean and dry.   Instructed patient/caregiver on complications to report to provider, such as pain, numbness in toes, heavy drainage, and slippage of dressing.  Instructed patient on purpose of compression dressing and on activity and exercise recommendations.     Applied  2 layer wrap from toes to knee overlapping each time  Applied Gentamicin and Triad , Optilock  Electronically signed by ISABELLE LAZO RN on 11/22/2024 at 9:20 AM        
Relevant Orders    Initiate Outpatient Wound Care Protocol    Edema leg    Relevant Medications    lidocaine (LMX) 4 % cream    Other Relevant Orders    Initiate Outpatient Wound Care Protocol    Other specified peripheral vascular diseases (HCC) - Primary    Relevant Medications    lidocaine (LMX) 4 % cream    Other Relevant Orders    Initiate Outpatient Wound Care Protocol       Procedure Note    Performed by: Siddhartha Carreno DPM    Consent obtained: Yes    Time out taken:  Yes    Pain Control: Anesthetic  Anesthetic: 4% Lidocaine Cream     Debridement:Excisional Debridement    Using curette the wound was sharply debrided    down through and including the removal of epidermis, dermis, subcutaneous tissue, and muscle/fascia.        Devitalized Tissue Debrided:  fibrin, biofilm, slough, necrotic/eschar, and exudate    Pre Debridement Measurements:  Are located in the Wound Documentation Flow Sheet    Wound #: 1     Wound Care Documentation:  Wound 10/25/24 Leg Left;Posterior;Lower;Lateral #1 (Active)   Wound Image   11/22/24 0852   Wound Etiology Venous 11/22/24 0838   Wound Cleansed Wound cleanser 11/22/24 0838   Wound Length (cm) 2 cm 11/22/24 0838   Wound Width (cm) 2.5 cm 11/22/24 0838   Wound Depth (cm) 0.5 cm 11/22/24 0838   Wound Surface Area (cm^2) 5 cm^2 11/22/24 0838   Change in Wound Size % (l*w) 10.71 11/22/24 0838   Wound Volume (cm^3) 2.5 cm^3 11/22/24 0838   Wound Healing % -346 11/22/24 0838   Post-Procedure Length (cm) 2 cm 11/22/24 0852   Post-Procedure Width (cm) 2.5 cm 11/22/24 0852   Post-Procedure Depth (cm) 0.5 cm 11/22/24 0852   Post-Procedure Surface Area (cm^2) 5 cm^2 11/22/24 0852   Post-Procedure Volume (cm^3) 2.5 cm^3 11/22/24 0852   Wound Assessment Bleeding 11/22/24 0852   Drainage Amount Moderate (25-50%) 11/22/24 0838   Drainage Description Serosanguinous 11/22/24 0838   Odor None 11/22/24 0838   Maria De Jesus-wound Assessment Blanchable erythema;Dry/flaky 11/22/24 0838   Margins Attached

## 2024-12-02 ENCOUNTER — HOSPITAL ENCOUNTER (OUTPATIENT)
Dept: WOUND CARE | Age: 49
Discharge: HOME | End: 2024-12-02
Attending: PODIATRIST
Payer: COMMERCIAL

## 2024-12-02 DIAGNOSIS — I89.0 LYMPHEDEMA: ICD-10-CM

## 2024-12-02 DIAGNOSIS — R60.0 EDEMA LEG: ICD-10-CM

## 2024-12-02 DIAGNOSIS — I73.89 OTHER SPECIFIED PERIPHERAL VASCULAR DISEASES (HCC): Primary | ICD-10-CM

## 2024-12-02 DIAGNOSIS — L97.223 NON-PRESSURE CHRONIC ULCER OF LEFT CALF WITH NECROSIS OF MUSCLE (HCC): ICD-10-CM

## 2024-12-02 PROCEDURE — 29581 APPL MULTLAYER CMPRN SYS LEG: CPT

## 2024-12-02 RX ORDER — SILVER SULFADIAZINE 10 MG/G
CREAM TOPICAL ONCE
OUTPATIENT
Start: 2024-12-02 | End: 2024-12-02

## 2024-12-02 RX ORDER — LIDOCAINE 40 MG/G
CREAM TOPICAL ONCE
Status: CANCELLED | OUTPATIENT
Start: 2024-12-02 | End: 2024-12-02

## 2024-12-02 RX ORDER — LIDOCAINE HYDROCHLORIDE 20 MG/ML
JELLY TOPICAL ONCE
OUTPATIENT
Start: 2024-12-02 | End: 2024-12-02

## 2024-12-02 RX ORDER — NEOMYCIN/BACITRACIN/POLYMYXINB 3.5-400-5K
OINTMENT (GRAM) TOPICAL ONCE
OUTPATIENT
Start: 2024-12-02 | End: 2024-12-02

## 2024-12-02 RX ORDER — LIDOCAINE 40 MG/G
CREAM TOPICAL ONCE
Status: DISCONTINUED | OUTPATIENT
Start: 2024-12-02 | End: 2024-12-03 | Stop reason: HOSPADM

## 2024-12-02 RX ORDER — LIDOCAINE 50 MG/G
OINTMENT TOPICAL ONCE
OUTPATIENT
Start: 2024-12-02 | End: 2024-12-02

## 2024-12-02 RX ORDER — TRIAMCINOLONE ACETONIDE 1 MG/G
OINTMENT TOPICAL ONCE
OUTPATIENT
Start: 2024-12-02 | End: 2024-12-02

## 2024-12-02 RX ORDER — CLOBETASOL PROPIONATE 0.5 MG/G
OINTMENT TOPICAL ONCE
OUTPATIENT
Start: 2024-12-02 | End: 2024-12-02

## 2024-12-02 RX ORDER — SODIUM CHLOR/HYPOCHLOROUS ACID 0.033 %
SOLUTION, IRRIGATION IRRIGATION ONCE
OUTPATIENT
Start: 2024-12-02 | End: 2024-12-02

## 2024-12-02 RX ORDER — LIDOCAINE HYDROCHLORIDE 40 MG/ML
SOLUTION TOPICAL ONCE
OUTPATIENT
Start: 2024-12-02 | End: 2024-12-02

## 2024-12-02 RX ORDER — BACITRACIN ZINC AND POLYMYXIN B SULFATE 500; 1000 [USP'U]/G; [USP'U]/G
OINTMENT TOPICAL ONCE
OUTPATIENT
Start: 2024-12-02 | End: 2024-12-02

## 2024-12-02 RX ORDER — MUPIROCIN 20 MG/G
OINTMENT TOPICAL ONCE
OUTPATIENT
Start: 2024-12-02 | End: 2024-12-02

## 2024-12-02 RX ORDER — BETAMETHASONE DIPROPIONATE 0.5 MG/G
CREAM TOPICAL ONCE
OUTPATIENT
Start: 2024-12-02 | End: 2024-12-02

## 2024-12-02 RX ORDER — GINSENG 100 MG
CAPSULE ORAL ONCE
OUTPATIENT
Start: 2024-12-02 | End: 2024-12-02

## 2024-12-02 RX ORDER — GENTAMICIN SULFATE 1 MG/G
OINTMENT TOPICAL ONCE
OUTPATIENT
Start: 2024-12-02 | End: 2024-12-02

## 2024-12-02 NOTE — PATIENT INSTRUCTIONS
[facility]  [address]   [place], [place] [address]  Telephone: [phone]     FAX [phone]    Discharge Instructions    Important reminders:    **If you have any signs and symptoms of illness (Cough, fever, congestion, nausea, vomiting, diarrhea, etc.) please call the wound care center prior to your appointment.    1. Increase Protein intake for optimal wound healing  2. No added salt to reduce any swelling  3. If diabetic, maintain good glucose control  4. If you smoke, smoking prohibits wound healing, we ask that you refrain from smoking.  5. When taking antibiotics take the entire prescription as ordered. Do not stop taking until medication is all gone unless otherwise instructed.   6. Exercise as tolerated.   7. Keep weight off wounds and reposition every 2 hours if applicable.  8. If wound(s) is on your lower extremity, elevate legs to the level of the heart or above for 30 minutes 4-5 times a day and/or when sitting. Avoid standing for long periods of time.   9. Do not get wounds wet in bath or shower unless otherwise instructed by your physician. If your wound is on your foot or leg, you may purchase a cast bag. Please ask at the pharmacy.      If Vascular testing is ordered, please call [phone] to schedule.    Vascular tests ordered by Wound Care Physicians may take up to 2 hours to complete. Please keep that in mind when scheduling.     If Vascular testing is scheduled, please bring supplies to replace your dressing after testing is done. The vascular department does not stock supplies.     Wound: Left leg     With each dressing change, rinse wounds with 0.9% Saline. (May use wound wash or soft contact solution. Both can be purchased at a local drug store). If unable to obtain saline, may use a gentle soap and water.    Dressing care: Look at [facility] for a new recliner. Take diuretic as ordered. Do not get leg wet in shower. Can get a cast back. Triad (40%) and

## 2024-12-02 NOTE — PROGRESS NOTES
Multilayer Compression Wrap   Below the Knee    NAME:  Fani Mar  YOB: 1975  MEDICAL RECORD NUMBER:  [id number]  DATE:  12/2/2024    Multilayer compression wrap: Removed old Multilayer wrap if indicated and wash leg with mild soap/water.  Applied primary and secondary dressing as ordered.  Applied multilayered dressing below the knee to left lower leg.  Instructed patient/caregiver not to remove dressing and to keep it clean and dry.   Instructed patient/caregiver on complications to report to provider, such as pain, numbness in toes, heavy drainage, and slippage of dressing.  Instructed patient on purpose of compression dressing and on activity and exercise recommendations.     Applied  2 layer wrap from toes to knee overlapping each time    Electronically signed by GILL JIMENEZ RN on 12/2/2024 at 1:24 PM

## 2024-12-05 NOTE — PATIENT INSTRUCTIONS
[facility]  [address]   [place], [place] [address]  Telephone: [phone]     FAX [phone]    Discharge Instructions    Important reminders:    **If you have any signs and symptoms of illness (Cough, fever, congestion, nausea, vomiting, diarrhea, etc.) please call the wound care center prior to your appointment.    1. Increase Protein intake for optimal wound healing  2. No added salt to reduce any swelling  3. If diabetic, maintain good glucose control  4. If you smoke, smoking prohibits wound healing, we ask that you refrain from smoking.  5. When taking antibiotics take the entire prescription as ordered. Do not stop taking until medication is all gone unless otherwise instructed.   6. Exercise as tolerated.   7. Keep weight off wounds and reposition every 2 hours if applicable.  8. If wound(s) is on your lower extremity, elevate legs to the level of the heart or above for 30 minutes 4-5 times a day and/or when sitting. Avoid standing for long periods of time.   9. Do not get wounds wet in bath or shower unless otherwise instructed by your physician. If your wound is on your foot or leg, you may purchase a cast bag. Please ask at the pharmacy.      If Vascular testing is ordered, please call [phone] to schedule.    Vascular tests ordered by Wound Care Physicians may take up to 2 hours to complete. Please keep that in mind when scheduling.     If Vascular testing is scheduled, please bring supplies to replace your dressing after testing is done. The vascular department does not stock supplies.     Wound: Left leg     With each dressing change, rinse wounds with 0.9% Saline. (May use wound wash or soft contact solution. Both can be purchased at a local drug store). If unable to obtain saline, may use a gentle soap and water.    Dressing care: Look at [facility] for a new recliner. Take diuretic as ordered. Do not get leg wet in shower. Can get a cast back. Steroid cream to

## 2024-12-06 ENCOUNTER — HOSPITAL ENCOUNTER (OUTPATIENT)
Dept: WOUND CARE | Age: 49
Discharge: HOME | End: 2024-12-06
Attending: PODIATRIST
Payer: COMMERCIAL

## 2024-12-06 VITALS — SYSTOLIC BLOOD PRESSURE: 153 MMHG | RESPIRATION RATE: 18 BRPM | DIASTOLIC BLOOD PRESSURE: 85 MMHG | HEART RATE: 94 BPM

## 2024-12-06 DIAGNOSIS — I89.0 LYMPHEDEMA: ICD-10-CM

## 2024-12-06 DIAGNOSIS — I73.89 OTHER SPECIFIED PERIPHERAL VASCULAR DISEASES (HCC): Primary | ICD-10-CM

## 2024-12-06 DIAGNOSIS — R60.0 EDEMA LEG: ICD-10-CM

## 2024-12-06 DIAGNOSIS — L97.223 NON-PRESSURE CHRONIC ULCER OF LEFT CALF WITH NECROSIS OF MUSCLE (HCC): ICD-10-CM

## 2024-12-06 PROCEDURE — 29581 APPL MULTLAYER CMPRN SYS LEG: CPT

## 2024-12-06 PROCEDURE — 11043 DBRDMT MUSC&/FSCA 1ST 20/<: CPT

## 2024-12-06 RX ORDER — NEOMYCIN/BACITRACIN/POLYMYXINB 3.5-400-5K
OINTMENT (GRAM) TOPICAL ONCE
OUTPATIENT
Start: 2024-12-06 | End: 2024-12-06

## 2024-12-06 RX ORDER — TRIAMCINOLONE ACETONIDE 1 MG/G
OINTMENT TOPICAL ONCE
OUTPATIENT
Start: 2024-12-06 | End: 2024-12-06

## 2024-12-06 RX ORDER — GENTAMICIN SULFATE 1 MG/G
OINTMENT TOPICAL ONCE
OUTPATIENT
Start: 2024-12-06 | End: 2024-12-06

## 2024-12-06 RX ORDER — GINSENG 100 MG
CAPSULE ORAL ONCE
OUTPATIENT
Start: 2024-12-06 | End: 2024-12-06

## 2024-12-06 RX ORDER — BACITRACIN ZINC AND POLYMYXIN B SULFATE 500; 1000 [USP'U]/G; [USP'U]/G
OINTMENT TOPICAL ONCE
OUTPATIENT
Start: 2024-12-06 | End: 2024-12-06

## 2024-12-06 RX ORDER — SODIUM CHLOR/HYPOCHLOROUS ACID 0.033 %
SOLUTION, IRRIGATION IRRIGATION ONCE
OUTPATIENT
Start: 2024-12-06 | End: 2024-12-06

## 2024-12-06 RX ORDER — LIDOCAINE 40 MG/G
CREAM TOPICAL ONCE
Status: DISCONTINUED | OUTPATIENT
Start: 2024-12-06 | End: 2024-12-07 | Stop reason: HOSPADM

## 2024-12-06 RX ORDER — MUPIROCIN 20 MG/G
OINTMENT TOPICAL ONCE
OUTPATIENT
Start: 2024-12-06 | End: 2024-12-06

## 2024-12-06 RX ORDER — CLOBETASOL PROPIONATE 0.5 MG/G
OINTMENT TOPICAL ONCE
OUTPATIENT
Start: 2024-12-06 | End: 2024-12-06

## 2024-12-06 RX ORDER — LIDOCAINE 50 MG/G
OINTMENT TOPICAL ONCE
OUTPATIENT
Start: 2024-12-06 | End: 2024-12-06

## 2024-12-06 RX ORDER — BETAMETHASONE DIPROPIONATE 0.5 MG/G
CREAM TOPICAL ONCE
OUTPATIENT
Start: 2024-12-06 | End: 2024-12-06

## 2024-12-06 RX ORDER — LIDOCAINE HYDROCHLORIDE 20 MG/ML
JELLY TOPICAL ONCE
OUTPATIENT
Start: 2024-12-06 | End: 2024-12-06

## 2024-12-06 RX ORDER — SILVER SULFADIAZINE 10 MG/G
CREAM TOPICAL ONCE
OUTPATIENT
Start: 2024-12-06 | End: 2024-12-06

## 2024-12-06 RX ORDER — LIDOCAINE 40 MG/G
CREAM TOPICAL ONCE
OUTPATIENT
Start: 2024-12-06 | End: 2024-12-06

## 2024-12-06 RX ORDER — LIDOCAINE HYDROCHLORIDE 40 MG/ML
SOLUTION TOPICAL ONCE
OUTPATIENT
Start: 2024-12-06 | End: 2024-12-06

## 2024-12-06 NOTE — PLAN OF CARE
Discharge instructions given.  Patient verbalized understanding.  Return to [facility] in 1 week(s).

## 2024-12-06 NOTE — PLAN OF CARE
Insurance Verification Request            Ordering Center:    [facility]  [address]   [place], [place] [address]  Telephone: [phone]       FAX [phone]    Facility NPI: [id number]  Facility Tax ID [id number]    Angela Ramesh RN      Patient Demographics:      Tammie Mar  [address]  [facility] [address]   [phone]   : 1975  AGE: 49 y.o.     GENDER: female   TODAYS DATE:  2024      Insurance Information:     PRIMARY INSURANCE:  Plan: AMBETTER  Coverage: AMBETTER  Effective Date: 2022  Group Number: [unfilled]  Subscriber Number: [id number] - (Commercial)    Payer/Plan Subscr  Sex Relation Sub. Ins. ID Effective Group Num   1. AMBETTER - AM* TAMMIE MAR 1975 Female Self [id number]                                    [address]       Application/Product Information:    Benefit Verification Request:    Reason for Request: New Wound    [employer] ([employer]) Fax # [phone] and [employer] Fax # [phone]    Trunk/Arms/Legs [id number] (1st 25 sq cm)    Epicord, Epifix, and TheraSkin    Has patient been treated with any other Skin Substitute for this Wound:   No    If yes, How many previous applications: 10    WOUND #: 1    Total Square CM: 7.2    Procedure setting: Hospital Outpatient Department POS 22    Is the Patient currently in a skilled nursing facility: No     Is the wound work related: No    Procedure date: 2024        Clinical Information:    Dx Codes:   Diabetic Ulcer [] Yes   [x] No,   Venous Ulcer [x] Yes   [] No,   Other [] Yes   [x] No    Wound ICD-10 Code: L97.223, I73.89    Problem List Items Addressed This 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

## 2024-12-06 NOTE — PROGRESS NOTES
Multilayer Compression Wrap   Below the Knee    NAME:  Fani Mar  YOB: 1975  MEDICAL RECORD NUMBER:  [id number]  DATE:  12/6/2024    Multilayer compression wrap: Removed old Multilayer wrap if indicated and wash leg with mild soap/water.  Applied moisturizing agent to dry skin as needed.   Applied primary and secondary dressing as ordered.  Applied multilayered dressing below the knee to left lower leg.  Instructed patient/caregiver not to remove dressing and to keep it clean and dry.   Instructed patient/caregiver on complications to report to provider, such as pain, numbness in toes, heavy drainage, and slippage of dressing.  Instructed patient on purpose of compression dressing and on activity and exercise recommendations.     Applied  2 layer wrap from toes to knee overlapping each time    Electronically signed by Georgie Amador RN on 12/6/2024 at 11:10 AM        
Assessment Intact 12/06/24 0900   Margins Defined edges 12/06/24 0900   Number of days: 42       Wound 10/25/24 Leg Left;Lower;Posterior;Medial #2 (Active)   Wound Image   11/22/24 0852   Wound Etiology Venous 12/06/24 0900   Wound Cleansed Wound cleanser 12/06/24 0900   Wound Length (cm) 0.3 cm 12/06/24 0900   Wound Width (cm) 0.2 cm 12/06/24 0900   Wound Depth (cm) 0.1 cm 12/06/24 0900   Wound Surface Area (cm^2) 0.06 cm^2 12/06/24 0900   Change in Wound Size % (l*w) 94 12/06/24 0900   Wound Volume (cm^3) 0.006 cm^3 12/06/24 0900   Wound Healing % 94 12/06/24 0900   Post-Procedure Length (cm) 0.3 cm 12/06/24 0919   Post-Procedure Width (cm) 0.2 cm 12/06/24 0919   Post-Procedure Depth (cm) 0.1 cm 12/06/24 0919   Post-Procedure Surface Area (cm^2) 0.06 cm^2 12/06/24 0919   Post-Procedure Volume (cm^3) 0.006 cm^3 12/06/24 0919   Wound Assessment Bleeding 12/06/24 0919   Drainage Amount Small (< 25%) 12/06/24 0900   Drainage Description Yellow 12/06/24 0900   Odor None 12/06/24 0900   Maria De Jesus-wound Assessment Intact 12/06/24 0900   Margins Attached edges 12/06/24 0900   Number of days: 42       Total Surface Area Debrided:  7 sq cm     Percentage of wound debrided 100%    Bleeding:  Minimal    Hemostasis Achieved:  by pressure    Procedural Pain:  3  / 10     Post Procedural Pain:  0 / 10     Response to treatment:  Well tolerated by patient.           Plan:   Patient examined and evaluated  Wound sharply debrided without incident  Wound improving with increased granulation tissue decrease fibrotic tissue  Keep leg elevated at all times when not active  She is working on getting a lift chair  Multilayer compression dressing  Local erythema continues prescription for Augmentin for 10 days  Prescription for Lotrisone to put on the itchy areas.  2 times daily  The nature of the patient's condition was explained in depth. The patient was informed that their compliance to the treatment plan is paramount to successful healing

## 2024-12-11 NOTE — PATIENT INSTRUCTIONS
[facility]  [address]   [place], [place] [address]  Telephone: [phone]     FAX [phone]    Discharge Instructions    Important reminders:    **If you have any signs and symptoms of illness (Cough, fever, congestion, nausea, vomiting, diarrhea, etc.) please call the wound care center prior to your appointment.    1. Increase Protein intake for optimal wound healing  2. No added salt to reduce any swelling  3. If diabetic, maintain good glucose control  4. If you smoke, smoking prohibits wound healing, we ask that you refrain from smoking.  5. When taking antibiotics take the entire prescription as ordered. Do not stop taking until medication is all gone unless otherwise instructed.   6. Exercise as tolerated.   7. Keep weight off wounds and reposition every 2 hours if applicable.  8. If wound(s) is on your lower extremity, elevate legs to the level of the heart or above for 30 minutes 4-5 times a day and/or when sitting. Avoid standing for long periods of time.   9. Do not get wounds wet in bath or shower unless otherwise instructed by your physician. If your wound is on your foot or leg, you may purchase a cast bag. Please ask at the pharmacy.      If Vascular testing is ordered, please call [phone] to schedule.    Vascular tests ordered by Wound Care Physicians may take up to 2 hours to complete. Please keep that in mind when scheduling.     If Vascular testing is scheduled, please bring supplies to replace your dressing after testing is done. The vascular department does not stock supplies.     Wound: Left leg     With each dressing change, rinse wounds with 0.9% Saline. (May use wound wash or soft contact solution. Both can be purchased at a local drug store). If unable to obtain saline, may use a gentle soap and water.    Dressing care: Look at [facility] for a new recliner. Take diuretic as ordered. Do not get leg wet in shower. Can get a cast back. Steroid cream to

## 2024-12-13 ENCOUNTER — HOSPITAL ENCOUNTER (OUTPATIENT)
Dept: WOUND CARE | Age: 49
Discharge: HOME | End: 2024-12-13
Attending: PODIATRIST
Payer: COMMERCIAL

## 2024-12-13 VITALS — HEART RATE: 77 BPM | SYSTOLIC BLOOD PRESSURE: 150 MMHG | TEMPERATURE: 98 F | DIASTOLIC BLOOD PRESSURE: 70 MMHG

## 2024-12-13 DIAGNOSIS — I89.0 LYMPHEDEMA: ICD-10-CM

## 2024-12-13 DIAGNOSIS — L97.223 NON-PRESSURE CHRONIC ULCER OF LEFT CALF WITH NECROSIS OF MUSCLE (HCC): ICD-10-CM

## 2024-12-13 DIAGNOSIS — I73.89 OTHER SPECIFIED PERIPHERAL VASCULAR DISEASES (HCC): Primary | ICD-10-CM

## 2024-12-13 DIAGNOSIS — R60.0 EDEMA LEG: ICD-10-CM

## 2024-12-13 PROCEDURE — 29581 APPL MULTLAYER CMPRN SYS LEG: CPT

## 2024-12-13 PROCEDURE — 11043 DBRDMT MUSC&/FSCA 1ST 20/<: CPT

## 2024-12-13 RX ORDER — GINSENG 100 MG
CAPSULE ORAL ONCE
OUTPATIENT
Start: 2024-12-13 | End: 2024-12-13

## 2024-12-13 RX ORDER — LIDOCAINE 40 MG/G
CREAM TOPICAL ONCE
OUTPATIENT
Start: 2024-12-13 | End: 2024-12-13

## 2024-12-13 RX ORDER — BETAMETHASONE DIPROPIONATE 0.5 MG/G
CREAM TOPICAL ONCE
OUTPATIENT
Start: 2024-12-13 | End: 2024-12-13

## 2024-12-13 RX ORDER — SODIUM CHLOR/HYPOCHLOROUS ACID 0.033 %
SOLUTION, IRRIGATION IRRIGATION ONCE
OUTPATIENT
Start: 2024-12-13 | End: 2024-12-13

## 2024-12-13 RX ORDER — SILVER SULFADIAZINE 10 MG/G
CREAM TOPICAL ONCE
OUTPATIENT
Start: 2024-12-13 | End: 2024-12-13

## 2024-12-13 RX ORDER — LIDOCAINE 50 MG/G
OINTMENT TOPICAL ONCE
OUTPATIENT
Start: 2024-12-13 | End: 2024-12-13

## 2024-12-13 RX ORDER — TRIAMCINOLONE ACETONIDE 1 MG/G
OINTMENT TOPICAL ONCE
OUTPATIENT
Start: 2024-12-13 | End: 2024-12-13

## 2024-12-13 RX ORDER — CLOBETASOL PROPIONATE 0.5 MG/G
OINTMENT TOPICAL ONCE
OUTPATIENT
Start: 2024-12-13 | End: 2024-12-13

## 2024-12-13 RX ORDER — GENTAMICIN SULFATE 1 MG/G
OINTMENT TOPICAL ONCE
OUTPATIENT
Start: 2024-12-13 | End: 2024-12-13

## 2024-12-13 RX ORDER — LIDOCAINE HYDROCHLORIDE 20 MG/ML
JELLY TOPICAL ONCE
OUTPATIENT
Start: 2024-12-13 | End: 2024-12-13

## 2024-12-13 RX ORDER — MUPIROCIN 20 MG/G
OINTMENT TOPICAL ONCE
OUTPATIENT
Start: 2024-12-13 | End: 2024-12-13

## 2024-12-13 RX ORDER — LIDOCAINE HYDROCHLORIDE 40 MG/ML
SOLUTION TOPICAL ONCE
OUTPATIENT
Start: 2024-12-13 | End: 2024-12-13

## 2024-12-13 RX ORDER — BACITRACIN ZINC AND POLYMYXIN B SULFATE 500; 1000 [USP'U]/G; [USP'U]/G
OINTMENT TOPICAL ONCE
OUTPATIENT
Start: 2024-12-13 | End: 2024-12-13

## 2024-12-13 RX ORDER — NEOMYCIN/BACITRACIN/POLYMYXINB 3.5-400-5K
OINTMENT (GRAM) TOPICAL ONCE
OUTPATIENT
Start: 2024-12-13 | End: 2024-12-13

## 2024-12-13 RX ORDER — LIDOCAINE 40 MG/G
CREAM TOPICAL ONCE
Status: DISCONTINUED | OUTPATIENT
Start: 2024-12-13 | End: 2024-12-14 | Stop reason: HOSPADM

## 2024-12-13 ASSESSMENT — PAIN DESCRIPTION - ORIENTATION: ORIENTATION: LEFT

## 2024-12-13 ASSESSMENT — PAIN DESCRIPTION - FREQUENCY: FREQUENCY: CONTINUOUS

## 2024-12-13 ASSESSMENT — PAIN DESCRIPTION - LOCATION: LOCATION: LEG

## 2024-12-13 ASSESSMENT — PAIN DESCRIPTION - ONSET: ONSET: ON-GOING

## 2024-12-13 ASSESSMENT — PAIN - FUNCTIONAL ASSESSMENT: PAIN_FUNCTIONAL_ASSESSMENT: ACTIVITIES ARE NOT PREVENTED

## 2024-12-13 ASSESSMENT — PAIN SCALES - GENERAL: PAINLEVEL_OUTOF10: 2

## 2024-12-13 ASSESSMENT — PAIN DESCRIPTION - PAIN TYPE: TYPE: CHRONIC PAIN

## 2024-12-13 ASSESSMENT — PAIN DESCRIPTION - DESCRIPTORS: DESCRIPTORS: NAGGING;TENDER

## 2024-12-13 NOTE — PROGRESS NOTES
Multilayer Compression Wrap   Below the Knee    NAME:  Fani Mar  YOB: 1975  MEDICAL RECORD NUMBER:  [id number]  DATE:  12/13/2024    Multilayer compression wrap: Removed old Multilayer wrap if indicated and wash leg with mild soap/water.  Applied moisturizing agent to dry skin as needed.   Applied primary and secondary dressing as ordered.  Applied multilayered dressing below the knee to left lower leg.  Instructed patient/caregiver not to remove dressing and to keep it clean and dry.   Instructed patient/caregiver on complications to report to provider, such as pain, numbness in toes, heavy drainage, and slippage of dressing.  Instructed patient on purpose of compression dressing and on activity and exercise recommendations.     Applied  2 layer wrap from toes to knee overlapping each time    Electronically signed by ISABELLE LAZO RN on 12/13/2024 at 12:15 PM

## 2024-12-13 NOTE — PROGRESS NOTES
[facility]  Progress Note and Procedure Note      Fani Mar  AGE: 49 y.o.   GENDER: female  : 1975  TODAY'S DATE:  2024    Subjective:     Chief Complaint   Patient presents with    Wound Check     Left leg F/U         HISTORY of PRESENT ILLNESS HPI     Fani Mar is a 49 y.o. female who presents today for wound evaluation.   History of Wound: Patient elects to having wound for at least 4 weeks on the back of her leg.  She states it is a venous stasis ulceration.      She is left the dressing on as directed.  She is still trying to improve her at night when she is sleeping in the recliner.  She thinks she may be able to add another pillow under the leg.      Wound Pain:  intermittent  Severity:  3 / 10   Wound Type:  venous  Modifying Factors:  edema, venous stasis, and lymphedema  Associated Signs/Symptoms:  edema, erythema, drainage, and odor        PAST MEDICAL HISTORY        Diagnosis Date    Anxiety     Depression     Hypertension     PCOS (polycystic ovarian syndrome)        PAST SURGICAL HISTORY    Past Surgical History:   Procedure Laterality Date    APPENDECTOMY      TONSILLECTOMY AND ADENOIDECTOMY      TYMPANOSTOMY TUBE PLACEMENT         FAMILY HISTORY    Family History   Problem Relation Age of Onset    Diabetes Mother     Dementia Mother     High Blood Pressure Father     Pancreatic Cancer Father     Cancer Father         Pancreatic    Coronary Art Dis Sister         Congestive heart failure with pulmonary hypertension    Pancreatic Cancer Sister         PULM HTN    Cancer Brother 34        Brain tumor       SOCIAL HISTORY    Social History     Tobacco Use    Smoking status: Never     Passive exposure: Never    Smokeless tobacco: Never   Substance Use Topics    Alcohol use: Yes     Comment: 1 glass on holidays/spec. occassions, avg < 1/month    Drug use: Never       ALLERGIES    No Known Allergies    MEDICATIONS    Current Outpatient Medications on File

## 2024-12-13 NOTE — PLAN OF CARE
Discharge instructions given.  Patient verbalized understanding.  Return to [facility] in 1 week(s).

## 2024-12-19 NOTE — PATIENT INSTRUCTIONS
[facility]  [address]   [place], [place] [address]  Telephone: [phone]     FAX [phone]    Discharge Instructions    Important reminders:    **If you have any signs and symptoms of illness (Cough, fever, congestion, nausea, vomiting, diarrhea, etc.) please call the wound care center prior to your appointment.    1. Increase Protein intake for optimal wound healing  2. No added salt to reduce any swelling  3. If diabetic, maintain good glucose control  4. If you smoke, smoking prohibits wound healing, we ask that you refrain from smoking.  5. When taking antibiotics take the entire prescription as ordered. Do not stop taking until medication is all gone unless otherwise instructed.   6. Exercise as tolerated.   7. Keep weight off wounds and reposition every 2 hours if applicable.  8. If wound(s) is on your lower extremity, elevate legs to the level of the heart or above for 30 minutes 4-5 times a day and/or when sitting. Avoid standing for long periods of time.   9. Do not get wounds wet in bath or shower unless otherwise instructed by your physician. If your wound is on your foot or leg, you may purchase a cast bag. Please ask at the pharmacy.      If Vascular testing is ordered, please call [phone] to schedule.    Vascular tests ordered by Wound Care Physicians may take up to 2 hours to complete. Please keep that in mind when scheduling.     If Vascular testing is scheduled, please bring supplies to replace your dressing after testing is done. The vascular department does not stock supplies.     Wound: Left leg     With each dressing change, rinse wounds with 0.9% Saline. (May use wound wash or soft contact solution. Both can be purchased at a local drug store). If unable to obtain saline, may use a gentle soap and water.    Dressing care: Look at [facility] for a new recliner. Take diuretic as ordered. Do not get leg wet in shower. Can get a cast back. Lotrisone to leg,   14-Oct-2018 23:33

## 2024-12-20 ENCOUNTER — HOSPITAL ENCOUNTER (OUTPATIENT)
Dept: WOUND CARE | Age: 49
Discharge: HOME | End: 2024-12-20
Attending: PODIATRIST
Payer: COMMERCIAL

## 2024-12-20 VITALS — RESPIRATION RATE: 20 BRPM | HEART RATE: 94 BPM | SYSTOLIC BLOOD PRESSURE: 151 MMHG | DIASTOLIC BLOOD PRESSURE: 86 MMHG

## 2024-12-20 DIAGNOSIS — R60.0 EDEMA LEG: ICD-10-CM

## 2024-12-20 DIAGNOSIS — I89.0 LYMPHEDEMA: ICD-10-CM

## 2024-12-20 DIAGNOSIS — L97.223 NON-PRESSURE CHRONIC ULCER OF LEFT CALF WITH NECROSIS OF MUSCLE (HCC): ICD-10-CM

## 2024-12-20 DIAGNOSIS — I73.89 OTHER SPECIFIED PERIPHERAL VASCULAR DISEASES (HCC): Primary | ICD-10-CM

## 2024-12-20 PROCEDURE — 11043 DBRDMT MUSC&/FSCA 1ST 20/<: CPT

## 2024-12-20 PROCEDURE — 29581 APPL MULTLAYER CMPRN SYS LEG: CPT

## 2024-12-20 RX ORDER — LIDOCAINE HYDROCHLORIDE 40 MG/ML
SOLUTION TOPICAL ONCE
OUTPATIENT
Start: 2024-12-20 | End: 2024-12-20

## 2024-12-20 RX ORDER — BETAMETHASONE DIPROPIONATE 0.5 MG/G
CREAM TOPICAL ONCE
OUTPATIENT
Start: 2024-12-20 | End: 2024-12-20

## 2024-12-20 RX ORDER — SODIUM CHLOR/HYPOCHLOROUS ACID 0.033 %
SOLUTION, IRRIGATION IRRIGATION ONCE
OUTPATIENT
Start: 2024-12-20 | End: 2024-12-20

## 2024-12-20 RX ORDER — SILVER SULFADIAZINE 10 MG/G
CREAM TOPICAL ONCE
OUTPATIENT
Start: 2024-12-20 | End: 2024-12-20

## 2024-12-20 RX ORDER — CLOBETASOL PROPIONATE 0.5 MG/G
OINTMENT TOPICAL ONCE
OUTPATIENT
Start: 2024-12-20 | End: 2024-12-20

## 2024-12-20 RX ORDER — GINSENG 100 MG
CAPSULE ORAL ONCE
OUTPATIENT
Start: 2024-12-20 | End: 2024-12-20

## 2024-12-20 RX ORDER — LIDOCAINE HYDROCHLORIDE 20 MG/ML
JELLY TOPICAL ONCE
OUTPATIENT
Start: 2024-12-20 | End: 2024-12-20

## 2024-12-20 RX ORDER — MUPIROCIN 20 MG/G
OINTMENT TOPICAL ONCE
OUTPATIENT
Start: 2024-12-20 | End: 2024-12-20

## 2024-12-20 RX ORDER — LIDOCAINE 40 MG/G
CREAM TOPICAL ONCE
OUTPATIENT
Start: 2024-12-20 | End: 2024-12-20

## 2024-12-20 RX ORDER — LIDOCAINE 40 MG/G
CREAM TOPICAL ONCE
Status: DISCONTINUED | OUTPATIENT
Start: 2024-12-20 | End: 2024-12-21 | Stop reason: HOSPADM

## 2024-12-20 RX ORDER — LIDOCAINE 50 MG/G
OINTMENT TOPICAL ONCE
OUTPATIENT
Start: 2024-12-20 | End: 2024-12-20

## 2024-12-20 RX ORDER — NEOMYCIN/BACITRACIN/POLYMYXINB 3.5-400-5K
OINTMENT (GRAM) TOPICAL ONCE
OUTPATIENT
Start: 2024-12-20 | End: 2024-12-20

## 2024-12-20 RX ORDER — GENTAMICIN SULFATE 1 MG/G
OINTMENT TOPICAL ONCE
OUTPATIENT
Start: 2024-12-20 | End: 2024-12-20

## 2024-12-20 RX ORDER — TRIAMCINOLONE ACETONIDE 1 MG/G
OINTMENT TOPICAL ONCE
OUTPATIENT
Start: 2024-12-20 | End: 2024-12-20

## 2024-12-20 RX ORDER — BACITRACIN ZINC AND POLYMYXIN B SULFATE 500; 1000 [USP'U]/G; [USP'U]/G
OINTMENT TOPICAL ONCE
OUTPATIENT
Start: 2024-12-20 | End: 2024-12-20

## 2024-12-20 NOTE — PLAN OF CARE
Multilayer Compression Wrap   Below the Knee    NAME:  Fani Mar  YOB: 1975  MEDICAL RECORD NUMBER:  [id number]  DATE:  12/20/2024    Multilayer compression wrap: Removed old Multilayer wrap if indicated and wash leg with mild soap/water.  Applied moisturizing agent to dry skin as needed.   Applied primary and secondary dressing as ordered.  Applied multilayered dressing below the knee to left lower leg.  Instructed patient/caregiver not to remove dressing and to keep it clean and dry.   Instructed patient/caregiver on complications to report to provider, such as pain, numbness in toes, heavy drainage, and slippage of dressing.  Instructed patient on purpose of compression dressing and on activity and exercise recommendations.     Applied  2 layer wrap and ace wrap from toes to knee overlapping each time    Electronically signed by Georgie Amador RN on 12/20/2024 at 10:18 AM

## 2024-12-20 NOTE — PLAN OF CARE
Discharge instructions given.  Patient verbalized understanding.  Return to [facility] in 1 week(s).

## 2024-12-20 NOTE — PROGRESS NOTES
cream    Other Relevant Orders    Initiate Outpatient Wound Care Protocol    Edema leg    Relevant Medications    lidocaine (LMX) 4 % cream    Other Relevant Orders    Initiate Outpatient Wound Care Protocol    Other specified peripheral vascular diseases (HCC) - Primary    Relevant Medications    lidocaine (LMX) 4 % cream    Other Relevant Orders    Initiate Outpatient Wound Care Protocol       Procedure Note    Performed by: Siddhartha Carreno DPM    Consent obtained: Yes    Time out taken:  Yes    Pain Control: Anesthetic  Anesthetic: 4% Lidocaine Cream     Debridement:Excisional Debridement    Using curette the wound was sharply debrided    down through and including the removal of epidermis, dermis, subcutaneous tissue, and muscle/fascia.        Devitalized Tissue Debrided:  fibrin, biofilm, slough, necrotic/eschar, and exudate    Pre Debridement Measurements:  Are located in the Wound Documentation Flow Sheet    Wound #: 1   Wound Care Documentation:  Wound 10/25/24 Leg Left;Posterior;Lower;Lateral #1 (Active)   Wound Image   11/22/24 0852   Wound Etiology Venous 12/20/24 0908   Wound Cleansed Wound cleanser 12/20/24 0908   Wound Length (cm) 2.2 cm 12/20/24 0908   Wound Width (cm) 2.6 cm 12/20/24 0908   Wound Depth (cm) 0.2 cm 12/20/24 0908   Wound Surface Area (cm^2) 5.72 cm^2 12/20/24 0908   Change in Wound Size % (l*w) -2.14 12/20/24 0908   Wound Volume (cm^3) 1.144 cm^3 12/20/24 0908   Wound Healing % -104 12/20/24 0908   Post-Procedure Length (cm) 2.2 cm 12/20/24 0957   Post-Procedure Width (cm) 2.6 cm 12/20/24 0957   Post-Procedure Depth (cm) 0.2 cm 12/20/24 0957   Post-Procedure Surface Area (cm^2) 5.72 cm^2 12/20/24 0957   Post-Procedure Volume (cm^3) 1.144 cm^3 12/20/24 0957   Wound Assessment Bleeding 12/20/24 0957   Drainage Amount Moderate (25-50%) 12/20/24 0908   Drainage Description Brown 12/20/24 0908   Odor None 12/20/24 0908   Maria De Jesus-wound Assessment Excoriated 12/20/24 0908   Margins Defined

## 2024-12-23 NOTE — PATIENT INSTRUCTIONS
[facility]  [address]   [place], [place] [address]  Telephone: [phone]     FAX [phone]    Discharge Instructions    Important reminders:    **If you have any signs and symptoms of illness (Cough, fever, congestion, nausea, vomiting, diarrhea, etc.) please call the wound care center prior to your appointment.    1. Increase Protein intake for optimal wound healing  2. No added salt to reduce any swelling  3. If diabetic, maintain good glucose control  4. If you smoke, smoking prohibits wound healing, we ask that you refrain from smoking.  5. When taking antibiotics take the entire prescription as ordered. Do not stop taking until medication is all gone unless otherwise instructed.   6. Exercise as tolerated.   7. Keep weight off wounds and reposition every 2 hours if applicable.  8. If wound(s) is on your lower extremity, elevate legs to the level of the heart or above for 30 minutes 4-5 times a day and/or when sitting. Avoid standing for long periods of time.   9. Do not get wounds wet in bath or shower unless otherwise instructed by your physician. If your wound is on your foot or leg, you may purchase a cast bag. Please ask at the pharmacy.      If Vascular testing is ordered, please call [phone] to schedule.    Vascular tests ordered by Wound Care Physicians may take up to 2 hours to complete. Please keep that in mind when scheduling.     If Vascular testing is scheduled, please bring supplies to replace your dressing after testing is done. The vascular department does not stock supplies.     Wound: Left leg     With each dressing change, rinse wounds with 0.9% Saline. (May use wound wash or soft contact solution. Both can be purchased at a local drug store). If unable to obtain saline, may use a gentle soap and water.    Dressing care: Look at [facility] for a new recliner. Take diuretic as ordered. Do not get leg wet in shower. Can get a cast back. Lotrisone to leg,

## 2024-12-27 ENCOUNTER — HOSPITAL ENCOUNTER (OUTPATIENT)
Dept: WOUND CARE | Age: 49
Discharge: HOME | End: 2024-12-27
Attending: PODIATRIST
Payer: COMMERCIAL

## 2024-12-27 VITALS
SYSTOLIC BLOOD PRESSURE: 148 MMHG | RESPIRATION RATE: 18 BRPM | HEART RATE: 97 BPM | TEMPERATURE: 97 F | DIASTOLIC BLOOD PRESSURE: 85 MMHG

## 2024-12-27 DIAGNOSIS — I89.0 LYMPHEDEMA: ICD-10-CM

## 2024-12-27 DIAGNOSIS — L97.223 NON-PRESSURE CHRONIC ULCER OF LEFT CALF WITH NECROSIS OF MUSCLE (HCC): ICD-10-CM

## 2024-12-27 DIAGNOSIS — R60.0 EDEMA LEG: ICD-10-CM

## 2024-12-27 DIAGNOSIS — I73.89 OTHER SPECIFIED PERIPHERAL VASCULAR DISEASES (HCC): Primary | ICD-10-CM

## 2024-12-27 PROCEDURE — 11042 DBRDMT SUBQ TIS 1ST 20SQCM/<: CPT

## 2024-12-27 PROCEDURE — 29581 APPL MULTLAYER CMPRN SYS LEG: CPT

## 2024-12-27 RX ORDER — SILVER SULFADIAZINE 10 MG/G
CREAM TOPICAL ONCE
OUTPATIENT
Start: 2024-12-27 | End: 2024-12-27

## 2024-12-27 RX ORDER — LIDOCAINE 40 MG/G
CREAM TOPICAL ONCE
OUTPATIENT
Start: 2024-12-27 | End: 2024-12-27

## 2024-12-27 RX ORDER — TRIAMCINOLONE ACETONIDE 1 MG/G
OINTMENT TOPICAL ONCE
OUTPATIENT
Start: 2024-12-27 | End: 2024-12-27

## 2024-12-27 RX ORDER — GINSENG 100 MG
CAPSULE ORAL ONCE
OUTPATIENT
Start: 2024-12-27 | End: 2024-12-27

## 2024-12-27 RX ORDER — SODIUM CHLOR/HYPOCHLOROUS ACID 0.033 %
SOLUTION, IRRIGATION IRRIGATION ONCE
OUTPATIENT
Start: 2024-12-27 | End: 2024-12-27

## 2024-12-27 RX ORDER — LIDOCAINE 50 MG/G
OINTMENT TOPICAL ONCE
OUTPATIENT
Start: 2024-12-27 | End: 2024-12-27

## 2024-12-27 RX ORDER — LIDOCAINE HYDROCHLORIDE 20 MG/ML
JELLY TOPICAL ONCE
OUTPATIENT
Start: 2024-12-27 | End: 2024-12-27

## 2024-12-27 RX ORDER — NEOMYCIN/BACITRACIN/POLYMYXINB 3.5-400-5K
OINTMENT (GRAM) TOPICAL ONCE
OUTPATIENT
Start: 2024-12-27 | End: 2024-12-27

## 2024-12-27 RX ORDER — BACITRACIN ZINC AND POLYMYXIN B SULFATE 500; 1000 [USP'U]/G; [USP'U]/G
OINTMENT TOPICAL ONCE
OUTPATIENT
Start: 2024-12-27 | End: 2024-12-27

## 2024-12-27 RX ORDER — MUPIROCIN 20 MG/G
OINTMENT TOPICAL ONCE
OUTPATIENT
Start: 2024-12-27 | End: 2024-12-27

## 2024-12-27 RX ORDER — LIDOCAINE HYDROCHLORIDE 40 MG/ML
SOLUTION TOPICAL ONCE
OUTPATIENT
Start: 2024-12-27 | End: 2024-12-27

## 2024-12-27 RX ORDER — LIDOCAINE 40 MG/G
CREAM TOPICAL ONCE
Status: DISCONTINUED | OUTPATIENT
Start: 2024-12-27 | End: 2024-12-28 | Stop reason: HOSPADM

## 2024-12-27 RX ORDER — CLOBETASOL PROPIONATE 0.5 MG/G
OINTMENT TOPICAL ONCE
OUTPATIENT
Start: 2024-12-27 | End: 2024-12-27

## 2024-12-27 RX ORDER — BETAMETHASONE DIPROPIONATE 0.5 MG/G
CREAM TOPICAL ONCE
OUTPATIENT
Start: 2024-12-27 | End: 2024-12-27

## 2024-12-27 RX ORDER — GENTAMICIN SULFATE 1 MG/G
OINTMENT TOPICAL ONCE
OUTPATIENT
Start: 2024-12-27 | End: 2024-12-27

## 2024-12-27 NOTE — PROGRESS NOTES
Multilayer Compression Wrap   Below the Knee    NAME:  Fani Mar  YOB: 1975  MEDICAL RECORD NUMBER:  [id number]  DATE:  12/27/2024    Multilayer compression wrap: Removed old Multilayer wrap if indicated and wash leg with mild soap/water.  Applied moisturizing agent to dry skin as needed.   Applied primary and secondary dressing as ordered.  Applied multilayered dressing below the knee to left lower leg.  Instructed patient/caregiver not to remove dressing and to keep it clean and dry.   Instructed patient/caregiver on complications to report to provider, such as pain, numbness in toes, heavy drainage, and slippage of dressing.  Instructed patient on purpose of compression dressing and on activity and exercise recommendations.     Applied  2 layer wrap from toes to knee overlapping each time    Electronically signed by OSMEL FARRELL LPN on 12/27/2024 at 1:00 PM       
Apply topically 2 times daily. To affected areas 45 g 0    diclofenac (VOLTAREN) 50 MG EC tablet TAKE 1 TABLET BY MOUTH 3 TIMES A DAY WITH A MEAL 90 tablet 1    vilazodone HCl (VIIBRYD) 20 MG TABS 1 TABLET BY MOUTH DAILY 90 tablet 1    furosemide (LASIX) 20 MG tablet 0.5 TAB AS DAILY AS NEEDED FOR DEPENDENT EDEMA 60 tablet 1    lisinopril-hydroCHLOROthiazide (PRINZIDE;ZESTORETIC) 10-12.5 MG per tablet Take 1 tablet by mouth daily 90 tablet 3     No current facility-administered medications on file prior to encounter.       REVIEW OF SYSTEMS    Pertinent items are noted in HPI.      Objective:      BP (!) 148/85   Pulse 97   Temp 97 °F (36.1 °C) (Infrared)   Resp 18     PHYSICAL EXAM    Vascular: Vascular status Intact  palpable pedal pulses, right DP2/4 and PT2/4, left DP2/4 and PT2/4.  CFT 2 seconds digits 1 to 5 bilateral.  Hair growthAbsent  both lower extremities and feet.  Skin temperature is warm to warm from pretibial area to distal digits bilateral.  Exam is negative for rubor, pallor, cyanosis or signs of acute vascular compromise bilaterally.  Exam is positive for edema bilateral lower extremity.  Varicosities Present bilateral lower extremity.   Neuro: Neurologic status normal bilateral with epicritic Present , proprioceptive Present, vibratory sensationPresent and protopathicPresent.  DTR’s Present bilateral Achilles. There were no reproducible neuritic symptoms on exam bilateral feet/ankles.  Derm: Ulceration to left calf.  Ecchymosis Absent  bilateral feet/foot.    Musculoskeletal: Mild pain with wound debridement 5/5 muscle strength in/eversion and dorsi/plantarflexion bilateral feet.  No gross instability noted.         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

## 2024-12-27 NOTE — PLAN OF CARE
Discharge instructions given.  Patient verbalized understanding.  Return to [facility] in 1 week(s).

## 2025-01-02 NOTE — PATIENT INSTRUCTIONS
[facility]  [address]   [place], [place] [address]  Telephone: [phone]     FAX [phone]    Discharge Instructions    Important reminders:    **If you have any signs and symptoms of illness (Cough, fever, congestion, nausea, vomiting, diarrhea, etc.) please call the wound care center prior to your appointment.    1. Increase Protein intake for optimal wound healing  2. No added salt to reduce any swelling  3. If diabetic, maintain good glucose control  4. If you smoke, smoking prohibits wound healing, we ask that you refrain from smoking.  5. When taking antibiotics take the entire prescription as ordered. Do not stop taking until medication is all gone unless otherwise instructed.   6. Exercise as tolerated.   7. Keep weight off wounds and reposition every 2 hours if applicable.  8. If wound(s) is on your lower extremity, elevate legs to the level of the heart or above for 30 minutes 4-5 times a day and/or when sitting. Avoid standing for long periods of time.   9. Do not get wounds wet in bath or shower unless otherwise instructed by your physician. If your wound is on your foot or leg, you may purchase a cast bag. Please ask at the pharmacy.      If Vascular testing is ordered, please call [phone] to schedule.    Vascular tests ordered by Wound Care Physicians may take up to 2 hours to complete. Please keep that in mind when scheduling.     If Vascular testing is scheduled, please bring supplies to replace your dressing after testing is done. The vascular department does not stock supplies.     Wound: Left leg     With each dressing change, rinse wounds with 0.9% Saline. (May use wound wash or soft contact solution. Both can be purchased at a local drug store). If unable to obtain saline, may use a gentle soap and water.    Dressing care: NEEDS PHOTO Both wounds 1/3. Look at [facility] for a new recliner. Take diuretic as ordered. Do not get leg wet in shower. Can get a cast

## 2025-01-03 ENCOUNTER — HOSPITAL ENCOUNTER (OUTPATIENT)
Dept: WOUND CARE | Age: 50
Discharge: HOME | End: 2025-01-03
Attending: PODIATRIST
Payer: COMMERCIAL

## 2025-01-03 VITALS — HEART RATE: 90 BPM | SYSTOLIC BLOOD PRESSURE: 187 MMHG | RESPIRATION RATE: 19 BRPM | DIASTOLIC BLOOD PRESSURE: 85 MMHG

## 2025-01-03 DIAGNOSIS — L97.223 NON-PRESSURE CHRONIC ULCER OF LEFT CALF WITH NECROSIS OF MUSCLE (HCC): ICD-10-CM

## 2025-01-03 DIAGNOSIS — R60.0 EDEMA LEG: ICD-10-CM

## 2025-01-03 DIAGNOSIS — I89.0 LYMPHEDEMA: ICD-10-CM

## 2025-01-03 DIAGNOSIS — I73.89 OTHER SPECIFIED PERIPHERAL VASCULAR DISEASES (HCC): Primary | ICD-10-CM

## 2025-01-03 PROCEDURE — 11043 DBRDMT MUSC&/FSCA 1ST 20/<: CPT

## 2025-01-03 PROCEDURE — 29581 APPL MULTLAYER CMPRN SYS LEG: CPT

## 2025-01-03 RX ORDER — LIDOCAINE HYDROCHLORIDE 20 MG/ML
JELLY TOPICAL ONCE
OUTPATIENT
Start: 2025-01-03 | End: 2025-01-03

## 2025-01-03 RX ORDER — MUPIROCIN 20 MG/G
OINTMENT TOPICAL ONCE
OUTPATIENT
Start: 2025-01-03 | End: 2025-01-03

## 2025-01-03 RX ORDER — NEOMYCIN/BACITRACIN/POLYMYXINB 3.5-400-5K
OINTMENT (GRAM) TOPICAL ONCE
OUTPATIENT
Start: 2025-01-03 | End: 2025-01-03

## 2025-01-03 RX ORDER — CLOBETASOL PROPIONATE 0.5 MG/G
OINTMENT TOPICAL ONCE
OUTPATIENT
Start: 2025-01-03 | End: 2025-01-03

## 2025-01-03 RX ORDER — LIDOCAINE 50 MG/G
OINTMENT TOPICAL ONCE
OUTPATIENT
Start: 2025-01-03 | End: 2025-01-03

## 2025-01-03 RX ORDER — GINSENG 100 MG
CAPSULE ORAL ONCE
OUTPATIENT
Start: 2025-01-03 | End: 2025-01-03

## 2025-01-03 RX ORDER — LIDOCAINE 40 MG/G
CREAM TOPICAL ONCE
OUTPATIENT
Start: 2025-01-03 | End: 2025-01-03

## 2025-01-03 RX ORDER — SILVER SULFADIAZINE 10 MG/G
CREAM TOPICAL ONCE
OUTPATIENT
Start: 2025-01-03 | End: 2025-01-03

## 2025-01-03 RX ORDER — LIDOCAINE HYDROCHLORIDE 40 MG/ML
SOLUTION TOPICAL ONCE
OUTPATIENT
Start: 2025-01-03 | End: 2025-01-03

## 2025-01-03 RX ORDER — BETAMETHASONE DIPROPIONATE 0.5 MG/G
CREAM TOPICAL ONCE
OUTPATIENT
Start: 2025-01-03 | End: 2025-01-03

## 2025-01-03 RX ORDER — SODIUM CHLOR/HYPOCHLOROUS ACID 0.033 %
SOLUTION, IRRIGATION IRRIGATION ONCE
OUTPATIENT
Start: 2025-01-03 | End: 2025-01-03

## 2025-01-03 RX ORDER — BACITRACIN ZINC AND POLYMYXIN B SULFATE 500; 1000 [USP'U]/G; [USP'U]/G
OINTMENT TOPICAL ONCE
OUTPATIENT
Start: 2025-01-03 | End: 2025-01-03

## 2025-01-03 RX ORDER — TRIAMCINOLONE ACETONIDE 1 MG/G
OINTMENT TOPICAL ONCE
OUTPATIENT
Start: 2025-01-03 | End: 2025-01-03

## 2025-01-03 RX ORDER — LIDOCAINE 40 MG/G
CREAM TOPICAL ONCE
Status: DISCONTINUED | OUTPATIENT
Start: 2025-01-03 | End: 2025-01-04 | Stop reason: HOSPADM

## 2025-01-03 RX ORDER — GENTAMICIN SULFATE 1 MG/G
OINTMENT TOPICAL ONCE
OUTPATIENT
Start: 2025-01-03 | End: 2025-01-03

## 2025-01-03 NOTE — PROGRESS NOTES
Multilayer Compression Wrap   Below the Knee    NAME:  Fani Mar  YOB: 1975  MEDICAL RECORD NUMBER:  [id number]  DATE:  1/3/2025    Multilayer compression wrap: Removed old Multilayer wrap if indicated and wash leg with mild soap/water.  Applied moisturizing agent to dry skin as needed.   Applied primary and secondary dressing as ordered.  Applied multilayered dressing below the knee to right lower leg.  Applied multilayered dressing below the knee to left lower leg.  Instructed patient/caregiver not to remove dressing and to keep it clean and dry.   Instructed patient/caregiver on complications to report to provider, such as pain, numbness in toes, heavy drainage, and slippage of dressing.  Instructed patient on purpose of compression dressing and on activity and exercise recommendations.     Applied  2 layer wrap and 6\" ace wrap from toes to knee overlapping each time    Electronically signed by Georgie Amador RN on 1/3/2025 at 9:24 AM        
    Electronically signed by Siddhartha Carreno DPM on 1/3/2025 at 9:27 AM

## 2025-01-03 NOTE — PLAN OF CARE
Discharge instructions given.  Patient verbalized understanding.  Return to [facility] in 1 week(s).

## 2025-01-10 ENCOUNTER — HOSPITAL ENCOUNTER (OUTPATIENT)
Dept: WOUND CARE | Age: 50
Discharge: HOME | End: 2025-01-10
Attending: PODIATRIST
Payer: COMMERCIAL

## 2025-01-10 DIAGNOSIS — L97.223 NON-PRESSURE CHRONIC ULCER OF LEFT CALF WITH NECROSIS OF MUSCLE (HCC): ICD-10-CM

## 2025-01-10 DIAGNOSIS — I89.0 LYMPHEDEMA: ICD-10-CM

## 2025-01-10 DIAGNOSIS — R60.0 EDEMA LEG: ICD-10-CM

## 2025-01-10 DIAGNOSIS — I73.89 OTHER SPECIFIED PERIPHERAL VASCULAR DISEASES (HCC): Primary | ICD-10-CM

## 2025-01-10 PROCEDURE — 29581 APPL MULTLAYER CMPRN SYS LEG: CPT

## 2025-01-10 RX ORDER — LIDOCAINE HYDROCHLORIDE 20 MG/ML
JELLY TOPICAL ONCE
OUTPATIENT
Start: 2025-01-10 | End: 2025-01-10

## 2025-01-10 RX ORDER — GENTAMICIN SULFATE 1 MG/G
OINTMENT TOPICAL ONCE
OUTPATIENT
Start: 2025-01-10 | End: 2025-01-10

## 2025-01-10 RX ORDER — TRIAMCINOLONE ACETONIDE 1 MG/G
OINTMENT TOPICAL ONCE
OUTPATIENT
Start: 2025-01-10 | End: 2025-01-10

## 2025-01-10 RX ORDER — MUPIROCIN 20 MG/G
OINTMENT TOPICAL ONCE
OUTPATIENT
Start: 2025-01-10 | End: 2025-01-10

## 2025-01-10 RX ORDER — LIDOCAINE 50 MG/G
OINTMENT TOPICAL ONCE
OUTPATIENT
Start: 2025-01-10 | End: 2025-01-10

## 2025-01-10 RX ORDER — SODIUM CHLOR/HYPOCHLOROUS ACID 0.033 %
SOLUTION, IRRIGATION IRRIGATION ONCE
OUTPATIENT
Start: 2025-01-10 | End: 2025-01-10

## 2025-01-10 RX ORDER — BACITRACIN ZINC AND POLYMYXIN B SULFATE 500; 1000 [USP'U]/G; [USP'U]/G
OINTMENT TOPICAL ONCE
OUTPATIENT
Start: 2025-01-10 | End: 2025-01-10

## 2025-01-10 RX ORDER — LIDOCAINE 40 MG/G
CREAM TOPICAL ONCE
OUTPATIENT
Start: 2025-01-10 | End: 2025-01-10

## 2025-01-10 RX ORDER — CLOBETASOL PROPIONATE 0.5 MG/G
OINTMENT TOPICAL ONCE
OUTPATIENT
Start: 2025-01-10 | End: 2025-01-10

## 2025-01-10 RX ORDER — SILVER SULFADIAZINE 10 MG/G
CREAM TOPICAL ONCE
OUTPATIENT
Start: 2025-01-10 | End: 2025-01-10

## 2025-01-10 RX ORDER — LIDOCAINE HYDROCHLORIDE 40 MG/ML
SOLUTION TOPICAL ONCE
OUTPATIENT
Start: 2025-01-10 | End: 2025-01-10

## 2025-01-10 RX ORDER — GINSENG 100 MG
CAPSULE ORAL ONCE
OUTPATIENT
Start: 2025-01-10 | End: 2025-01-10

## 2025-01-10 RX ORDER — BETAMETHASONE DIPROPIONATE 0.5 MG/G
CREAM TOPICAL ONCE
OUTPATIENT
Start: 2025-01-10 | End: 2025-01-10

## 2025-01-10 RX ORDER — NEOMYCIN/BACITRACIN/POLYMYXINB 3.5-400-5K
OINTMENT (GRAM) TOPICAL ONCE
OUTPATIENT
Start: 2025-01-10 | End: 2025-01-10

## 2025-01-10 NOTE — PATIENT INSTRUCTIONS
your insurance coverage, you may have out-of-pocket expenses for these supplies. Someone from the company should call you to confirm your order and discuss those potential costs before they ship your products -- please anticipate that call. If your out-of-pocket cost could be substantial, Many companies have financial hardship programs for patients who qualify, so please ask about that if you might need a hand. If you have any questions about your supplies or your potential out-of-pocket costs, or if you need to place an order for a refill of supplies (typically monthly), please call the company directly.     Your  is Angela    Follow up with Dr Carreno In 1 week(s) in the wound care center. For Nurse visit    Wound Care Center Information: Should you experience any significant changes in your wound(s) or have questions about your wound care, please contact the [facility] at [phone] Monday  - Thursday 8:00 am - 4:00 pm and Friday 8:00 am - 1:00pm. If you need help with your wound outside these hours and cannot wait until we are again available, contact your PCP or go to the hospital emergency room.     PLEASE NOTE: IF YOU ARE UNABLE TO OBTAIN WOUND SUPPLIES, CONTINUE TO USE THE SUPPLIES YOU HAVE AVAILABLE UNTIL YOU ARE ABLE TO REACH US. IT IS MOST IMPORTANT TO KEEP THE WOUND COVERED AT ALL TIMES.    Patient Experience    Thank you for trusting us with your care.  You may receive a survey from a company called [email or web address] asking for your feedback.  We would appreciate it if you took a few minutes to share your experience.  Your input is very valuable to us.

## 2025-01-10 NOTE — PLAN OF CARE
Multilayer Compression Wrap   Below the Knee    NAME:  Fani Mar  YOB: 1975  MEDICAL RECORD NUMBER:  [id number]  DATE:  1/10/2025    Multilayer compression wrap: Removed old Multilayer wrap if indicated and wash leg with mild soap/water.  Applied moisturizing agent to dry skin as needed.   Applied primary and secondary dressing as ordered.  Applied multilayered dressing below the knee to right lower leg.  Applied multilayered dressing below the knee to left lower leg.  Instructed patient/caregiver not to remove dressing and to keep it clean and dry.   Instructed patient/caregiver on complications to report to provider, such as pain, numbness in toes, heavy drainage, and slippage of dressing.  Instructed patient on purpose of compression dressing and on activity and exercise recommendations.     Applied  Unna Boot wrap from toes to knee overlapping each time    Electronically signed by Angela Ramesh RN on 1/10/2025 at 9:08 AM

## 2025-01-16 NOTE — PATIENT INSTRUCTIONS
[facility]  [address]   [place], [place] [address]  Telephone: [phone]     FAX [phone]    Discharge Instructions    Important reminders:    **If you have any signs and symptoms of illness (Cough, fever, congestion, nausea, vomiting, diarrhea, etc.) please call the wound care center prior to your appointment.    1. Increase Protein intake for optimal wound healing  2. No added salt to reduce any swelling  3. If diabetic, maintain good glucose control  4. If you smoke, smoking prohibits wound healing, we ask that you refrain from smoking.  5. When taking antibiotics take the entire prescription as ordered. Do not stop taking until medication is all gone unless otherwise instructed.   6. Exercise as tolerated.   7. Keep weight off wounds and reposition every 2 hours if applicable.  8. If wound(s) is on your lower extremity, elevate legs to the level of the heart or above for 30 minutes 4-5 times a day and/or when sitting. Avoid standing for long periods of time.   9. Do not get wounds wet in bath or shower unless otherwise instructed by your physician. If your wound is on your foot or leg, you may purchase a cast bag. Please ask at the pharmacy.      If Vascular testing is ordered, please call [phone] to schedule.    Vascular tests ordered by Wound Care Physicians may take up to 2 hours to complete. Please keep that in mind when scheduling.     If Vascular testing is scheduled, please bring supplies to replace your dressing after testing is done. The vascular department does not stock supplies.     Wound: Left leg     With each dressing change, rinse wounds with 0.9% Saline. (May use wound wash or soft contact solution. Both can be purchased at a local drug store). If unable to obtain saline, may use a gentle soap and water.    Dressing care: Look at [facility] for a new recliner. Take diuretic as ordered. Do not get leg wet in shower. Can get a cast back.   Right leg - Lotrisone

## 2025-01-17 ENCOUNTER — HOSPITAL ENCOUNTER (OUTPATIENT)
Dept: WOUND CARE | Age: 50
Discharge: HOME | End: 2025-01-17
Attending: PODIATRIST
Payer: COMMERCIAL

## 2025-01-17 VITALS
HEART RATE: 91 BPM | TEMPERATURE: 98.4 F | RESPIRATION RATE: 18 BRPM | DIASTOLIC BLOOD PRESSURE: 86 MMHG | SYSTOLIC BLOOD PRESSURE: 175 MMHG

## 2025-01-17 DIAGNOSIS — R60.0 EDEMA LEG: ICD-10-CM

## 2025-01-17 DIAGNOSIS — L97.223 NON-PRESSURE CHRONIC ULCER OF LEFT CALF WITH NECROSIS OF MUSCLE (HCC): ICD-10-CM

## 2025-01-17 DIAGNOSIS — I73.89 OTHER SPECIFIED PERIPHERAL VASCULAR DISEASES (HCC): Primary | ICD-10-CM

## 2025-01-17 DIAGNOSIS — I89.0 LYMPHEDEMA: ICD-10-CM

## 2025-01-17 PROCEDURE — 11042 DBRDMT SUBQ TIS 1ST 20SQCM/<: CPT

## 2025-01-17 PROCEDURE — 29581 APPL MULTLAYER CMPRN SYS LEG: CPT

## 2025-01-17 RX ORDER — LIDOCAINE 50 MG/G
OINTMENT TOPICAL ONCE
OUTPATIENT
Start: 2025-01-17 | End: 2025-01-17

## 2025-01-17 RX ORDER — SODIUM CHLOR/HYPOCHLOROUS ACID 0.033 %
SOLUTION, IRRIGATION IRRIGATION ONCE
OUTPATIENT
Start: 2025-01-17 | End: 2025-01-17

## 2025-01-17 RX ORDER — LIDOCAINE 40 MG/G
CREAM TOPICAL ONCE
OUTPATIENT
Start: 2025-01-17 | End: 2025-01-17

## 2025-01-17 RX ORDER — BACITRACIN ZINC AND POLYMYXIN B SULFATE 500; 1000 [USP'U]/G; [USP'U]/G
OINTMENT TOPICAL ONCE
OUTPATIENT
Start: 2025-01-17 | End: 2025-01-17

## 2025-01-17 RX ORDER — CLOBETASOL PROPIONATE 0.5 MG/G
OINTMENT TOPICAL ONCE
OUTPATIENT
Start: 2025-01-17 | End: 2025-01-17

## 2025-01-17 RX ORDER — BETAMETHASONE DIPROPIONATE 0.5 MG/G
CREAM TOPICAL ONCE
OUTPATIENT
Start: 2025-01-17 | End: 2025-01-17

## 2025-01-17 RX ORDER — LIDOCAINE HYDROCHLORIDE 20 MG/ML
JELLY TOPICAL ONCE
OUTPATIENT
Start: 2025-01-17 | End: 2025-01-17

## 2025-01-17 RX ORDER — MUPIROCIN 20 MG/G
OINTMENT TOPICAL ONCE
OUTPATIENT
Start: 2025-01-17 | End: 2025-01-17

## 2025-01-17 RX ORDER — SILVER SULFADIAZINE 10 MG/G
CREAM TOPICAL ONCE
OUTPATIENT
Start: 2025-01-17 | End: 2025-01-17

## 2025-01-17 RX ORDER — TRIAMCINOLONE ACETONIDE 1 MG/G
OINTMENT TOPICAL ONCE
OUTPATIENT
Start: 2025-01-17 | End: 2025-01-17

## 2025-01-17 RX ORDER — GINSENG 100 MG
CAPSULE ORAL ONCE
OUTPATIENT
Start: 2025-01-17 | End: 2025-01-17

## 2025-01-17 RX ORDER — LIDOCAINE HYDROCHLORIDE 40 MG/ML
SOLUTION TOPICAL ONCE
OUTPATIENT
Start: 2025-01-17 | End: 2025-01-17

## 2025-01-17 RX ORDER — GENTAMICIN SULFATE 1 MG/G
OINTMENT TOPICAL ONCE
OUTPATIENT
Start: 2025-01-17 | End: 2025-01-17

## 2025-01-17 RX ORDER — NEOMYCIN/BACITRACIN/POLYMYXINB 3.5-400-5K
OINTMENT (GRAM) TOPICAL ONCE
OUTPATIENT
Start: 2025-01-17 | End: 2025-01-17

## 2025-01-17 RX ORDER — LIDOCAINE 40 MG/G
CREAM TOPICAL ONCE
Status: DISCONTINUED | OUTPATIENT
Start: 2025-01-17 | End: 2025-01-18 | Stop reason: HOSPADM

## 2025-01-17 ASSESSMENT — PAIN DESCRIPTION - LOCATION: LOCATION: LEG

## 2025-01-17 ASSESSMENT — PAIN DESCRIPTION - FREQUENCY: FREQUENCY: INTERMITTENT

## 2025-01-17 ASSESSMENT — PAIN DESCRIPTION - ORIENTATION: ORIENTATION: RIGHT

## 2025-01-17 ASSESSMENT — PAIN DESCRIPTION - PAIN TYPE: TYPE: CHRONIC PAIN

## 2025-01-17 ASSESSMENT — PAIN DESCRIPTION - DESCRIPTORS: DESCRIPTORS: ACHING;TENDER

## 2025-01-17 ASSESSMENT — PAIN SCALES - GENERAL: PAINLEVEL_OUTOF10: 4

## 2025-01-17 ASSESSMENT — PAIN DESCRIPTION - ONSET: ONSET: ON-GOING

## 2025-01-17 ASSESSMENT — PAIN - FUNCTIONAL ASSESSMENT: PAIN_FUNCTIONAL_ASSESSMENT: ACTIVITIES ARE NOT PREVENTED

## 2025-01-17 NOTE — PLAN OF CARE
Discharge instructions given.  Patient verbalized understanding.  Return to [facility] in 1 week(s).  BVR for skin sub sent      
Leg Left;Posterior;Lower;Lateral #1 (Active)   Wound Image   01/03/25 0834   Wound Etiology Venous 01/17/25 0903   Wound Cleansed Wound cleanser 01/17/25 0903   Wound Length (cm) 1.4 cm 01/17/25 0903   Wound Width (cm) 2.3 cm 01/17/25 0903   Wound Depth (cm) 0.1 cm 01/17/25 0903   Wound Surface Area (cm^2) 3.22 cm^2 01/17/25 0903   Change in Wound Size % (l*w) 42.5 01/17/25 0903   Wound Volume (cm^3) 0.322 cm^3 01/17/25 0903   Wound Healing % 43 01/17/25 0903   Post-Procedure Length (cm) 1.4 cm 01/17/25 0927   Post-Procedure Width (cm) 2.3 cm 01/17/25 0927   Post-Procedure Depth (cm) 0.1 cm 01/17/25 0927   Post-Procedure Surface Area (cm^2) 3.22 cm^2 01/17/25 0927   Post-Procedure Volume (cm^3) 0.322 cm^3 01/17/25 0927   Wound Assessment Bleeding 01/17/25 0927   Drainage Amount Moderate (25-50%) 01/17/25 0903   Drainage Description Serosanguinous 01/17/25 0903   Odor None 01/17/25 0903   Maria De Jesus-wound Assessment Intact;Blanchable erythema 01/17/25 0903   Margins Defined edges 01/17/25 0903   Number of days: 84       Wound 01/03/25 Leg Right;Lower;Lateral #2 (Active)   Wound Image   01/03/25 0849   Wound Etiology Venous 01/17/25 0903   Wound Cleansed Wound cleanser 01/17/25 0903   Wound Length (cm) 0.9 cm 01/17/25 0903   Wound Width (cm) 0.8 cm 01/17/25 0903   Wound Depth (cm) 0.1 cm 01/17/25 0903   Wound Surface Area (cm^2) 0.72 cm^2 01/17/25 0903   Change in Wound Size % (l*w) -500 01/17/25 0903   Wound Volume (cm^3) 0.072 cm^3 01/17/25 0903   Wound Healing % -200 01/17/25 0903   Post-Procedure Length (cm) 0.9 cm 01/17/25 0927   Post-Procedure Width (cm) 0.8 cm 01/17/25 0927   Post-Procedure Depth (cm) 0.1 cm 01/17/25 0927   Post-Procedure Surface Area (cm^2) 0.72 cm^2 01/17/25 0927   Post-Procedure Volume (cm^3) 0.072 cm^3 01/17/25 0927   Wound Assessment Bleeding 01/17/25 0927   Drainage Amount Moderate (25-50%) 01/17/25 0903   Drainage Description Serosanguinous 01/17/25 0903   Odor None 01/17/25 0903   Maria De Jesus-wound

## 2025-01-17 NOTE — PROGRESS NOTES
[facility]  Progress Note and Procedure Note      Fani Mar  AGE: 49 y.o.   GENDER: female  : 1975  TODAY'S DATE:  2025    Subjective:     Chief Complaint   Patient presents with    Wound Check     BLE         HISTORY of PRESENT ILLNESS HPI     Fani Mar is a 49 y.o. female who presents today for wound evaluation.   History of Wound: Patient elects to having wound for at least 4 weeks on the back of her leg.  She states it is a venous stasis ulceration.      She has left the dressing intact.  She admits to scratching above both the wraps because she itches.  She has no other complaints at this time.  She denies current nausea, vomiting, fever, chills, shortness of breath or chest pain.    Wound Pain:  intermittent  Severity:  3 / 10   Wound Type:  venous  Modifying Factors:  edema, venous stasis, and lymphedema  Associated Signs/Symptoms:  edema, erythema, drainage, and odor        PAST MEDICAL HISTORY        Diagnosis Date    Anxiety     Depression     Hypertension     PCOS (polycystic ovarian syndrome)        PAST SURGICAL HISTORY    Past Surgical History:   Procedure Laterality Date    APPENDECTOMY      TONSILLECTOMY AND ADENOIDECTOMY      TYMPANOSTOMY TUBE PLACEMENT         FAMILY HISTORY    Family History   Problem Relation Age of Onset    Diabetes Mother     Dementia Mother     High Blood Pressure Father     Pancreatic Cancer Father     Cancer Father         Pancreatic    Coronary Art Dis Sister         Congestive heart failure with pulmonary hypertension    Pancreatic Cancer Sister         PULM HTN    Cancer Brother 34        Brain tumor       SOCIAL HISTORY    Social History     Tobacco Use    Smoking status: Never     Passive exposure: Never    Smokeless tobacco: Never   Substance Use Topics    Alcohol use: Yes     Comment: 1 glass on holidays/spec. occassions, avg < 1/month    Drug use: Never       ALLERGIES    No Known Allergies    MEDICATIONS    Current

## 2025-01-17 NOTE — PROGRESS NOTES
Multilayer Compression Wrap   Below the Knee    NAME:  Fani Mar  YOB: 1975  MEDICAL RECORD NUMBER:  [id number]  DATE:  1/17/2025    Multilayer compression wrap: Removed old Multilayer wrap if indicated and wash leg with mild soap/water.  Applied moisturizing agent to dry skin as needed.   Applied primary and secondary dressing as ordered.  Applied multilayered dressing below the knee to right lower leg.  Applied multilayered dressing below the knee to left lower leg.  Instructed patient/caregiver not to remove dressing and to keep it clean and dry.   Instructed patient/caregiver on complications to report to provider, such as pain, numbness in toes, heavy drainage, and slippage of dressing.  Instructed patient on purpose of compression dressing and on activity and exercise recommendations.     Applied  2 layer wrap and 6\" ace wrap from toes to knee overlapping each time    Electronically signed by Georgie Amador RN on 1/17/2025 at 9:56 AM

## 2025-01-19 DIAGNOSIS — I87.2 VENOUS INSUFFICIENCY OF BOTH LOWER EXTREMITIES: ICD-10-CM

## 2025-01-20 RX ORDER — FUROSEMIDE 20 MG/1
TABLET ORAL
Qty: 45 TABLET | Refills: 0 | Status: SHIPPED | OUTPATIENT
Start: 2025-01-20

## 2025-01-24 ENCOUNTER — HOSPITAL ENCOUNTER (OUTPATIENT)
Dept: WOUND CARE | Age: 50
Discharge: HOME | End: 2025-01-24
Attending: PODIATRIST
Payer: COMMERCIAL

## 2025-01-24 VITALS
SYSTOLIC BLOOD PRESSURE: 157 MMHG | DIASTOLIC BLOOD PRESSURE: 92 MMHG | TEMPERATURE: 97 F | HEART RATE: 89 BPM | RESPIRATION RATE: 16 BRPM

## 2025-01-24 DIAGNOSIS — R60.0 EDEMA LEG: ICD-10-CM

## 2025-01-24 DIAGNOSIS — I89.0 LYMPHEDEMA: ICD-10-CM

## 2025-01-24 DIAGNOSIS — I73.89 OTHER SPECIFIED PERIPHERAL VASCULAR DISEASES (HCC): Primary | ICD-10-CM

## 2025-01-24 DIAGNOSIS — L97.223 NON-PRESSURE CHRONIC ULCER OF LEFT CALF WITH NECROSIS OF MUSCLE (HCC): ICD-10-CM

## 2025-01-24 PROCEDURE — 29581 APPL MULTLAYER CMPRN SYS LEG: CPT

## 2025-01-24 PROCEDURE — 97597 DBRDMT OPN WND 1ST 20 CM/<: CPT

## 2025-01-24 PROCEDURE — 11042 DBRDMT SUBQ TIS 1ST 20SQCM/<: CPT

## 2025-01-24 RX ORDER — LIDOCAINE HYDROCHLORIDE 20 MG/ML
JELLY TOPICAL ONCE
OUTPATIENT
Start: 2025-01-24 | End: 2025-01-24

## 2025-01-24 RX ORDER — MUPIROCIN 20 MG/G
OINTMENT TOPICAL ONCE
OUTPATIENT
Start: 2025-01-24 | End: 2025-01-24

## 2025-01-24 RX ORDER — SILVER SULFADIAZINE 10 MG/G
CREAM TOPICAL ONCE
OUTPATIENT
Start: 2025-01-24 | End: 2025-01-24

## 2025-01-24 RX ORDER — BACITRACIN ZINC AND POLYMYXIN B SULFATE 500; 1000 [USP'U]/G; [USP'U]/G
OINTMENT TOPICAL ONCE
OUTPATIENT
Start: 2025-01-24 | End: 2025-01-24

## 2025-01-24 RX ORDER — LIDOCAINE 40 MG/G
CREAM TOPICAL ONCE
Status: DISCONTINUED | OUTPATIENT
Start: 2025-01-24 | End: 2025-01-25 | Stop reason: HOSPADM

## 2025-01-24 RX ORDER — SODIUM CHLOR/HYPOCHLOROUS ACID 0.033 %
SOLUTION, IRRIGATION IRRIGATION ONCE
OUTPATIENT
Start: 2025-01-24 | End: 2025-01-24

## 2025-01-24 RX ORDER — LIDOCAINE 50 MG/G
OINTMENT TOPICAL ONCE
OUTPATIENT
Start: 2025-01-24 | End: 2025-01-24

## 2025-01-24 RX ORDER — CLOBETASOL PROPIONATE 0.5 MG/G
OINTMENT TOPICAL ONCE
OUTPATIENT
Start: 2025-01-24 | End: 2025-01-24

## 2025-01-24 RX ORDER — GINSENG 100 MG
CAPSULE ORAL ONCE
OUTPATIENT
Start: 2025-01-24 | End: 2025-01-24

## 2025-01-24 RX ORDER — BETAMETHASONE DIPROPIONATE 0.5 MG/G
CREAM TOPICAL ONCE
OUTPATIENT
Start: 2025-01-24 | End: 2025-01-24

## 2025-01-24 RX ORDER — NEOMYCIN/BACITRACIN/POLYMYXINB 3.5-400-5K
OINTMENT (GRAM) TOPICAL ONCE
OUTPATIENT
Start: 2025-01-24 | End: 2025-01-24

## 2025-01-24 RX ORDER — GENTAMICIN SULFATE 1 MG/G
OINTMENT TOPICAL ONCE
OUTPATIENT
Start: 2025-01-24 | End: 2025-01-24

## 2025-01-24 RX ORDER — TRIAMCINOLONE ACETONIDE 1 MG/G
OINTMENT TOPICAL ONCE
OUTPATIENT
Start: 2025-01-24 | End: 2025-01-24

## 2025-01-24 RX ORDER — LIDOCAINE 40 MG/G
CREAM TOPICAL ONCE
OUTPATIENT
Start: 2025-01-24 | End: 2025-01-24

## 2025-01-24 RX ORDER — LIDOCAINE HYDROCHLORIDE 40 MG/ML
SOLUTION TOPICAL ONCE
OUTPATIENT
Start: 2025-01-24 | End: 2025-01-24

## 2025-01-24 ASSESSMENT — PAIN DESCRIPTION - DESCRIPTORS: DESCRIPTORS: ACHING

## 2025-01-24 ASSESSMENT — PAIN DESCRIPTION - FREQUENCY: FREQUENCY: INTERMITTENT

## 2025-01-24 ASSESSMENT — PAIN - FUNCTIONAL ASSESSMENT: PAIN_FUNCTIONAL_ASSESSMENT: ACTIVITIES ARE NOT PREVENTED

## 2025-01-24 ASSESSMENT — PAIN DESCRIPTION - LOCATION: LOCATION: LEG

## 2025-01-24 ASSESSMENT — PAIN SCALES - GENERAL: PAINLEVEL_OUTOF10: 2

## 2025-01-24 ASSESSMENT — PAIN DESCRIPTION - ORIENTATION: ORIENTATION: RIGHT

## 2025-01-24 ASSESSMENT — PAIN DESCRIPTION - ONSET: ONSET: ON-GOING

## 2025-01-24 ASSESSMENT — PAIN DESCRIPTION - PAIN TYPE: TYPE: CHRONIC PAIN

## 2025-01-24 NOTE — PATIENT INSTRUCTIONS
Lotrisone to leg, Gent and epiona Optilock. Keep wound to upper part of pad not center, coflex calamine. 6 inch ace wrap. Keep leg elevated when sitting.  These wraps will be changed at your next visit. If you have any complications with the wraps( if they become wet, start to slide down, if the wraps become too tight) please call the office so that we can give you further direction    Compression Wraps  Location: Right and left leg  Type: Coflex calamine    Your doctor has ordered compression therapy for your wound. Compression bandages reduce the swelling, or edema, in your legs and prevent it from returning. The wound care staff will apply your compression wrap. It must be removed and re-applied at least weekly. As the swelling decreases, the boot no longer provides adequate compression and you need a new one. Once applied, you need to know how to take care of your compression wrap.     The boot must stay dry. Do not get it wet in the shower or tub. You may do a partial bath, or you can cover the boot with a large plastic bag, secured at the top, so that no water can get in.    Avoid standing in one place for long periods of time. If you must  one place, shift your weight and change positions often.    If you have CHF, consult your doctor before following the next two recommendations for leg elevation.     When sitting, elevate your legs on pillows, or put blocks under the foot of your bed. Your legs should be higher than your heart.    If your boot becomes painful, or you notice an increase in swelling in your toes, numbness or tingling, or purple color to your toes, remove the wrap and call the Wound Care Center. If it is after hours, call your doctor for instructions or go to the nearest emergency room.      Home Care Agency/Facility:     Your wound-care supplies will be provided by:   Please note, depending on your insurance coverage, you may have out-of-pocket expenses for these supplies. Someone

## 2025-01-24 NOTE — PLAN OF CARE
Multilayer Compression Wrap   Below the Knee    NAME:  Fani Mar  YOB: 1975  MEDICAL RECORD NUMBER:  [id number]  DATE:  1/24/2025    Multilayer compression wrap: Applied moisturizing agent to dry skin as needed.   Applied primary and secondary dressing as ordered.  Applied multilayered dressing below the knee to right lower leg.  Applied multilayered dressing below the knee to left lower leg.  Instructed patient/caregiver not to remove dressing and to keep it clean and dry.   Instructed patient/caregiver on complications to report to provider, such as pain, numbness in toes, heavy drainage, and slippage of dressing.  Instructed patient on purpose of compression dressing and on activity and exercise recommendations.     Applied  2 layer wrap from toes to knee overlapping each time    Electronically signed by Angela Ramesh RN on 1/24/2025 at 9:59 AM

## 2025-01-24 NOTE — PROGRESS NOTES
[facility]  Progress Note and Procedure Note      Fani Mar  AGE: 49 y.o.   GENDER: female  : 1975  TODAY'S DATE:  2025    Subjective:     Chief Complaint   Patient presents with    Wound Check     BLE         HISTORY of PRESENT ILLNESS HPI     Fani Mar is a 49 y.o. female who presents today for wound evaluation.   History of Wound: Patient elects to having wound for at least 4 weeks on the back of her leg.  She states it is a venous stasis ulceration.      She has left the dressing intact.  She admits to scratching above both the wraps because she itches.  She has no other complaints at this time.  She denies current nausea, vomiting, fever, chills, shortness of breath or chest pain.    Wound Pain:  intermittent  Severity:  3 / 10   Wound Type:  venous  Modifying Factors:  edema, venous stasis, and lymphedema  Associated Signs/Symptoms:  edema, erythema, drainage, and odor        PAST MEDICAL HISTORY        Diagnosis Date    Anxiety     Depression     Hypertension     PCOS (polycystic ovarian syndrome)        PAST SURGICAL HISTORY    Past Surgical History:   Procedure Laterality Date    APPENDECTOMY      TONSILLECTOMY AND ADENOIDECTOMY      TYMPANOSTOMY TUBE PLACEMENT         FAMILY HISTORY    Family History   Problem Relation Age of Onset    Diabetes Mother     Dementia Mother     High Blood Pressure Father     Pancreatic Cancer Father     Cancer Father         Pancreatic    Coronary Art Dis Sister         Congestive heart failure with pulmonary hypertension    Pancreatic Cancer Sister         PULM HTN    Cancer Brother 34        Brain tumor       SOCIAL HISTORY    Social History     Tobacco Use    Smoking status: Never     Passive exposure: Never    Smokeless tobacco: Never   Substance Use Topics    Alcohol use: Yes     Comment: 1 glass on holidays/spec. occassions, avg < 1/month    Drug use: Never       ALLERGIES    No Known Allergies    MEDICATIONS    Current

## 2025-01-30 NOTE — PATIENT INSTRUCTIONS
Right leg - Triad and gent, 4x4 Coflex calamine Left leg -, Gent and Hydroferra blue cut to size, Optilock. Keep wound to upper part of pad not center, coflex calamine. 6 inch ace wrap. Keep leg elevated when sitting.  These wraps will be changed at your next visit. If you have any complications with the wraps( if they become wet, start to slide down, if the wraps become too tight) please call the office so that we can give you further direction    Compression Wraps  Location: Right and left leg  Type: Coflex calamine    Your doctor has ordered compression therapy for your wound. Compression bandages reduce the swelling, or edema, in your legs and prevent it from returning. The wound care staff will apply your compression wrap. It must be removed and re-applied at least weekly. As the swelling decreases, the boot no longer provides adequate compression and you need a new one. Once applied, you need to know how to take care of your compression wrap.     The boot must stay dry. Do not get it wet in the shower or tub. You may do a partial bath, or you can cover the boot with a large plastic bag, secured at the top, so that no water can get in.    Avoid standing in one place for long periods of time. If you must  one place, shift your weight and change positions often.    If you have CHF, consult your doctor before following the next two recommendations for leg elevation.     When sitting, elevate your legs on pillows, or put blocks under the foot of your bed. Your legs should be higher than your heart.    If your boot becomes painful, or you notice an increase in swelling in your toes, numbness or tingling, or purple color to your toes, remove the wrap and call the Wound Care Center. If it is after hours, call your doctor for instructions or go to the nearest emergency room.      Home Care Agency/Facility:     Your wound-care supplies will be provided by:   Please note, depending on your insurance coverage, you

## 2025-01-31 ENCOUNTER — HOSPITAL ENCOUNTER (OUTPATIENT)
Dept: WOUND CARE | Age: 50
Discharge: HOME | End: 2025-01-31
Attending: PODIATRIST
Payer: COMMERCIAL

## 2025-01-31 VITALS
SYSTOLIC BLOOD PRESSURE: 178 MMHG | TEMPERATURE: 97.7 F | DIASTOLIC BLOOD PRESSURE: 92 MMHG | RESPIRATION RATE: 18 BRPM | HEART RATE: 95 BPM

## 2025-01-31 DIAGNOSIS — I89.0 LYMPHEDEMA: ICD-10-CM

## 2025-01-31 DIAGNOSIS — L97.223 NON-PRESSURE CHRONIC ULCER OF LEFT CALF WITH NECROSIS OF MUSCLE (HCC): ICD-10-CM

## 2025-01-31 DIAGNOSIS — I73.89 OTHER SPECIFIED PERIPHERAL VASCULAR DISEASES (HCC): Primary | ICD-10-CM

## 2025-01-31 DIAGNOSIS — R60.0 EDEMA LEG: ICD-10-CM

## 2025-01-31 PROCEDURE — 11043 DBRDMT MUSC&/FSCA 1ST 20/<: CPT

## 2025-01-31 PROCEDURE — 29581 APPL MULTLAYER CMPRN SYS LEG: CPT

## 2025-01-31 PROCEDURE — 11042 DBRDMT SUBQ TIS 1ST 20SQCM/<: CPT

## 2025-01-31 RX ORDER — LIDOCAINE HYDROCHLORIDE 20 MG/ML
JELLY TOPICAL ONCE
OUTPATIENT
Start: 2025-01-31 | End: 2025-01-31

## 2025-01-31 RX ORDER — SILVER SULFADIAZINE 10 MG/G
CREAM TOPICAL ONCE
OUTPATIENT
Start: 2025-01-31 | End: 2025-01-31

## 2025-01-31 RX ORDER — GENTAMICIN SULFATE 1 MG/G
OINTMENT TOPICAL ONCE
OUTPATIENT
Start: 2025-01-31 | End: 2025-01-31

## 2025-01-31 RX ORDER — LIDOCAINE HYDROCHLORIDE 40 MG/ML
SOLUTION TOPICAL ONCE
OUTPATIENT
Start: 2025-01-31 | End: 2025-01-31

## 2025-01-31 RX ORDER — LIDOCAINE 40 MG/G
CREAM TOPICAL ONCE
Status: DISCONTINUED | OUTPATIENT
Start: 2025-01-31 | End: 2025-02-01 | Stop reason: HOSPADM

## 2025-01-31 RX ORDER — CLOBETASOL PROPIONATE 0.5 MG/G
OINTMENT TOPICAL ONCE
OUTPATIENT
Start: 2025-01-31 | End: 2025-01-31

## 2025-01-31 RX ORDER — LIDOCAINE 50 MG/G
OINTMENT TOPICAL ONCE
OUTPATIENT
Start: 2025-01-31 | End: 2025-01-31

## 2025-01-31 RX ORDER — GINSENG 100 MG
CAPSULE ORAL ONCE
OUTPATIENT
Start: 2025-01-31 | End: 2025-01-31

## 2025-01-31 RX ORDER — MUPIROCIN 20 MG/G
OINTMENT TOPICAL ONCE
OUTPATIENT
Start: 2025-01-31 | End: 2025-01-31

## 2025-01-31 RX ORDER — LIDOCAINE 40 MG/G
CREAM TOPICAL ONCE
OUTPATIENT
Start: 2025-01-31 | End: 2025-01-31

## 2025-01-31 RX ORDER — TRIAMCINOLONE ACETONIDE 1 MG/G
OINTMENT TOPICAL ONCE
OUTPATIENT
Start: 2025-01-31 | End: 2025-01-31

## 2025-01-31 RX ORDER — BETAMETHASONE DIPROPIONATE 0.5 MG/G
CREAM TOPICAL ONCE
OUTPATIENT
Start: 2025-01-31 | End: 2025-01-31

## 2025-01-31 RX ORDER — BACITRACIN ZINC AND POLYMYXIN B SULFATE 500; 1000 [USP'U]/G; [USP'U]/G
OINTMENT TOPICAL ONCE
OUTPATIENT
Start: 2025-01-31 | End: 2025-01-31

## 2025-01-31 RX ORDER — SODIUM CHLOR/HYPOCHLOROUS ACID 0.033 %
SOLUTION, IRRIGATION IRRIGATION ONCE
OUTPATIENT
Start: 2025-01-31 | End: 2025-01-31

## 2025-01-31 RX ORDER — NEOMYCIN/BACITRACIN/POLYMYXINB 3.5-400-5K
OINTMENT (GRAM) TOPICAL ONCE
OUTPATIENT
Start: 2025-01-31 | End: 2025-01-31

## 2025-01-31 ASSESSMENT — PAIN DESCRIPTION - ORIENTATION: ORIENTATION: RIGHT

## 2025-01-31 ASSESSMENT — PAIN SCALES - GENERAL: PAINLEVEL_OUTOF10: 2

## 2025-01-31 ASSESSMENT — PAIN DESCRIPTION - LOCATION: LOCATION: LEG

## 2025-01-31 ASSESSMENT — PAIN DESCRIPTION - ONSET: ONSET: ON-GOING

## 2025-01-31 ASSESSMENT — PAIN DESCRIPTION - DESCRIPTORS: DESCRIPTORS: NAGGING

## 2025-01-31 ASSESSMENT — PAIN - FUNCTIONAL ASSESSMENT: PAIN_FUNCTIONAL_ASSESSMENT: ACTIVITIES ARE NOT PREVENTED

## 2025-01-31 ASSESSMENT — PAIN DESCRIPTION - PAIN TYPE: TYPE: CHRONIC PAIN

## 2025-01-31 ASSESSMENT — PAIN DESCRIPTION - FREQUENCY: FREQUENCY: INTERMITTENT

## 2025-01-31 NOTE — PROGRESS NOTES
Multilayer Compression Wrap   Below the Knee    NAME:  Fani Mar  YOB: 1975  MEDICAL RECORD NUMBER:  [id number]  DATE:  1/31/2025    Multilayer compression wrap: Removed old Multilayer wrap if indicated and wash leg with mild soap/water.  Applied moisturizing agent to dry skin as needed.   Applied primary and secondary dressing as ordered.  Applied multilayered dressing below the knee to right lower leg.  Applied multilayered dressing below the knee to left lower leg.  Instructed patient/caregiver not to remove dressing and to keep it clean and dry.   Instructed patient/caregiver on complications to report to provider, such as pain, numbness in toes, heavy drainage, and slippage of dressing.  Instructed patient on purpose of compression dressing and on activity and exercise recommendations.     Applied  2 layer wrap from toes to knee overlapping each time    Electronically signed by ISABELLE LAZO RN on 1/31/2025 at 9:51 AM

## 2025-01-31 NOTE — PROGRESS NOTES
[facility]  Progress Note and Procedure Note      Fani Mar  AGE: 49 y.o.   GENDER: female  : 1975  TODAY'S DATE:  2025    Subjective:     Chief Complaint   Patient presents with    Wound Check     BLE         HISTORY of PRESENT ILLNESS HPI     Fani Mar is a 49 y.o. female who presents today for wound evaluation.   History of Wound: Patient elects to having wound for at least 4 weeks on the back of her leg.  She states it is a venous stasis ulceration.      She has left the dressing intact.  She admits to scratching above both the wraps because she itches.  She has no other complaints at this time.  She denies current nausea, vomiting, fever, chills, shortness of breath or chest pain.    Wound Pain:  intermittent  Severity:  3 / 10   Wound Type:  venous  Modifying Factors:  edema, venous stasis, and lymphedema  Associated Signs/Symptoms:  edema, erythema, drainage, and odor        PAST MEDICAL HISTORY        Diagnosis Date    Anxiety     Depression     Hypertension     PCOS (polycystic ovarian syndrome)        PAST SURGICAL HISTORY    Past Surgical History:   Procedure Laterality Date    APPENDECTOMY      TONSILLECTOMY AND ADENOIDECTOMY      TYMPANOSTOMY TUBE PLACEMENT         FAMILY HISTORY    Family History   Problem Relation Age of Onset    Diabetes Mother     Dementia Mother     High Blood Pressure Father     Pancreatic Cancer Father     Cancer Father         Pancreatic    Coronary Art Dis Sister         Congestive heart failure with pulmonary hypertension    Pancreatic Cancer Sister         PULM HTN    Cancer Brother 34        Brain tumor       SOCIAL HISTORY    Social History     Tobacco Use    Smoking status: Never     Passive exposure: Never    Smokeless tobacco: Never   Substance Use Topics    Alcohol use: Yes     Comment: 1 glass on holidays/spec. occassions, avg < 1/month    Drug use: Never       ALLERGIES    No Known Allergies    MEDICATIONS    Current

## 2025-01-31 NOTE — PLAN OF CARE
Discharge instructions given.  Patient verbalized understanding.  Return to [facility] in 1 week(s).

## 2025-02-04 NOTE — PATIENT INSTRUCTIONS
[facility]  [address]   [place], [place] [address]  Telephone: [phone]     FAX [phone]    Discharge Instructions    Important reminders:    **If you have any signs and symptoms of illness (Cough, fever, congestion, nausea, vomiting, diarrhea, etc.) please call the wound care center prior to your appointment.    1. Increase Protein intake for optimal wound healing  2. No added salt to reduce any swelling  3. If diabetic, maintain good glucose control  4. If you smoke, smoking prohibits wound healing, we ask that you refrain from smoking.  5. When taking antibiotics take the entire prescription as ordered. Do not stop taking until medication is all gone unless otherwise instructed.   6. Exercise as tolerated.   7. Keep weight off wounds and reposition every 2 hours if applicable.  8. If wound(s) is on your lower extremity, elevate legs to the level of the heart or above for 30 minutes 4-5 times a day and/or when sitting. Avoid standing for long periods of time.   9. Do not get wounds wet in bath or shower unless otherwise instructed by your physician. If your wound is on your foot or leg, you may purchase a cast bag. Please ask at the pharmacy.      If Vascular testing is ordered, please call [phone] to schedule.    Vascular tests ordered by Wound Care Physicians may take up to 2 hours to complete. Please keep that in mind when scheduling.     If Vascular testing is scheduled, please bring supplies to replace your dressing after testing is done. The vascular department does not stock supplies.     Wound: Left leg     With each dressing change, rinse wounds with 0.9% Saline. (May use wound wash or soft contact solution. Both can be purchased at a local drug store). If unable to obtain saline, may use a gentle soap and water.    Dressing care: Look at [facility] for a new recliner. Take diuretic as ordered. Do not get leg wet in shower. Can get a cast back.   Lotrisone to legs.

## 2025-02-07 ENCOUNTER — HOSPITAL ENCOUNTER (OUTPATIENT)
Dept: WOUND CARE | Age: 50
Discharge: HOME | End: 2025-02-07
Attending: PODIATRIST
Payer: COMMERCIAL

## 2025-02-07 VITALS — DIASTOLIC BLOOD PRESSURE: 95 MMHG | SYSTOLIC BLOOD PRESSURE: 164 MMHG | RESPIRATION RATE: 16 BRPM | HEART RATE: 89 BPM

## 2025-02-07 DIAGNOSIS — L97.223 NON-PRESSURE CHRONIC ULCER OF LEFT CALF WITH NECROSIS OF MUSCLE (HCC): ICD-10-CM

## 2025-02-07 DIAGNOSIS — I89.0 LYMPHEDEMA: ICD-10-CM

## 2025-02-07 DIAGNOSIS — I73.89 OTHER SPECIFIED PERIPHERAL VASCULAR DISEASES (HCC): Primary | ICD-10-CM

## 2025-02-07 DIAGNOSIS — R60.0 EDEMA LEG: ICD-10-CM

## 2025-02-07 PROCEDURE — 11042 DBRDMT SUBQ TIS 1ST 20SQCM/<: CPT

## 2025-02-07 PROCEDURE — 29581 APPL MULTLAYER CMPRN SYS LEG: CPT

## 2025-02-07 RX ORDER — LIDOCAINE HYDROCHLORIDE 20 MG/ML
JELLY TOPICAL ONCE
OUTPATIENT
Start: 2025-02-07 | End: 2025-02-07

## 2025-02-07 RX ORDER — LIDOCAINE HYDROCHLORIDE 40 MG/ML
SOLUTION TOPICAL ONCE
OUTPATIENT
Start: 2025-02-07 | End: 2025-02-07

## 2025-02-07 RX ORDER — GINSENG 100 MG
CAPSULE ORAL ONCE
OUTPATIENT
Start: 2025-02-07 | End: 2025-02-07

## 2025-02-07 RX ORDER — MUPIROCIN 20 MG/G
OINTMENT TOPICAL ONCE
OUTPATIENT
Start: 2025-02-07 | End: 2025-02-07

## 2025-02-07 RX ORDER — TRIAMCINOLONE ACETONIDE 1 MG/G
OINTMENT TOPICAL ONCE
OUTPATIENT
Start: 2025-02-07 | End: 2025-02-07

## 2025-02-07 RX ORDER — SILVER SULFADIAZINE 10 MG/G
CREAM TOPICAL ONCE
OUTPATIENT
Start: 2025-02-07 | End: 2025-02-07

## 2025-02-07 RX ORDER — CLOBETASOL PROPIONATE 0.5 MG/G
OINTMENT TOPICAL ONCE
OUTPATIENT
Start: 2025-02-07 | End: 2025-02-07

## 2025-02-07 RX ORDER — BETAMETHASONE DIPROPIONATE 0.5 MG/G
CREAM TOPICAL ONCE
OUTPATIENT
Start: 2025-02-07 | End: 2025-02-07

## 2025-02-07 RX ORDER — LIDOCAINE 40 MG/G
CREAM TOPICAL ONCE
Status: DISCONTINUED | OUTPATIENT
Start: 2025-02-07 | End: 2025-02-08 | Stop reason: HOSPADM

## 2025-02-07 RX ORDER — LIDOCAINE 40 MG/G
CREAM TOPICAL ONCE
OUTPATIENT
Start: 2025-02-07 | End: 2025-02-07

## 2025-02-07 RX ORDER — LIDOCAINE 50 MG/G
OINTMENT TOPICAL ONCE
OUTPATIENT
Start: 2025-02-07 | End: 2025-02-07

## 2025-02-07 RX ORDER — GENTAMICIN SULFATE 1 MG/G
OINTMENT TOPICAL ONCE
OUTPATIENT
Start: 2025-02-07 | End: 2025-02-07

## 2025-02-07 RX ORDER — SODIUM CHLOR/HYPOCHLOROUS ACID 0.033 %
SOLUTION, IRRIGATION IRRIGATION ONCE
OUTPATIENT
Start: 2025-02-07 | End: 2025-02-07

## 2025-02-07 RX ORDER — BACITRACIN ZINC AND POLYMYXIN B SULFATE 500; 1000 [USP'U]/G; [USP'U]/G
OINTMENT TOPICAL ONCE
OUTPATIENT
Start: 2025-02-07 | End: 2025-02-07

## 2025-02-07 RX ORDER — NEOMYCIN/BACITRACIN/POLYMYXINB 3.5-400-5K
OINTMENT (GRAM) TOPICAL ONCE
OUTPATIENT
Start: 2025-02-07 | End: 2025-02-07

## 2025-02-07 NOTE — PROGRESS NOTES
Multilayer Compression Wrap   Below the Knee    NAME:  Fani Mar  YOB: 1975  MEDICAL RECORD NUMBER:  [id number]  DATE:  2/7/2025    Multilayer compression wrap: Removed old Multilayer wrap if indicated and wash leg with mild soap/water.  Applied primary and secondary dressing as ordered.  Applied multilayered dressing below the knee to right lower leg.  Applied multilayered dressing below the knee to left lower leg.  Instructed patient/caregiver not to remove dressing and to keep it clean and dry.   Instructed patient/caregiver on complications to report to provider, such as pain, numbness in toes, heavy drainage, and slippage of dressing.  Instructed patient on purpose of compression dressing and on activity and exercise recommendations.     Applied  2 layer wrap from toes to knee overlapping each time    Electronically signed by GILL JIMENEZ RN on 2/7/2025 at 9:57 AM

## 2025-02-07 NOTE — PROGRESS NOTES
[facility]  Progress Note and Procedure Note      Fani Mar  AGE: 49 y.o.   GENDER: female  : 1975  TODAY'S DATE:  2025    Subjective:     Chief Complaint   Patient presents with    Wound Check     Right lower leg, Left lower leg         HISTORY of PRESENT ILLNESS HPI     Fani Mar is a 49 y.o. female who presents today for wound evaluation.   History of Wound: Patient elects to having wound for at least 4 weeks on the back of her leg.  She states it is a venous stasis ulceration.      She has left the dressing intact.  She feels that stress is making her itch her legs.  She has no other complaints at this time.  She denies current nausea, vomiting, fever, chills, shortness of breath or chest pain.    Wound Pain:  intermittent  Severity:  3 / 10   Wound Type:  venous  Modifying Factors:  edema, venous stasis, and lymphedema  Associated Signs/Symptoms:  edema, erythema, drainage, and odor        PAST MEDICAL HISTORY        Diagnosis Date    Anxiety     Depression     Hypertension     PCOS (polycystic ovarian syndrome)        PAST SURGICAL HISTORY    Past Surgical History:   Procedure Laterality Date    APPENDECTOMY      TONSILLECTOMY AND ADENOIDECTOMY      TYMPANOSTOMY TUBE PLACEMENT         FAMILY HISTORY    Family History   Problem Relation Age of Onset    Diabetes Mother     Dementia Mother     High Blood Pressure Father     Pancreatic Cancer Father     Cancer Father         Pancreatic    Coronary Art Dis Sister         Congestive heart failure with pulmonary hypertension    Pancreatic Cancer Sister         PULM HTN    Cancer Brother 34        Brain tumor       SOCIAL HISTORY    Social History     Tobacco Use    Smoking status: Never     Passive exposure: Never    Smokeless tobacco: Never   Substance Use Topics    Alcohol use: Yes     Comment: 1 glass on holidays/spec. occassions, avg < 1/month    Drug use: Never       ALLERGIES    No Known

## 2025-02-07 NOTE — PLAN OF CARE
Discharge instructions given.  Patient verbalized understanding.  Return to [facility] in 1 week(s).

## 2025-02-13 NOTE — PATIENT INSTRUCTIONS
insurance coverage, you may have out-of-pocket expenses for these supplies. Someone from the company should call you to confirm your order and discuss those potential costs before they ship your products -- please anticipate that call. If your out-of-pocket cost could be substantial, Many companies have financial hardship programs for patients who qualify, so please ask about that if you might need a hand. If you have any questions about your supplies or your potential out-of-pocket costs, or if you need to place an order for a refill of supplies (typically monthly), please call the company directly.     Your  is Angela    Follow up with Dr Carreno In 1 week(s) in the wound care center.     Wound Care Center Information: Should you experience any significant changes in your wound(s) or have questions about your wound care, please contact the [facility] at [phone] Monday  - Thursday 8:00 am - 4:00 pm and Friday 8:00 am - 1:00pm. If you need help with your wound outside these hours and cannot wait until we are again available, contact your PCP or go to the hospital emergency room.     PLEASE NOTE: IF YOU ARE UNABLE TO OBTAIN WOUND SUPPLIES, CONTINUE TO USE THE SUPPLIES YOU HAVE AVAILABLE UNTIL YOU ARE ABLE TO REACH US. IT IS MOST IMPORTANT TO KEEP THE WOUND COVERED AT ALL TIMES.    Patient Experience    Thank you for trusting us with your care.  You may receive a survey from a company called [employer] asking for your feedback.  We would appreciate it if you took a few minutes to share your experience.  Your input is very valuable to us.

## 2025-02-14 ENCOUNTER — HOSPITAL ENCOUNTER (OUTPATIENT)
Dept: WOUND CARE | Age: 50
Discharge: HOME | End: 2025-02-14
Attending: PODIATRIST
Payer: COMMERCIAL

## 2025-02-14 VITALS
RESPIRATION RATE: 18 BRPM | DIASTOLIC BLOOD PRESSURE: 83 MMHG | TEMPERATURE: 96.6 F | SYSTOLIC BLOOD PRESSURE: 152 MMHG | HEART RATE: 79 BPM

## 2025-02-14 DIAGNOSIS — R60.0 EDEMA LEG: ICD-10-CM

## 2025-02-14 DIAGNOSIS — L97.223 NON-PRESSURE CHRONIC ULCER OF LEFT CALF WITH NECROSIS OF MUSCLE (HCC): ICD-10-CM

## 2025-02-14 DIAGNOSIS — I73.89 OTHER SPECIFIED PERIPHERAL VASCULAR DISEASES (HCC): Primary | ICD-10-CM

## 2025-02-14 DIAGNOSIS — I89.0 LYMPHEDEMA: ICD-10-CM

## 2025-02-14 PROCEDURE — 29581 APPL MULTLAYER CMPRN SYS LEG: CPT

## 2025-02-14 PROCEDURE — 11042 DBRDMT SUBQ TIS 1ST 20SQCM/<: CPT

## 2025-02-14 PROCEDURE — 97597 DBRDMT OPN WND 1ST 20 CM/<: CPT

## 2025-02-14 RX ORDER — TRIAMCINOLONE ACETONIDE 1 MG/G
OINTMENT TOPICAL ONCE
OUTPATIENT
Start: 2025-02-14 | End: 2025-02-14

## 2025-02-14 RX ORDER — LIDOCAINE HYDROCHLORIDE 20 MG/ML
JELLY TOPICAL ONCE
OUTPATIENT
Start: 2025-02-14 | End: 2025-02-14

## 2025-02-14 RX ORDER — GENTAMICIN SULFATE 1 MG/G
OINTMENT TOPICAL ONCE
OUTPATIENT
Start: 2025-02-14 | End: 2025-02-14

## 2025-02-14 RX ORDER — BACITRACIN ZINC AND POLYMYXIN B SULFATE 500; 1000 [USP'U]/G; [USP'U]/G
OINTMENT TOPICAL ONCE
OUTPATIENT
Start: 2025-02-14 | End: 2025-02-14

## 2025-02-14 RX ORDER — SODIUM CHLOR/HYPOCHLOROUS ACID 0.033 %
SOLUTION, IRRIGATION IRRIGATION ONCE
OUTPATIENT
Start: 2025-02-14 | End: 2025-02-14

## 2025-02-14 RX ORDER — BETAMETHASONE DIPROPIONATE 0.5 MG/G
CREAM TOPICAL ONCE
OUTPATIENT
Start: 2025-02-14 | End: 2025-02-14

## 2025-02-14 RX ORDER — LIDOCAINE HYDROCHLORIDE 40 MG/ML
SOLUTION TOPICAL ONCE
OUTPATIENT
Start: 2025-02-14 | End: 2025-02-14

## 2025-02-14 RX ORDER — LIDOCAINE 50 MG/G
OINTMENT TOPICAL ONCE
OUTPATIENT
Start: 2025-02-14 | End: 2025-02-14

## 2025-02-14 RX ORDER — LIDOCAINE 40 MG/G
CREAM TOPICAL ONCE
Status: DISCONTINUED | OUTPATIENT
Start: 2025-02-14 | End: 2025-02-15 | Stop reason: HOSPADM

## 2025-02-14 RX ORDER — SILVER SULFADIAZINE 10 MG/G
CREAM TOPICAL ONCE
OUTPATIENT
Start: 2025-02-14 | End: 2025-02-14

## 2025-02-14 RX ORDER — NEOMYCIN/BACITRACIN/POLYMYXINB 3.5-400-5K
OINTMENT (GRAM) TOPICAL ONCE
OUTPATIENT
Start: 2025-02-14 | End: 2025-02-14

## 2025-02-14 RX ORDER — MUPIROCIN 20 MG/G
OINTMENT TOPICAL ONCE
OUTPATIENT
Start: 2025-02-14 | End: 2025-02-14

## 2025-02-14 RX ORDER — CLOBETASOL PROPIONATE 0.5 MG/G
OINTMENT TOPICAL ONCE
OUTPATIENT
Start: 2025-02-14 | End: 2025-02-14

## 2025-02-14 RX ORDER — GINSENG 100 MG
CAPSULE ORAL ONCE
OUTPATIENT
Start: 2025-02-14 | End: 2025-02-14

## 2025-02-14 RX ORDER — LIDOCAINE 40 MG/G
CREAM TOPICAL ONCE
OUTPATIENT
Start: 2025-02-14 | End: 2025-02-14

## 2025-02-14 NOTE — PROGRESS NOTES
Multilayer Compression Wrap   Below the Knee    NAME:  Fani Mar  YOB: 1975  MEDICAL RECORD NUMBER:  [id number]  DATE:  2/14/2025    Multilayer compression wrap: Removed old Multilayer wrap if indicated and wash leg with mild soap/water.  Applied moisturizing agent to dry skin as needed.   Applied primary and secondary dressing as ordered.  Applied multilayered dressing below the knee to right lower leg.  Applied multilayered dressing below the knee to left lower leg.  Instructed patient/caregiver not to remove dressing and to keep it clean and dry.   Instructed patient/caregiver on complications to report to provider, such as pain, numbness in toes, heavy drainage, and slippage of dressing.  Instructed patient on purpose of compression dressing and on activity and exercise recommendations.     Applied  2 layer wrap and ace wrap from toes to knee overlapping each time    Electronically signed by Georgie Amador RN on 2/14/2025 at 11:56 AM

## 2025-02-14 NOTE — PROGRESS NOTES
[facility]  Progress Note and Procedure Note      Fani Mar  AGE: 49 y.o.   GENDER: female  : 1975  TODAY'S DATE:  2025    Subjective:     Chief Complaint   Patient presents with    Wound Check     BLE         HISTORY of PRESENT ILLNESS HPI     Fani Mar is a 49 y.o. female who presents today for wound evaluation.   History of Wound: Patient elects to having wound for at least 4 weeks on the back of her leg.  She states it is a venous stasis ulceration.      She has left the dressing intact.  She feels that stress is making her itch her legs.  She has no other complaints at this time.  She denies current nausea, vomiting, fever, chills, shortness of breath or chest pain.    Wound Pain:  intermittent  Severity:  3 / 10   Wound Type:  venous  Modifying Factors:  edema, venous stasis, and lymphedema  Associated Signs/Symptoms:  edema, erythema, drainage, and odor        PAST MEDICAL HISTORY        Diagnosis Date    Anxiety     Depression     Hypertension     PCOS (polycystic ovarian syndrome)        PAST SURGICAL HISTORY    Past Surgical History:   Procedure Laterality Date    APPENDECTOMY      TONSILLECTOMY AND ADENOIDECTOMY      TYMPANOSTOMY TUBE PLACEMENT         FAMILY HISTORY    Family History   Problem Relation Age of Onset    Diabetes Mother     Dementia Mother     High Blood Pressure Father     Pancreatic Cancer Father     Cancer Father         Pancreatic    Coronary Art Dis Sister         Congestive heart failure with pulmonary hypertension    Pancreatic Cancer Sister         PULM HTN    Cancer Brother 34        Brain tumor       SOCIAL HISTORY    Social History     Tobacco Use    Smoking status: Never     Passive exposure: Never    Smokeless tobacco: Never   Substance Use Topics    Alcohol use: Yes     Comment: 1 glass on holidays/spec. occassions, avg < 1/month    Drug use: Never       ALLERGIES    No Known Allergies    MEDICATIONS    Current Outpatient

## 2025-02-20 NOTE — PATIENT INSTRUCTIONS
[facility]  [address]   [place], [place] [address]  Telephone: [phone]     FAX [phone]    Discharge Instructions    Important reminders:    **If you have any signs and symptoms of illness (Cough, fever, congestion, nausea, vomiting, diarrhea, etc.) please call the wound care center prior to your appointment.    1. Increase Protein intake for optimal wound healing  2. No added salt to reduce any swelling  3. If diabetic, maintain good glucose control  4. If you smoke, smoking prohibits wound healing, we ask that you refrain from smoking.  5. When taking antibiotics take the entire prescription as ordered. Do not stop taking until medication is all gone unless otherwise instructed.   6. Exercise as tolerated.   7. Keep weight off wounds and reposition every 2 hours if applicable.  8. If wound(s) is on your lower extremity, elevate legs to the level of the heart or above for 30 minutes 4-5 times a day and/or when sitting. Avoid standing for long periods of time.   9. Do not get wounds wet in bath or shower unless otherwise instructed by your physician. If your wound is on your foot or leg, you may purchase a cast bag. Please ask at the pharmacy.      If Vascular testing is ordered, please call [phone] to schedule.    Vascular tests ordered by Wound Care Physicians may take up to 2 hours to complete. Please keep that in mind when scheduling.     If Vascular testing is scheduled, please bring supplies to replace your dressing after testing is done. The vascular department does not stock supplies.     Wound: Left leg     With each dressing change, rinse wounds with 0.9% Saline. (May use wound wash or soft contact solution. Both can be purchased at a local drug store). If unable to obtain saline, may use a gentle soap and water.    Dressing care:  a vitamin D supplement. Look at [facility] for a new recliner. Take diuretic as ordered. Do not get leg wet in shower. Can get a

## 2025-02-21 ENCOUNTER — HOSPITAL ENCOUNTER (OUTPATIENT)
Dept: WOUND CARE | Age: 50
Discharge: HOME | End: 2025-02-21
Attending: PODIATRIST
Payer: COMMERCIAL

## 2025-02-21 VITALS
RESPIRATION RATE: 19 BRPM | TEMPERATURE: 96.3 F | SYSTOLIC BLOOD PRESSURE: 184 MMHG | DIASTOLIC BLOOD PRESSURE: 89 MMHG | HEART RATE: 106 BPM

## 2025-02-21 DIAGNOSIS — I89.0 LYMPHEDEMA: ICD-10-CM

## 2025-02-21 DIAGNOSIS — R60.0 EDEMA LEG: ICD-10-CM

## 2025-02-21 DIAGNOSIS — L97.223 NON-PRESSURE CHRONIC ULCER OF LEFT CALF WITH NECROSIS OF MUSCLE (HCC): ICD-10-CM

## 2025-02-21 DIAGNOSIS — I73.89 OTHER SPECIFIED PERIPHERAL VASCULAR DISEASES: Primary | ICD-10-CM

## 2025-02-21 PROCEDURE — 11042 DBRDMT SUBQ TIS 1ST 20SQCM/<: CPT

## 2025-02-21 PROCEDURE — 29581 APPL MULTLAYER CMPRN SYS LEG: CPT

## 2025-02-21 PROCEDURE — 97597 DBRDMT OPN WND 1ST 20 CM/<: CPT

## 2025-02-21 RX ORDER — LIDOCAINE 50 MG/G
OINTMENT TOPICAL ONCE
OUTPATIENT
Start: 2025-02-21 | End: 2025-02-21

## 2025-02-21 RX ORDER — BETAMETHASONE DIPROPIONATE 0.5 MG/G
CREAM TOPICAL ONCE
OUTPATIENT
Start: 2025-02-21 | End: 2025-02-21

## 2025-02-21 RX ORDER — LIDOCAINE 40 MG/G
CREAM TOPICAL ONCE
OUTPATIENT
Start: 2025-02-21 | End: 2025-02-21

## 2025-02-21 RX ORDER — GINSENG 100 MG
CAPSULE ORAL ONCE
OUTPATIENT
Start: 2025-02-21 | End: 2025-02-21

## 2025-02-21 RX ORDER — LIDOCAINE HYDROCHLORIDE 20 MG/ML
JELLY TOPICAL ONCE
OUTPATIENT
Start: 2025-02-21 | End: 2025-02-21

## 2025-02-21 RX ORDER — GENTAMICIN SULFATE 1 MG/G
OINTMENT TOPICAL ONCE
OUTPATIENT
Start: 2025-02-21 | End: 2025-02-21

## 2025-02-21 RX ORDER — LIDOCAINE 40 MG/G
CREAM TOPICAL ONCE
Status: DISCONTINUED | OUTPATIENT
Start: 2025-02-21 | End: 2025-02-22 | Stop reason: HOSPADM

## 2025-02-21 RX ORDER — SODIUM CHLOR/HYPOCHLOROUS ACID 0.033 %
SOLUTION, IRRIGATION IRRIGATION ONCE
OUTPATIENT
Start: 2025-02-21 | End: 2025-02-21

## 2025-02-21 RX ORDER — MUPIROCIN 20 MG/G
OINTMENT TOPICAL ONCE
OUTPATIENT
Start: 2025-02-21 | End: 2025-02-21

## 2025-02-21 RX ORDER — LIDOCAINE HYDROCHLORIDE 40 MG/ML
SOLUTION TOPICAL ONCE
OUTPATIENT
Start: 2025-02-21 | End: 2025-02-21

## 2025-02-21 RX ORDER — TRIAMCINOLONE ACETONIDE 1 MG/G
OINTMENT TOPICAL ONCE
OUTPATIENT
Start: 2025-02-21 | End: 2025-02-21

## 2025-02-21 RX ORDER — NEOMYCIN/BACITRACIN/POLYMYXINB 3.5-400-5K
OINTMENT (GRAM) TOPICAL ONCE
OUTPATIENT
Start: 2025-02-21 | End: 2025-02-21

## 2025-02-21 RX ORDER — SILVER SULFADIAZINE 10 MG/G
CREAM TOPICAL ONCE
OUTPATIENT
Start: 2025-02-21 | End: 2025-02-21

## 2025-02-21 RX ORDER — BACITRACIN ZINC AND POLYMYXIN B SULFATE 500; 1000 [USP'U]/G; [USP'U]/G
OINTMENT TOPICAL ONCE
OUTPATIENT
Start: 2025-02-21 | End: 2025-02-21

## 2025-02-21 RX ORDER — CLOBETASOL PROPIONATE 0.5 MG/G
OINTMENT TOPICAL ONCE
OUTPATIENT
Start: 2025-02-21 | End: 2025-02-21

## 2025-02-21 NOTE — PROGRESS NOTES
Multilayer Compression Wrap   Below the Knee    NAME:  Fani Mar  YOB: 1975  MEDICAL RECORD NUMBER:  [id number]  DATE:  2/21/2025    Multilayer compression wrap: Removed old Multilayer wrap if indicated and wash leg with mild soap/water.  Applied moisturizing agent to dry skin as needed.   Applied primary and secondary dressing as ordered.  Applied multilayered dressing below the knee to right lower leg.  Applied multilayered dressing below the knee to left lower leg.  Instructed patient/caregiver not to remove dressing and to keep it clean and dry.   Instructed patient/caregiver on complications to report to provider, such as pain, numbness in toes, heavy drainage, and slippage of dressing.  Instructed patient on purpose of compression dressing and on activity and exercise recommendations.     Applied  2 layer wrap and ace wrap from toes to knee overlapping each time    Electronically signed by Georgie Amador RN on 2/21/2025 at 10:51 AM

## 2025-02-21 NOTE — PROGRESS NOTES
[facility]  Progress Note and Procedure Note      Fani Mar  AGE: 49 y.o.   GENDER: female  : 1975  TODAY'S DATE:  2025    Subjective:     Chief Complaint   Patient presents with    Wound Check     BLEs         HISTORY of PRESENT ILLNESS HPI     Fani Mar is a 49 y.o. female who presents today for wound evaluation.   History of Wound: Patient elects to having wound for at least 4 weeks on the back of her leg.  She states it is a venous stasis ulceration.      She has left the dressing intact.  She feels that stress is making her itch her legs.  She also feels like the dressing may have been causing some of the itching this week.  She has no other complaints at this time.  She denies current nausea, vomiting, fever, chills, shortness of breath or chest pain.    Wound Pain:  intermittent  Severity:  3 / 10   Wound Type:  venous  Modifying Factors:  edema, venous stasis, and lymphedema  Associated Signs/Symptoms:  edema, erythema, drainage, and odor        PAST MEDICAL HISTORY        Diagnosis Date    Anxiety     Depression     Hypertension     PCOS (polycystic ovarian syndrome)        PAST SURGICAL HISTORY    Past Surgical History:   Procedure Laterality Date    APPENDECTOMY      TONSILLECTOMY AND ADENOIDECTOMY      TYMPANOSTOMY TUBE PLACEMENT         FAMILY HISTORY    Family History   Problem Relation Age of Onset    Diabetes Mother     Dementia Mother     High Blood Pressure Father     Pancreatic Cancer Father     Cancer Father         Pancreatic    Coronary Art Dis Sister         Congestive heart failure with pulmonary hypertension    Pancreatic Cancer Sister         PULM HTN    Cancer Brother 34        Brain tumor       SOCIAL HISTORY    Social History     Tobacco Use    Smoking status: Never     Passive exposure: Never    Smokeless tobacco: Never   Substance Use Topics    Alcohol use: Yes     Comment: 1 glass on holidays/spec. occassions, avg < 1/month    Drug use:

## 2025-02-27 NOTE — PATIENT INSTRUCTIONS
[facility]  [address]   [place], [place] [address]  Telephone: [phone]     FAX [phone]    Discharge Instructions    Important reminders:    **If you have any signs and symptoms of illness (Cough, fever, congestion, nausea, vomiting, diarrhea, etc.) please call the wound care center prior to your appointment.    1. Increase Protein intake for optimal wound healing  2. No added salt to reduce any swelling  3. If diabetic, maintain good glucose control  4. If you smoke, smoking prohibits wound healing, we ask that you refrain from smoking.  5. When taking antibiotics take the entire prescription as ordered. Do not stop taking until medication is all gone unless otherwise instructed.   6. Exercise as tolerated.   7. Keep weight off wounds and reposition every 2 hours if applicable.  8. If wound(s) is on your lower extremity, elevate legs to the level of the heart or above for 30 minutes 4-5 times a day and/or when sitting. Avoid standing for long periods of time.   9. Do not get wounds wet in bath or shower unless otherwise instructed by your physician. If your wound is on your foot or leg, you may purchase a cast bag. Please ask at the pharmacy.      If Vascular testing is ordered, please call [phone] to schedule.    Vascular tests ordered by Wound Care Physicians may take up to 2 hours to complete. Please keep that in mind when scheduling.     If Vascular testing is scheduled, please bring supplies to replace your dressing after testing is done. The vascular department does not stock supplies.     Wound: Left leg     With each dressing change, rinse wounds with 0.9% Saline. (May use wound wash or soft contact solution. Both can be purchased at a local drug store). If unable to obtain saline, may use a gentle soap and water.    Dressing care:  a vitamin D supplement. Look at [facility] for a new recliner. Take diuretic as ordered. Do not get leg wet in shower. Can get a

## 2025-02-28 ENCOUNTER — HOSPITAL ENCOUNTER (OUTPATIENT)
Dept: WOUND CARE | Age: 50
Discharge: HOME | End: 2025-02-28
Attending: PODIATRIST
Payer: COMMERCIAL

## 2025-02-28 VITALS
DIASTOLIC BLOOD PRESSURE: 83 MMHG | SYSTOLIC BLOOD PRESSURE: 172 MMHG | HEART RATE: 86 BPM | RESPIRATION RATE: 17 BRPM | TEMPERATURE: 96.4 F

## 2025-02-28 DIAGNOSIS — I89.0 LYMPHEDEMA: ICD-10-CM

## 2025-02-28 DIAGNOSIS — I73.89 OTHER SPECIFIED PERIPHERAL VASCULAR DISEASES: Primary | ICD-10-CM

## 2025-02-28 DIAGNOSIS — R60.0 EDEMA LEG: ICD-10-CM

## 2025-02-28 DIAGNOSIS — L97.223 NON-PRESSURE CHRONIC ULCER OF LEFT CALF WITH NECROSIS OF MUSCLE (HCC): ICD-10-CM

## 2025-02-28 PROCEDURE — 97597 DBRDMT OPN WND 1ST 20 CM/<: CPT

## 2025-02-28 PROCEDURE — 11042 DBRDMT SUBQ TIS 1ST 20SQCM/<: CPT

## 2025-02-28 PROCEDURE — 29581 APPL MULTLAYER CMPRN SYS LEG: CPT

## 2025-02-28 RX ORDER — BETAMETHASONE DIPROPIONATE 0.5 MG/G
CREAM TOPICAL ONCE
OUTPATIENT
Start: 2025-02-28 | End: 2025-02-28

## 2025-02-28 RX ORDER — GINSENG 100 MG
CAPSULE ORAL ONCE
OUTPATIENT
Start: 2025-02-28 | End: 2025-02-28

## 2025-02-28 RX ORDER — LIDOCAINE HYDROCHLORIDE 40 MG/ML
SOLUTION TOPICAL ONCE
OUTPATIENT
Start: 2025-02-28 | End: 2025-02-28

## 2025-02-28 RX ORDER — LIDOCAINE 40 MG/G
CREAM TOPICAL ONCE
OUTPATIENT
Start: 2025-02-28 | End: 2025-02-28

## 2025-02-28 RX ORDER — SILVER SULFADIAZINE 10 MG/G
CREAM TOPICAL ONCE
OUTPATIENT
Start: 2025-02-28 | End: 2025-02-28

## 2025-02-28 RX ORDER — LIDOCAINE HYDROCHLORIDE 20 MG/ML
JELLY TOPICAL ONCE
OUTPATIENT
Start: 2025-02-28 | End: 2025-02-28

## 2025-02-28 RX ORDER — SODIUM CHLOR/HYPOCHLOROUS ACID 0.033 %
SOLUTION, IRRIGATION IRRIGATION ONCE
OUTPATIENT
Start: 2025-02-28 | End: 2025-02-28

## 2025-02-28 RX ORDER — TRIAMCINOLONE ACETONIDE 1 MG/G
OINTMENT TOPICAL ONCE
OUTPATIENT
Start: 2025-02-28 | End: 2025-02-28

## 2025-02-28 RX ORDER — MUPIROCIN 20 MG/G
OINTMENT TOPICAL ONCE
OUTPATIENT
Start: 2025-02-28 | End: 2025-02-28

## 2025-02-28 RX ORDER — CLOBETASOL PROPIONATE 0.5 MG/G
OINTMENT TOPICAL ONCE
OUTPATIENT
Start: 2025-02-28 | End: 2025-02-28

## 2025-02-28 RX ORDER — BACITRACIN ZINC AND POLYMYXIN B SULFATE 500; 1000 [USP'U]/G; [USP'U]/G
OINTMENT TOPICAL ONCE
OUTPATIENT
Start: 2025-02-28 | End: 2025-02-28

## 2025-02-28 RX ORDER — LIDOCAINE 40 MG/G
CREAM TOPICAL ONCE
Status: DISCONTINUED | OUTPATIENT
Start: 2025-02-28 | End: 2025-03-01 | Stop reason: HOSPADM

## 2025-02-28 RX ORDER — LIDOCAINE 50 MG/G
OINTMENT TOPICAL ONCE
OUTPATIENT
Start: 2025-02-28 | End: 2025-02-28

## 2025-02-28 RX ORDER — NEOMYCIN/BACITRACIN/POLYMYXINB 3.5-400-5K
OINTMENT (GRAM) TOPICAL ONCE
OUTPATIENT
Start: 2025-02-28 | End: 2025-02-28

## 2025-02-28 RX ORDER — GENTAMICIN SULFATE 1 MG/G
OINTMENT TOPICAL ONCE
OUTPATIENT
Start: 2025-02-28 | End: 2025-02-28

## 2025-02-28 ASSESSMENT — PAIN DESCRIPTION - ONSET: ONSET: ON-GOING

## 2025-02-28 ASSESSMENT — PAIN SCALES - GENERAL: PAINLEVEL_OUTOF10: 8

## 2025-02-28 ASSESSMENT — PAIN DESCRIPTION - ORIENTATION: ORIENTATION: LEFT;RIGHT

## 2025-02-28 ASSESSMENT — PAIN DESCRIPTION - LOCATION: LOCATION: LEG

## 2025-02-28 ASSESSMENT — PAIN DESCRIPTION - PAIN TYPE: TYPE: CHRONIC PAIN

## 2025-02-28 ASSESSMENT — PAIN DESCRIPTION - DESCRIPTORS: DESCRIPTORS: ITCHING

## 2025-02-28 ASSESSMENT — PAIN - FUNCTIONAL ASSESSMENT: PAIN_FUNCTIONAL_ASSESSMENT: PREVENTS OR INTERFERES SOME ACTIVE ACTIVITIES AND ADLS

## 2025-02-28 ASSESSMENT — PAIN DESCRIPTION - FREQUENCY: FREQUENCY: INTERMITTENT

## 2025-02-28 NOTE — PROGRESS NOTES
Multilayer Compression Wrap   Below the Knee    NAME:  Fani Mar  YOB: 1975  MEDICAL RECORD NUMBER:  [id number]  DATE:  2/28/2025    Multilayer compression wrap: Applied primary and secondary dressing as ordered.  Applied multilayered dressing below the knee to right lower leg.  Applied multilayered dressing below the knee to left lower leg.  Instructed patient/caregiver not to remove dressing and to keep it clean and dry.   Instructed patient/caregiver on complications to report to provider, such as pain, numbness in toes, heavy drainage, and slippage of dressing.  Instructed patient on purpose of compression dressing and on activity and exercise recommendations.     Applied  2 layer wrap from toes to knee overlapping each time    Electronically signed by GILL JIMENEZ RN on 2/28/2025 at 12:37 PM       
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 Outpatient Wound Care Protocol    Edema leg    Relevant Medications    lidocaine (LMX) 4 % cream    Other Relevant Orders    Initiate Outpatient Wound Care Protocol    Other specified peripheral vascular diseases - Primary    Relevant Medications    lidocaine (LMX) 4 % cream    Other Relevant Orders    Initiate Outpatient Wound Care Protocol       Procedure Note    Performed by: Siddhartha Carreno DPM    Consent obtained: Yes    Time out taken:  Yes    Pain Control: Anesthetic  Anesthetic: 4% Lidocaine Cream     Debridement:Excisional Debridement    Using curette the wound was sharply debrided    down through and including the removal of epidermis, dermis, and subcutaneous tissue.        Devitalized Tissue Debrided:  fibrin, biofilm, slough, necrotic/eschar, and exudate    Pre Debridement Measurements:  Are located in the Wound Documentation Flow Sheet  Wound Care Documentation:  Wound 10/25/24 Leg Left;Posterior;Lower;Lateral #1 (Active)   Wound Image   02/28/25 0917   Wound Etiology Venous 02/28/25 0917   Wound Cleansed Wound cleanser 02/28/25 0917   Wound Length (cm) 0.6 cm 02/28/25 0917   Wound Width (cm) 0.8 cm 02/28/25 0917   Wound Depth (cm) 0.1 cm 02/28/25 0917   Wound Surface Area (cm^2) 0.48 cm^2 02/28/25 0917   Change in Wound Size % (l*w) 91.43 02/28/25 0917   Wound Volume (cm^3) 0.048 cm^3 02/28/25 0917   Wound Healing % 91 02/28/25 0917   Post-Procedure Length (cm) 0.6 cm 02/28/25 0946   Post-Procedure Width (cm) 0.8 cm 02/28/25 0946   Post-Procedure Depth (cm) 0.1 cm 02/28/25 0946   Post-Procedure Surface Area (cm^2) 0.48 cm^2 02/28/25 0946   Post-Procedure Volume (cm^3) 0.048 cm^3 02/28/25 0946

## 2025-03-03 ENCOUNTER — OFFICE VISIT (OUTPATIENT)
Dept: FAMILY MEDICINE CLINIC | Age: 50
End: 2025-03-03
Payer: COMMERCIAL

## 2025-03-03 VITALS
HEART RATE: 96 BPM | DIASTOLIC BLOOD PRESSURE: 86 MMHG | BODY MASS INDEX: 48.82 KG/M2 | OXYGEN SATURATION: 96 % | WEIGHT: 293 LBS | HEIGHT: 65 IN | SYSTOLIC BLOOD PRESSURE: 138 MMHG

## 2025-03-03 DIAGNOSIS — E66.01 CLASS 3 SEVERE OBESITY WITH SERIOUS COMORBIDITY AND BODY MASS INDEX (BMI) OF 60.0 TO 69.9 IN ADULT, UNSPECIFIED OBESITY TYPE: Primary | ICD-10-CM

## 2025-03-03 DIAGNOSIS — F33.0 MILD EPISODE OF RECURRENT MAJOR DEPRESSIVE DISORDER: ICD-10-CM

## 2025-03-03 DIAGNOSIS — E66.813 CLASS 3 SEVERE OBESITY WITH SERIOUS COMORBIDITY AND BODY MASS INDEX (BMI) OF 60.0 TO 69.9 IN ADULT, UNSPECIFIED OBESITY TYPE: Primary | ICD-10-CM

## 2025-03-03 DIAGNOSIS — F41.9 ANXIETY: ICD-10-CM

## 2025-03-03 PROCEDURE — 99214 OFFICE O/P EST MOD 30 MIN: CPT | Performed by: NURSE PRACTITIONER

## 2025-03-03 RX ORDER — PHENTERMINE HYDROCHLORIDE 37.5 MG/1
37.5 CAPSULE ORAL EVERY MORNING
Qty: 90 CAPSULE | Refills: 0 | Status: SHIPPED | OUTPATIENT
Start: 2025-03-03 | End: 2025-06-01

## 2025-03-03 RX ORDER — HYDROXYZINE PAMOATE 25 MG/1
CAPSULE ORAL
Qty: 180 CAPSULE | Refills: 0 | Status: SHIPPED | OUTPATIENT
Start: 2025-03-03

## 2025-03-03 RX ORDER — VILAZODONE HYDROCHLORIDE 40 MG/1
40 TABLET ORAL DAILY
Qty: 60 TABLET | Refills: 0 | Status: SHIPPED | OUTPATIENT
Start: 2025-03-03 | End: 2025-05-02

## 2025-03-03 SDOH — ECONOMIC STABILITY: FOOD INSECURITY: WITHIN THE PAST 12 MONTHS, THE FOOD YOU BOUGHT JUST DIDN'T LAST AND YOU DIDN'T HAVE MONEY TO GET MORE.: NEVER TRUE

## 2025-03-03 SDOH — ECONOMIC STABILITY: FOOD INSECURITY: WITHIN THE PAST 12 MONTHS, YOU WORRIED THAT YOUR FOOD WOULD RUN OUT BEFORE YOU GOT MONEY TO BUY MORE.: NEVER TRUE

## 2025-03-03 ASSESSMENT — PATIENT HEALTH QUESTIONNAIRE - PHQ9
8. MOVING OR SPEAKING SO SLOWLY THAT OTHER PEOPLE COULD HAVE NOTICED. OR THE OPPOSITE, BEING SO FIGETY OR RESTLESS THAT YOU HAVE BEEN MOVING AROUND A LOT MORE THAN USUAL: NOT AT ALL
7. TROUBLE CONCENTRATING ON THINGS, SUCH AS READING THE NEWSPAPER OR WATCHING TELEVISION: SEVERAL DAYS
10. IF YOU CHECKED OFF ANY PROBLEMS, HOW DIFFICULT HAVE THESE PROBLEMS MADE IT FOR YOU TO DO YOUR WORK, TAKE CARE OF THINGS AT HOME, OR GET ALONG WITH OTHER PEOPLE: SOMEWHAT DIFFICULT
6. FEELING BAD ABOUT YOURSELF - OR THAT YOU ARE A FAILURE OR HAVE LET YOURSELF OR YOUR FAMILY DOWN: SEVERAL DAYS
SUM OF ALL RESPONSES TO PHQ QUESTIONS 1-9: 9
3. TROUBLE FALLING OR STAYING ASLEEP: NOT AT ALL
5. POOR APPETITE OR OVEREATING: MORE THAN HALF THE DAYS
SUM OF ALL RESPONSES TO PHQ QUESTIONS 1-9: 9
SUM OF ALL RESPONSES TO PHQ QUESTIONS 1-9: 9
1. LITTLE INTEREST OR PLEASURE IN DOING THINGS: MORE THAN HALF THE DAYS
4. FEELING TIRED OR HAVING LITTLE ENERGY: SEVERAL DAYS
9. THOUGHTS THAT YOU WOULD BE BETTER OFF DEAD, OR OF HURTING YOURSELF: NOT AT ALL
SUM OF ALL RESPONSES TO PHQ QUESTIONS 1-9: 9
2. FEELING DOWN, DEPRESSED OR HOPELESS: MORE THAN HALF THE DAYS

## 2025-03-03 ASSESSMENT — ANXIETY QUESTIONNAIRES
2. NOT BEING ABLE TO STOP OR CONTROL WORRYING: NOT AT ALL
7. FEELING AFRAID AS IF SOMETHING AWFUL MIGHT HAPPEN: NOT AT ALL
4. TROUBLE RELAXING: SEVERAL DAYS
6. BECOMING EASILY ANNOYED OR IRRITABLE: SEVERAL DAYS
3. WORRYING TOO MUCH ABOUT DIFFERENT THINGS: NOT AT ALL
IF YOU CHECKED OFF ANY PROBLEMS ON THIS QUESTIONNAIRE, HOW DIFFICULT HAVE THESE PROBLEMS MADE IT FOR YOU TO DO YOUR WORK, TAKE CARE OF THINGS AT HOME, OR GET ALONG WITH OTHER PEOPLE: NOT DIFFICULT AT ALL
1. FEELING NERVOUS, ANXIOUS, OR ON EDGE: SEVERAL DAYS
GAD7 TOTAL SCORE: 3
5. BEING SO RESTLESS THAT IT IS HARD TO SIT STILL: NOT AT ALL

## 2025-03-03 NOTE — PROGRESS NOTES
use of Benadryl to aid sleep.    Her weight loss goal is to alleviate pressure on her knees. She has been experiencing hot flashes, as reported by her .    She has been dealing with pruritus on her legs, knees, arms, back, and torso since November. The itching is most severe in the morning, particularly around her lower back, and is accompanied by a constant urge to scratch. She has been using moisturizers to manage the dryness.    MEDICATIONS  Current: Viibryd  Past: Ambien, Lunesta       Results            Review of Systems   PER HPI  Objective   Physical Exam     Physical Exam  Patient is alert and oriented x3. No acute distress noted.  Thyroid is normal with no masses or tenderness.  Lungs are clear to auscultation bilaterally.  Heart has a normal sinus rhythm. No murmurs noted.  Skin abraded. Skin on torso and back shows no open areas, just slightly reddened    Vital Signs  Blood pressure is normal.                The patient (or guardian, if applicable) and other individuals in attendance with the patient were advised that Artificial Intelligence will be utilized during this visit to record, process the conversation to generate a clinical note, and support improvement of the AI technology. The patient (or guardian, if applicable) and other individuals in attendance at the appointment consented to the use of AI, including the recording.      This note was generated completely or in part utilizing Dragon dictation speech recognition software.  Occasionally, words are mistranscribed and despite editing, the text may contain inaccuracies due to incorrect word recognition.  If further clarification is needed please contact the office at [phone].    An electronic signature was used to authenticate this note.    --Lucila Magallon, SOCRATES - CNP

## 2025-03-06 NOTE — PATIENT INSTRUCTIONS
[facility]  [address]   [place], [place] [address]  Telephone: [phone]     FAX [phone]    Discharge Instructions    Important reminders:    **If you have any signs and symptoms of illness (Cough, fever, congestion, nausea, vomiting, diarrhea, etc.) please call the wound care center prior to your appointment.    1. Increase Protein intake for optimal wound healing  2. No added salt to reduce any swelling  3. If diabetic, maintain good glucose control  4. If you smoke, smoking prohibits wound healing, we ask that you refrain from smoking.  5. When taking antibiotics take the entire prescription as ordered. Do not stop taking until medication is all gone unless otherwise instructed.   6. Exercise as tolerated.   7. Keep weight off wounds and reposition every 2 hours if applicable.  8. If wound(s) is on your lower extremity, elevate legs to the level of the heart or above for 30 minutes 4-5 times a day and/or when sitting. Avoid standing for long periods of time.   9. Do not get wounds wet in bath or shower unless otherwise instructed by your physician. If your wound is on your foot or leg, you may purchase a cast bag. Please ask at the pharmacy.      If Vascular testing is ordered, please call [phone] to schedule.    Vascular tests ordered by Wound Care Physicians may take up to 2 hours to complete. Please keep that in mind when scheduling.     If Vascular testing is scheduled, please bring supplies to replace your dressing after testing is done. The vascular department does not stock supplies.     Wound: Left leg     With each dressing change, rinse wounds with 0.9% Saline. (May use wound wash or soft contact solution. Both can be purchased at a local drug store). If unable to obtain saline, may use a gentle soap and water.    Dressing care: Photo of Right leg today.  a vitamin D supplement. Look at [employer] for a new recliner. Take diuretic as ordered. Do not get leg

## 2025-03-07 ENCOUNTER — HOSPITAL ENCOUNTER (OUTPATIENT)
Dept: WOUND CARE | Age: 50
Discharge: HOME | End: 2025-03-07
Attending: PODIATRIST
Payer: COMMERCIAL

## 2025-03-07 VITALS
HEART RATE: 87 BPM | DIASTOLIC BLOOD PRESSURE: 92 MMHG | SYSTOLIC BLOOD PRESSURE: 159 MMHG | TEMPERATURE: 96.1 F | RESPIRATION RATE: 17 BRPM

## 2025-03-07 DIAGNOSIS — R60.0 EDEMA LEG: ICD-10-CM

## 2025-03-07 DIAGNOSIS — I73.89 OTHER SPECIFIED PERIPHERAL VASCULAR DISEASES: Primary | ICD-10-CM

## 2025-03-07 DIAGNOSIS — I89.0 LYMPHEDEMA: ICD-10-CM

## 2025-03-07 DIAGNOSIS — L97.223 NON-PRESSURE CHRONIC ULCER OF LEFT CALF WITH NECROSIS OF MUSCLE (HCC): ICD-10-CM

## 2025-03-07 PROCEDURE — 11042 DBRDMT SUBQ TIS 1ST 20SQCM/<: CPT

## 2025-03-07 PROCEDURE — 29581 APPL MULTLAYER CMPRN SYS LEG: CPT

## 2025-03-07 RX ORDER — LIDOCAINE 50 MG/G
OINTMENT TOPICAL ONCE
OUTPATIENT
Start: 2025-03-07 | End: 2025-03-07

## 2025-03-07 RX ORDER — LIDOCAINE HYDROCHLORIDE 40 MG/ML
SOLUTION TOPICAL ONCE
OUTPATIENT
Start: 2025-03-07 | End: 2025-03-07

## 2025-03-07 RX ORDER — TRIAMCINOLONE ACETONIDE 1 MG/G
OINTMENT TOPICAL ONCE
OUTPATIENT
Start: 2025-03-07 | End: 2025-03-07

## 2025-03-07 RX ORDER — LIDOCAINE HYDROCHLORIDE 20 MG/ML
JELLY TOPICAL ONCE
OUTPATIENT
Start: 2025-03-07 | End: 2025-03-07

## 2025-03-07 RX ORDER — BACITRACIN ZINC AND POLYMYXIN B SULFATE 500; 1000 [USP'U]/G; [USP'U]/G
OINTMENT TOPICAL ONCE
OUTPATIENT
Start: 2025-03-07 | End: 2025-03-07

## 2025-03-07 RX ORDER — SODIUM CHLOR/HYPOCHLOROUS ACID 0.033 %
SOLUTION, IRRIGATION IRRIGATION ONCE
OUTPATIENT
Start: 2025-03-07 | End: 2025-03-07

## 2025-03-07 RX ORDER — GENTAMICIN SULFATE 1 MG/G
OINTMENT TOPICAL ONCE
OUTPATIENT
Start: 2025-03-07 | End: 2025-03-07

## 2025-03-07 RX ORDER — BETAMETHASONE DIPROPIONATE 0.5 MG/G
CREAM TOPICAL ONCE
OUTPATIENT
Start: 2025-03-07 | End: 2025-03-07

## 2025-03-07 RX ORDER — GINSENG 100 MG
CAPSULE ORAL ONCE
OUTPATIENT
Start: 2025-03-07 | End: 2025-03-07

## 2025-03-07 RX ORDER — LIDOCAINE 40 MG/G
CREAM TOPICAL ONCE
OUTPATIENT
Start: 2025-03-07 | End: 2025-03-07

## 2025-03-07 RX ORDER — LIDOCAINE 40 MG/G
CREAM TOPICAL ONCE
Status: DISCONTINUED | OUTPATIENT
Start: 2025-03-07 | End: 2025-03-08 | Stop reason: HOSPADM

## 2025-03-07 RX ORDER — NEOMYCIN/BACITRACIN/POLYMYXINB 3.5-400-5K
OINTMENT (GRAM) TOPICAL ONCE
OUTPATIENT
Start: 2025-03-07 | End: 2025-03-07

## 2025-03-07 RX ORDER — CLOBETASOL PROPIONATE 0.5 MG/G
OINTMENT TOPICAL ONCE
OUTPATIENT
Start: 2025-03-07 | End: 2025-03-07

## 2025-03-07 RX ORDER — MUPIROCIN 20 MG/G
OINTMENT TOPICAL ONCE
OUTPATIENT
Start: 2025-03-07 | End: 2025-03-07

## 2025-03-07 RX ORDER — SILVER SULFADIAZINE 10 MG/G
CREAM TOPICAL ONCE
OUTPATIENT
Start: 2025-03-07 | End: 2025-03-07

## 2025-03-07 ASSESSMENT — PAIN DESCRIPTION - PAIN TYPE: TYPE: CHRONIC PAIN

## 2025-03-07 ASSESSMENT — PAIN SCALES - GENERAL: PAINLEVEL_OUTOF10: 2

## 2025-03-07 ASSESSMENT — PAIN DESCRIPTION - ORIENTATION: ORIENTATION: LEFT;RIGHT

## 2025-03-07 ASSESSMENT — PAIN - FUNCTIONAL ASSESSMENT: PAIN_FUNCTIONAL_ASSESSMENT: ACTIVITIES ARE NOT PREVENTED

## 2025-03-07 ASSESSMENT — PAIN DESCRIPTION - FREQUENCY: FREQUENCY: INTERMITTENT

## 2025-03-07 ASSESSMENT — PAIN DESCRIPTION - ONSET: ONSET: ON-GOING

## 2025-03-07 ASSESSMENT — PAIN DESCRIPTION - LOCATION: LOCATION: LEG

## 2025-03-07 ASSESSMENT — PAIN DESCRIPTION - DESCRIPTORS: DESCRIPTORS: ITCHING

## 2025-03-07 NOTE — PLAN OF CARE
[employer]    Supply Company for Ordering Compression:    [employer] [address] [place] [address] f: [phone] f: [phone] p: [phone] [email or web address]      Center Information:    [facility]  [address]  [facility] [address]  [phone]  WOUND CARE Dept: [phone]   FAX NUMBER [phone]    Patient Demographics:      Fani Mar  [address] [place] [id number] [phone]   : 1975  AGE: 49 y.o.     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 Outpatient Wound Care Protocol    Edema leg    Relevant Medications    lidocaine (LMX) 4 % cream    Other Relevant Orders    Initiate Outpatient Wound Care Protocol    Other specified peripheral vascular diseases - Primary    Relevant Medications    lidocaine (LMX) 4 % cream    Other Relevant Orders    Initiate Outpatient Wound Care Protocol       Wound 10/25/24 Leg Left;Posterior;Lower;Lateral #1 (Active)   Wound Image   25 0917   Wound Etiology Venous 25 0908   Wound Cleansed Wound cleanser 25 0908   Wound Length (cm) 0.6 cm 25 0908   Wound Width (cm) 0.9 cm 25 0908   Wound Depth (cm) 0.1 cm 25 0908   Wound Surface Area (cm^2) 0.54 cm^2 25

## 2025-03-07 NOTE — PROGRESS NOTES
Multilayer Compression Wrap   Below the Knee    NAME:  Fani Mar  YOB: 1975  MEDICAL RECORD NUMBER:  [id number]  DATE:  3/7/2025    Multilayer compression wrap: Removed old Multilayer wrap if indicated and wash leg with mild soap/water.  Applied moisturizing agent to dry skin as needed.   Applied primary and secondary dressing as ordered.  Applied multilayered dressing below the knee to right lower leg.  Applied multilayered dressing below the knee to left lower leg.  Instructed patient/caregiver not to remove dressing and to keep it clean and dry.   Instructed patient/caregiver on complications to report to provider, such as pain, numbness in toes, heavy drainage, and slippage of dressing.  Instructed patient on purpose of compression dressing and on activity and exercise recommendations.     Applied  2 layer wrap and Ace wrap from toes to knee overlapping each time    Electronically signed by Georgie Amador RN on 3/7/2025 at 11:03 AM

## 2025-03-07 NOTE — PLAN OF CARE
Discharge instructions given.  Patient verbalized understanding.  Return to [facility] in 1 week(s).

## 2025-03-14 ENCOUNTER — HOSPITAL ENCOUNTER (OUTPATIENT)
Dept: WOUND CARE | Age: 50
Discharge: HOME | End: 2025-03-14
Attending: PODIATRIST
Payer: COMMERCIAL

## 2025-03-14 VITALS
DIASTOLIC BLOOD PRESSURE: 91 MMHG | HEART RATE: 101 BPM | TEMPERATURE: 96.3 F | RESPIRATION RATE: 17 BRPM | SYSTOLIC BLOOD PRESSURE: 161 MMHG

## 2025-03-14 DIAGNOSIS — L97.223 NON-PRESSURE CHRONIC ULCER OF LEFT CALF WITH NECROSIS OF MUSCLE (HCC): ICD-10-CM

## 2025-03-14 DIAGNOSIS — I73.89 OTHER SPECIFIED PERIPHERAL VASCULAR DISEASES: Primary | ICD-10-CM

## 2025-03-14 DIAGNOSIS — R60.0 EDEMA LEG: ICD-10-CM

## 2025-03-14 DIAGNOSIS — I89.0 LYMPHEDEMA: ICD-10-CM

## 2025-03-14 PROCEDURE — 11042 DBRDMT SUBQ TIS 1ST 20SQCM/<: CPT

## 2025-03-14 PROCEDURE — 29581 APPL MULTLAYER CMPRN SYS LEG: CPT

## 2025-03-14 RX ORDER — NEOMYCIN/BACITRACIN/POLYMYXINB 3.5-400-5K
OINTMENT (GRAM) TOPICAL ONCE
OUTPATIENT
Start: 2025-03-14 | End: 2025-03-14

## 2025-03-14 RX ORDER — SODIUM CHLOR/HYPOCHLOROUS ACID 0.033 %
SOLUTION, IRRIGATION IRRIGATION ONCE
OUTPATIENT
Start: 2025-03-14 | End: 2025-03-14

## 2025-03-14 RX ORDER — MUPIROCIN 20 MG/G
OINTMENT TOPICAL ONCE
OUTPATIENT
Start: 2025-03-14 | End: 2025-03-14

## 2025-03-14 RX ORDER — LIDOCAINE HYDROCHLORIDE 40 MG/ML
SOLUTION TOPICAL ONCE
OUTPATIENT
Start: 2025-03-14 | End: 2025-03-14

## 2025-03-14 RX ORDER — BETAMETHASONE DIPROPIONATE 0.5 MG/G
CREAM TOPICAL ONCE
OUTPATIENT
Start: 2025-03-14 | End: 2025-03-14

## 2025-03-14 RX ORDER — LIDOCAINE HYDROCHLORIDE 20 MG/ML
JELLY TOPICAL ONCE
OUTPATIENT
Start: 2025-03-14 | End: 2025-03-14

## 2025-03-14 RX ORDER — LIDOCAINE 50 MG/G
OINTMENT TOPICAL ONCE
OUTPATIENT
Start: 2025-03-14 | End: 2025-03-14

## 2025-03-14 RX ORDER — LIDOCAINE 40 MG/G
CREAM TOPICAL ONCE
Status: DISCONTINUED | OUTPATIENT
Start: 2025-03-14 | End: 2025-03-15 | Stop reason: HOSPADM

## 2025-03-14 RX ORDER — TRIAMCINOLONE ACETONIDE 1 MG/G
OINTMENT TOPICAL ONCE
OUTPATIENT
Start: 2025-03-14 | End: 2025-03-14

## 2025-03-14 RX ORDER — GINSENG 100 MG
CAPSULE ORAL ONCE
OUTPATIENT
Start: 2025-03-14 | End: 2025-03-14

## 2025-03-14 RX ORDER — CLOBETASOL PROPIONATE 0.5 MG/G
OINTMENT TOPICAL ONCE
OUTPATIENT
Start: 2025-03-14 | End: 2025-03-14

## 2025-03-14 RX ORDER — SILVER SULFADIAZINE 10 MG/G
CREAM TOPICAL ONCE
OUTPATIENT
Start: 2025-03-14 | End: 2025-03-14

## 2025-03-14 RX ORDER — LIDOCAINE 40 MG/G
CREAM TOPICAL ONCE
OUTPATIENT
Start: 2025-03-14 | End: 2025-03-14

## 2025-03-14 RX ORDER — BACITRACIN ZINC AND POLYMYXIN B SULFATE 500; 1000 [USP'U]/G; [USP'U]/G
OINTMENT TOPICAL ONCE
OUTPATIENT
Start: 2025-03-14 | End: 2025-03-14

## 2025-03-14 RX ORDER — GENTAMICIN SULFATE 1 MG/G
OINTMENT TOPICAL ONCE
OUTPATIENT
Start: 2025-03-14 | End: 2025-03-14

## 2025-03-14 NOTE — PROGRESS NOTES
[facility]  Progress Note and Procedure Note      Fnai Mar  AGE: 49 y.o.   GENDER: female  : 1975  TODAY'S DATE:  3/14/2025    Subjective:     Chief Complaint   Patient presents with    Wound Check     BLEs         HISTORY of PRESENT ILLNESS HPI     Fani Mar is a 49 y.o. female who presents today for wound evaluation.   History of Wound: Patient elects to having wound for at least 4 weeks on the back of her leg.  She states it is a venous stasis ulceration.      She has left the dressing intact.  She feels that stress is making her itch her legs.  She also feels like the dressing may have been causing some of the itching this week.  She has no other complaints at this time.  She denies current nausea, vomiting, fever, chills, shortness of breath or chest pain.    Wound Pain:  intermittent  Severity:  3 / 10   Wound Type:  venous  Modifying Factors:  edema, venous stasis, and lymphedema  Associated Signs/Symptoms:  edema, erythema, drainage, and odor        PAST MEDICAL HISTORY        Diagnosis Date    Anxiety     Depression     Hypertension     PCOS (polycystic ovarian syndrome)        PAST SURGICAL HISTORY    Past Surgical History:   Procedure Laterality Date    APPENDECTOMY      TONSILLECTOMY AND ADENOIDECTOMY      TYMPANOSTOMY TUBE PLACEMENT         FAMILY HISTORY    Family History   Problem Relation Age of Onset    Diabetes Mother     Dementia Mother     High Blood Pressure Father     Pancreatic Cancer Father     Cancer Father         Pancreatic    Coronary Art Dis Sister         Congestive heart failure with pulmonary hypertension    Pancreatic Cancer Sister         PULM HTN    Cancer Brother 34        Brain tumor       SOCIAL HISTORY    Social History     Tobacco Use    Smoking status: Never     Passive exposure: Never    Smokeless tobacco: Never   Substance Use Topics    Alcohol use: Yes     Comment: 1 glass on holidays/spec. occassions, avg < 1/month    Drug use:

## 2025-03-14 NOTE — PATIENT INSTRUCTIONS
[facility]  [address]   [place], [place] [address]  Telephone: [phone]     FAX [phone]    Discharge Instructions    Important reminders:    **If you have any signs and symptoms of illness (Cough, fever, congestion, nausea, vomiting, diarrhea, etc.) please call the wound care center prior to your appointment.    1. Increase Protein intake for optimal wound healing  2. No added salt to reduce any swelling  3. If diabetic, maintain good glucose control  4. If you smoke, smoking prohibits wound healing, we ask that you refrain from smoking.  5. When taking antibiotics take the entire prescription as ordered. Do not stop taking until medication is all gone unless otherwise instructed.   6. Exercise as tolerated.   7. Keep weight off wounds and reposition every 2 hours if applicable.  8. If wound(s) is on your lower extremity, elevate legs to the level of the heart or above for 30 minutes 4-5 times a day and/or when sitting. Avoid standing for long periods of time.   9. Do not get wounds wet in bath or shower unless otherwise instructed by your physician. If your wound is on your foot or leg, you may purchase a cast bag. Please ask at the pharmacy.      If Vascular testing is ordered, please call [phone] to schedule.    Vascular tests ordered by Wound Care Physicians may take up to 2 hours to complete. Please keep that in mind when scheduling.     If Vascular testing is scheduled, please bring supplies to replace your dressing after testing is done. The vascular department does not stock supplies.     Wound: Left leg     With each dressing change, rinse wounds with 0.9% Saline. (May use wound wash or soft contact solution. Both can be purchased at a local drug store). If unable to obtain saline, may use a gentle soap and water.    Dressing care:  a vitamin D supplement. Look at [facility] for a new recliner. Take diuretic as ordered. Do not get leg wet in shower. Can get a

## 2025-03-14 NOTE — PROGRESS NOTES
Multilayer Compression Wrap   Below the Knee    NAME:  Fani Mar  YOB: 1975  MEDICAL RECORD NUMBER:  [id number]  DATE:  3/14/2025    Multilayer compression wrap: Removed old Multilayer wrap if indicated and wash leg with mild soap/water.  Applied moisturizing agent to dry skin as needed.   Applied primary and secondary dressing as ordered.  Applied multilayered dressing below the knee to right lower leg.  Applied multilayered dressing below the knee to left lower leg.  Instructed patient/caregiver not to remove dressing and to keep it clean and dry.   Instructed patient/caregiver on complications to report to provider, such as pain, numbness in toes, heavy drainage, and slippage of dressing.  Instructed patient on purpose of compression dressing and on activity and exercise recommendations.     Applied  2 layer wrap from toes to knee overlapping each time    Electronically signed by ISABELLE LAZO RN on 3/14/2025 at 11:23 AM

## 2025-03-14 NOTE — PLAN OF CARE
Discharge instructions given.  Patient verbalized understanding.  Return to [facility] in 1 week(s).  Compression ordered, deductible not been met

## 2025-03-21 ENCOUNTER — HOSPITAL ENCOUNTER (OUTPATIENT)
Dept: WOUND CARE | Age: 50
Discharge: HOME | End: 2025-03-21
Attending: PODIATRIST
Payer: COMMERCIAL

## 2025-03-21 DIAGNOSIS — L97.223 NON-PRESSURE CHRONIC ULCER OF LEFT CALF WITH NECROSIS OF MUSCLE (HCC): ICD-10-CM

## 2025-03-21 DIAGNOSIS — R60.0 EDEMA LEG: ICD-10-CM

## 2025-03-21 DIAGNOSIS — I89.0 LYMPHEDEMA: ICD-10-CM

## 2025-03-21 DIAGNOSIS — I73.89 OTHER SPECIFIED PERIPHERAL VASCULAR DISEASES: Primary | ICD-10-CM

## 2025-03-21 PROCEDURE — 29581 APPL MULTLAYER CMPRN SYS LEG: CPT

## 2025-03-21 RX ORDER — BETAMETHASONE DIPROPIONATE 0.5 MG/G
CREAM TOPICAL ONCE
OUTPATIENT
Start: 2025-03-21 | End: 2025-03-21

## 2025-03-21 RX ORDER — GINSENG 100 MG
CAPSULE ORAL ONCE
OUTPATIENT
Start: 2025-03-21 | End: 2025-03-21

## 2025-03-21 RX ORDER — LIDOCAINE HYDROCHLORIDE 20 MG/ML
JELLY TOPICAL ONCE
OUTPATIENT
Start: 2025-03-21 | End: 2025-03-21

## 2025-03-21 RX ORDER — CLOBETASOL PROPIONATE 0.5 MG/G
OINTMENT TOPICAL ONCE
OUTPATIENT
Start: 2025-03-21 | End: 2025-03-21

## 2025-03-21 RX ORDER — LIDOCAINE HYDROCHLORIDE 40 MG/ML
SOLUTION TOPICAL ONCE
OUTPATIENT
Start: 2025-03-21 | End: 2025-03-21

## 2025-03-21 RX ORDER — SILVER SULFADIAZINE 10 MG/G
CREAM TOPICAL ONCE
OUTPATIENT
Start: 2025-03-21 | End: 2025-03-21

## 2025-03-21 RX ORDER — TRIAMCINOLONE ACETONIDE 1 MG/G
OINTMENT TOPICAL ONCE
OUTPATIENT
Start: 2025-03-21 | End: 2025-03-21

## 2025-03-21 RX ORDER — LIDOCAINE 50 MG/G
OINTMENT TOPICAL ONCE
OUTPATIENT
Start: 2025-03-21 | End: 2025-03-21

## 2025-03-21 RX ORDER — GENTAMICIN SULFATE 1 MG/G
OINTMENT TOPICAL ONCE
OUTPATIENT
Start: 2025-03-21 | End: 2025-03-21

## 2025-03-21 RX ORDER — BACITRACIN ZINC AND POLYMYXIN B SULFATE 500; 1000 [USP'U]/G; [USP'U]/G
OINTMENT TOPICAL ONCE
OUTPATIENT
Start: 2025-03-21 | End: 2025-03-21

## 2025-03-21 RX ORDER — NEOMYCIN/BACITRACIN/POLYMYXINB 3.5-400-5K
OINTMENT (GRAM) TOPICAL ONCE
OUTPATIENT
Start: 2025-03-21 | End: 2025-03-21

## 2025-03-21 RX ORDER — SODIUM CHLOR/HYPOCHLOROUS ACID 0.033 %
SOLUTION, IRRIGATION IRRIGATION ONCE
OUTPATIENT
Start: 2025-03-21 | End: 2025-03-21

## 2025-03-21 RX ORDER — MUPIROCIN 20 MG/G
OINTMENT TOPICAL ONCE
OUTPATIENT
Start: 2025-03-21 | End: 2025-03-21

## 2025-03-21 RX ORDER — LIDOCAINE 40 MG/G
CREAM TOPICAL ONCE
OUTPATIENT
Start: 2025-03-21 | End: 2025-03-21

## 2025-03-21 NOTE — PATIENT INSTRUCTIONS
emergency room.      Home Care Agency/Facility:     Your wound-care supplies will be provided by:   Please note, depending on your insurance coverage, you may have out-of-pocket expenses for these supplies. Someone from the company should call you to confirm your order and discuss those potential costs before they ship your products -- please anticipate that call. If your out-of-pocket cost could be substantial, Many companies have financial hardship programs for patients who qualify, so please ask about that if you might need a hand. If you have any questions about your supplies or your potential out-of-pocket costs, or if you need to place an order for a refill of supplies (typically monthly), please call the company directly.     Your  is Angela    Follow up with Dr Carreno In 1 week(s) in the wound care center.     Wound Care Center Information: Should you experience any significant changes in your wound(s) or have questions about your wound care, please contact the [facility] at [phone] Monday  - Thursday 8:00 am - 4:00 pm and Friday 8:00 am - 1:00pm. If you need help with your wound outside these hours and cannot wait until we are again available, contact your PCP or go to the hospital emergency room.     PLEASE NOTE: IF YOU ARE UNABLE TO OBTAIN WOUND SUPPLIES, CONTINUE TO USE THE SUPPLIES YOU HAVE AVAILABLE UNTIL YOU ARE ABLE TO REACH US. IT IS MOST IMPORTANT TO KEEP THE WOUND COVERED AT ALL TIMES.    Patient Experience    Thank you for trusting us with your care.  You may receive a survey from a company called [email or web address] asking for your feedback.  We would appreciate it if you took a few minutes to share your experience.  Your input is very valuable to us.

## 2025-03-21 NOTE — PROGRESS NOTES
Multilayer Compression Wrap   Below the Knee    NAME:  Fani Mar  YOB: 1975  MEDICAL RECORD NUMBER:  [id number]  DATE:  3/21/2025    Multilayer compression wrap: Removed old Multilayer wrap if indicated and wash leg with mild soap/water.  Applied moisturizing agent to dry skin as needed.   Applied primary and secondary dressing as ordered.  Applied multilayered dressing below the knee to right lower leg.  Applied multilayered dressing below the knee to left lower leg.  Instructed patient/caregiver not to remove dressing and to keep it clean and dry.   Instructed patient/caregiver on complications to report to provider, such as pain, numbness in toes, heavy drainage, and slippage of dressing.  Instructed patient on purpose of compression dressing and on activity and exercise recommendations.     Applied  2 layer wrap and ace wrap from toes to knee overlapping each time    Electronically signed by Georgie Amador RN on 3/21/2025 at 8:56 AM

## 2025-03-28 ENCOUNTER — HOSPITAL ENCOUNTER (OUTPATIENT)
Dept: WOUND CARE | Age: 50
Discharge: HOME | End: 2025-03-28
Attending: PODIATRIST
Payer: COMMERCIAL

## 2025-03-28 VITALS
SYSTOLIC BLOOD PRESSURE: 155 MMHG | HEART RATE: 100 BPM | DIASTOLIC BLOOD PRESSURE: 87 MMHG | TEMPERATURE: 96.2 F | RESPIRATION RATE: 17 BRPM

## 2025-03-28 DIAGNOSIS — I89.0 LYMPHEDEMA: ICD-10-CM

## 2025-03-28 DIAGNOSIS — I73.89 OTHER SPECIFIED PERIPHERAL VASCULAR DISEASES: Primary | ICD-10-CM

## 2025-03-28 DIAGNOSIS — R60.0 EDEMA LEG: ICD-10-CM

## 2025-03-28 DIAGNOSIS — L97.223 NON-PRESSURE CHRONIC ULCER OF LEFT CALF WITH NECROSIS OF MUSCLE (HCC): ICD-10-CM

## 2025-03-28 PROCEDURE — 11042 DBRDMT SUBQ TIS 1ST 20SQCM/<: CPT

## 2025-03-28 PROCEDURE — 29581 APPL MULTLAYER CMPRN SYS LEG: CPT

## 2025-03-28 RX ORDER — LIDOCAINE 40 MG/G
CREAM TOPICAL ONCE
OUTPATIENT
Start: 2025-03-28 | End: 2025-03-28

## 2025-03-28 RX ORDER — SILVER SULFADIAZINE 10 MG/G
CREAM TOPICAL ONCE
OUTPATIENT
Start: 2025-03-28 | End: 2025-03-28

## 2025-03-28 RX ORDER — LIDOCAINE HYDROCHLORIDE 20 MG/ML
JELLY TOPICAL ONCE
OUTPATIENT
Start: 2025-03-28 | End: 2025-03-28

## 2025-03-28 RX ORDER — LIDOCAINE 50 MG/G
OINTMENT TOPICAL ONCE
OUTPATIENT
Start: 2025-03-28 | End: 2025-03-28

## 2025-03-28 RX ORDER — BACITRACIN ZINC AND POLYMYXIN B SULFATE 500; 1000 [USP'U]/G; [USP'U]/G
OINTMENT TOPICAL ONCE
OUTPATIENT
Start: 2025-03-28 | End: 2025-03-28

## 2025-03-28 RX ORDER — NEOMYCIN/BACITRACIN/POLYMYXINB 3.5-400-5K
OINTMENT (GRAM) TOPICAL ONCE
OUTPATIENT
Start: 2025-03-28 | End: 2025-03-28

## 2025-03-28 RX ORDER — LIDOCAINE HYDROCHLORIDE 40 MG/ML
SOLUTION TOPICAL ONCE
OUTPATIENT
Start: 2025-03-28 | End: 2025-03-28

## 2025-03-28 RX ORDER — CLOBETASOL PROPIONATE 0.5 MG/G
OINTMENT TOPICAL ONCE
OUTPATIENT
Start: 2025-03-28 | End: 2025-03-28

## 2025-03-28 RX ORDER — GINSENG 100 MG
CAPSULE ORAL ONCE
OUTPATIENT
Start: 2025-03-28 | End: 2025-03-28

## 2025-03-28 RX ORDER — SODIUM CHLOR/HYPOCHLOROUS ACID 0.033 %
SOLUTION, IRRIGATION IRRIGATION ONCE
OUTPATIENT
Start: 2025-03-28 | End: 2025-03-28

## 2025-03-28 RX ORDER — LIDOCAINE 40 MG/G
CREAM TOPICAL ONCE
Status: DISCONTINUED | OUTPATIENT
Start: 2025-03-28 | End: 2025-03-29 | Stop reason: HOSPADM

## 2025-03-28 RX ORDER — GENTAMICIN SULFATE 1 MG/G
OINTMENT TOPICAL ONCE
OUTPATIENT
Start: 2025-03-28 | End: 2025-03-28

## 2025-03-28 RX ORDER — BETAMETHASONE DIPROPIONATE 0.5 MG/G
CREAM TOPICAL ONCE
OUTPATIENT
Start: 2025-03-28 | End: 2025-03-28

## 2025-03-28 RX ORDER — MUPIROCIN 20 MG/G
OINTMENT TOPICAL ONCE
OUTPATIENT
Start: 2025-03-28 | End: 2025-03-28

## 2025-03-28 RX ORDER — TRIAMCINOLONE ACETONIDE 1 MG/G
OINTMENT TOPICAL ONCE
OUTPATIENT
Start: 2025-03-28 | End: 2025-03-28

## 2025-03-28 NOTE — PLAN OF CARE
Discharge instructions given.  Patient verbalized understanding.  Return to [facility] in 1 week(s).

## 2025-03-28 NOTE — PATIENT INSTRUCTIONS
[facility] [place]  [address]   [place], [place] [address]  Telephone: [phone]     FAX [phone]    Discharge Instructions    Important reminders:    **If you have any signs and symptoms of illness (Cough, fever, congestion, nausea, vomiting, diarrhea, etc.) please call the wound care center prior to your appointment.    1. Increase Protein intake for optimal wound healing  2. No added salt to reduce any swelling  3. If diabetic, maintain good glucose control  4. If you smoke, smoking prohibits wound healing, we ask that you refrain from smoking.  5. When taking antibiotics take the entire prescription as ordered. Do not stop taking until medication is all gone unless otherwise instructed.   6. Exercise as tolerated.   7. Keep weight off wounds and reposition every 2 hours if applicable.  8. If wound(s) is on your lower extremity, elevate legs to the level of the heart or above for 30 minutes 4-5 times a day and/or when sitting. Avoid standing for long periods of time.   9. Do not get wounds wet in bath or shower unless otherwise instructed by your physician. If your wound is on your foot or leg, you may purchase a cast bag. Please ask at the pharmacy.      If Vascular testing is ordered, please call [phone] to schedule.    Vascular tests ordered by Wound Care Physicians may take up to 2 hours to complete. Please keep that in mind when scheduling.     If Vascular testing is scheduled, please bring supplies to replace your dressing after testing is done. The vascular department does not stock supplies.     Wound: Left leg     With each dressing change, rinse wounds with 0.9% Saline. (May use wound wash or soft contact solution. Both can be purchased at a local drug store). If unable to obtain saline, may use a gentle soap and water.    Dressing care:  a vitamin D supplement. Look at [facility] for a new recliner. Take diuretic as ordered. Do not get leg wet in shower. Can get a

## 2025-03-28 NOTE — PROGRESS NOTES
[facility]  Progress Note and Procedure Note      Fani Mar  AGE: 49 y.o.   GENDER: female  : 1975  TODAY'S DATE:  3/28/2025    Subjective:     Chief Complaint   Patient presents with    Wound Check     BLEs         HISTORY of PRESENT ILLNESS HPI     Fani Mar is a 49 y.o. female who presents today for wound evaluation.   History of Wound: Patient elects to having wound for at least 4 weeks on the back of her leg.  She states it is a venous stasis ulceration.      She has left the dressing intact.  She feels that stress is making her itch her legs.  She also feels like the dressing may have been causing some of the itching this week.  She has no other complaints at this time.  She denies current nausea, vomiting, fever, chills, shortness of breath or chest pain.    Wound Pain:  intermittent  Severity:  3 / 10   Wound Type:  venous  Modifying Factors:  edema, venous stasis, and lymphedema  Associated Signs/Symptoms:  edema, erythema, drainage, and odor        PAST MEDICAL HISTORY        Diagnosis Date    Anxiety     Depression     Hypertension     PCOS (polycystic ovarian syndrome)        PAST SURGICAL HISTORY    Past Surgical History:   Procedure Laterality Date    APPENDECTOMY      TONSILLECTOMY AND ADENOIDECTOMY      TYMPANOSTOMY TUBE PLACEMENT         FAMILY HISTORY    Family History   Problem Relation Age of Onset    Diabetes Mother     Dementia Mother     High Blood Pressure Father     Pancreatic Cancer Father     Cancer Father         Pancreatic    Coronary Art Dis Sister         Congestive heart failure with pulmonary hypertension    Pancreatic Cancer Sister         PULM HTN    Cancer Brother 34        Brain tumor       SOCIAL HISTORY    Social History     Tobacco Use    Smoking status: Never     Passive exposure: Never    Smokeless tobacco: Never   Substance Use Topics    Alcohol use: Yes     Comment: 1 glass on holidays/spec. occassions, avg < 1/month    Drug use:

## 2025-04-03 NOTE — PATIENT INSTRUCTIONS
[facility]  [address]   [place], [place] [address]  Telephone: [phone]     FAX [phone]    Discharge Instructions    Important reminders:    **If you have any signs and symptoms of illness (Cough, fever, congestion, nausea, vomiting, diarrhea, etc.) please call the wound care center prior to your appointment.    1. Increase Protein intake for optimal wound healing  2. No added salt to reduce any swelling  3. If diabetic, maintain good glucose control  4. If you smoke, smoking prohibits wound healing, we ask that you refrain from smoking.  5. When taking antibiotics take the entire prescription as ordered. Do not stop taking until medication is all gone unless otherwise instructed.   6. Exercise as tolerated.   7. Keep weight off wounds and reposition every 2 hours if applicable.  8. If wound(s) is on your lower extremity, elevate legs to the level of the heart or above for 30 minutes 4-5 times a day and/or when sitting. Avoid standing for long periods of time.   9. Do not get wounds wet in bath or shower unless otherwise instructed by your physician. If your wound is on your foot or leg, you may purchase a cast bag. Please ask at the pharmacy.      If Vascular testing is ordered, please call [phone] to schedule.    Vascular tests ordered by Wound Care Physicians may take up to 2 hours to complete. Please keep that in mind when scheduling.     If Vascular testing is scheduled, please bring supplies to replace your dressing after testing is done. The vascular department does not stock supplies.     Wound: Left leg     With each dressing change, rinse wounds with 0.9% Saline. (May use wound wash or soft contact solution. Both can be purchased at a local drug store). If unable to obtain saline, may use a gentle soap and water.    Dressing care:  a vitamin D supplement. Look at [facility] for a new recliner. Take diuretic as ordered. Do not get leg wet in shower. Can get a

## 2025-04-04 ENCOUNTER — HOSPITAL ENCOUNTER (OUTPATIENT)
Dept: WOUND CARE | Age: 50
Discharge: HOME | End: 2025-04-04
Attending: PODIATRIST
Payer: COMMERCIAL

## 2025-04-04 VITALS — HEART RATE: 95 BPM | SYSTOLIC BLOOD PRESSURE: 149 MMHG | RESPIRATION RATE: 16 BRPM | DIASTOLIC BLOOD PRESSURE: 84 MMHG

## 2025-04-04 DIAGNOSIS — L97.223 NON-PRESSURE CHRONIC ULCER OF LEFT CALF WITH NECROSIS OF MUSCLE (HCC): ICD-10-CM

## 2025-04-04 DIAGNOSIS — I73.89 OTHER SPECIFIED PERIPHERAL VASCULAR DISEASES: Primary | ICD-10-CM

## 2025-04-04 DIAGNOSIS — I89.0 LYMPHEDEMA: ICD-10-CM

## 2025-04-04 DIAGNOSIS — R60.0 EDEMA LEG: ICD-10-CM

## 2025-04-04 PROCEDURE — 29581 APPL MULTLAYER CMPRN SYS LEG: CPT

## 2025-04-04 PROCEDURE — 11042 DBRDMT SUBQ TIS 1ST 20SQCM/<: CPT

## 2025-04-04 RX ORDER — LIDOCAINE HYDROCHLORIDE 20 MG/ML
JELLY TOPICAL ONCE
OUTPATIENT
Start: 2025-04-04 | End: 2025-04-04

## 2025-04-04 RX ORDER — MUPIROCIN 20 MG/G
OINTMENT TOPICAL ONCE
OUTPATIENT
Start: 2025-04-04 | End: 2025-04-04

## 2025-04-04 RX ORDER — BETAMETHASONE DIPROPIONATE 0.5 MG/G
CREAM TOPICAL ONCE
OUTPATIENT
Start: 2025-04-04 | End: 2025-04-04

## 2025-04-04 RX ORDER — LIDOCAINE 40 MG/G
CREAM TOPICAL ONCE
OUTPATIENT
Start: 2025-04-04 | End: 2025-04-04

## 2025-04-04 RX ORDER — LIDOCAINE 40 MG/G
CREAM TOPICAL ONCE
Status: DISCONTINUED | OUTPATIENT
Start: 2025-04-04 | End: 2025-04-05 | Stop reason: HOSPADM

## 2025-04-04 RX ORDER — SILVER SULFADIAZINE 10 MG/G
CREAM TOPICAL ONCE
OUTPATIENT
Start: 2025-04-04 | End: 2025-04-04

## 2025-04-04 RX ORDER — SODIUM CHLOR/HYPOCHLOROUS ACID 0.033 %
SOLUTION, IRRIGATION IRRIGATION ONCE
OUTPATIENT
Start: 2025-04-04 | End: 2025-04-04

## 2025-04-04 RX ORDER — GENTAMICIN SULFATE 1 MG/G
OINTMENT TOPICAL ONCE
OUTPATIENT
Start: 2025-04-04 | End: 2025-04-04

## 2025-04-04 RX ORDER — CLOBETASOL PROPIONATE 0.5 MG/G
OINTMENT TOPICAL ONCE
OUTPATIENT
Start: 2025-04-04 | End: 2025-04-04

## 2025-04-04 RX ORDER — GINSENG 100 MG
CAPSULE ORAL ONCE
OUTPATIENT
Start: 2025-04-04 | End: 2025-04-04

## 2025-04-04 RX ORDER — NEOMYCIN/BACITRACIN/POLYMYXINB 3.5-400-5K
OINTMENT (GRAM) TOPICAL ONCE
OUTPATIENT
Start: 2025-04-04 | End: 2025-04-04

## 2025-04-04 RX ORDER — LIDOCAINE HYDROCHLORIDE 40 MG/ML
SOLUTION TOPICAL ONCE
OUTPATIENT
Start: 2025-04-04 | End: 2025-04-04

## 2025-04-04 RX ORDER — LIDOCAINE 50 MG/G
OINTMENT TOPICAL ONCE
OUTPATIENT
Start: 2025-04-04 | End: 2025-04-04

## 2025-04-04 RX ORDER — BACITRACIN ZINC AND POLYMYXIN B SULFATE 500; 1000 [USP'U]/G; [USP'U]/G
OINTMENT TOPICAL ONCE
OUTPATIENT
Start: 2025-04-04 | End: 2025-04-04

## 2025-04-04 RX ORDER — TRIAMCINOLONE ACETONIDE 1 MG/G
OINTMENT TOPICAL ONCE
OUTPATIENT
Start: 2025-04-04 | End: 2025-04-04

## 2025-04-04 NOTE — PROGRESS NOTES
[facility]  Progress Note and Procedure Note      Fani Mar  AGE: 49 y.o.   GENDER: female  : 1975  TODAY'S DATE:  2025    Subjective:     Chief Complaint   Patient presents with    Wound Check     Right lower leg, Left lower leg         HISTORY of PRESENT ILLNESS HPI     Fani Mar is a 49 y.o. female who presents today for wound evaluation.   History of Wound: Patient elects to having wound for at least 4 weeks on the back of her leg.  She states it is a venous stasis ulceration.      She has left the dressing intact.  She feels that stress is making her itch her legs.  She also feels like the dressing may have been causing some of the itching this week.  She has no other complaints at this time.  She denies current nausea, vomiting, fever, chills, shortness of breath or chest pain.    Wound Pain:  intermittent  Severity:  3 / 10   Wound Type:  venous  Modifying Factors:  edema, venous stasis, and lymphedema  Associated Signs/Symptoms:  edema, erythema, drainage, and odor        PAST MEDICAL HISTORY        Diagnosis Date    Anxiety     Depression     Hypertension     PCOS (polycystic ovarian syndrome)        PAST SURGICAL HISTORY    Past Surgical History:   Procedure Laterality Date    APPENDECTOMY      TONSILLECTOMY AND ADENOIDECTOMY      TYMPANOSTOMY TUBE PLACEMENT         FAMILY HISTORY    Family History   Problem Relation Age of Onset    Diabetes Mother     Dementia Mother     High Blood Pressure Father     Pancreatic Cancer Father     Cancer Father         Pancreatic    Coronary Art Dis Sister         Congestive heart failure with pulmonary hypertension    Pancreatic Cancer Sister         PULM HTN    Cancer Brother 34        Brain tumor       SOCIAL HISTORY    Social History     Tobacco Use    Smoking status: Never     Passive exposure: Never    Smokeless tobacco: Never   Substance Use Topics    Alcohol use: Yes     Comment: 1 glass on holidays/spec. occassions,

## 2025-04-04 NOTE — PROGRESS NOTES
Multilayer Compression Wrap   Below the Knee    NAME:  Fani Mar  YOB: 1975  MEDICAL RECORD NUMBER:  [id number]  DATE:  4/4/2025    Multilayer compression wrap: Applied primary and secondary dressing as ordered.  Applied multilayered dressing below the knee to left lower leg.  Instructed patient/caregiver not to remove dressing and to keep it clean and dry.   Instructed patient/caregiver on complications to report to provider, such as pain, numbness in toes, heavy drainage, and slippage of dressing.  Instructed patient on purpose of compression dressing and on activity and exercise recommendations.     Applied  2 layer wrap from toes to knee overlapping each time    Electronically signed by GILL JIMENEZ RN on 4/4/2025 at 9:31 AM

## 2025-04-04 NOTE — PLAN OF CARE
Discharge instructions given.  Patient verbalized understanding.  Return to [facility] in 1 week(s).         Per Dr Pulliam---call patient and schedule 2 week FUV    Spoke with patient.  FUV video scheduled 10/8/21 at 10:00 am.

## 2025-04-10 NOTE — PATIENT INSTRUCTIONS
provided by:   Please note, depending on your insurance coverage, you may have out-of-pocket expenses for these supplies. Someone from the company should call you to confirm your order and discuss those potential costs before they ship your products -- please anticipate that call. If your out-of-pocket cost could be substantial, Many companies have financial hardship programs for patients who qualify, so please ask about that if you might need a hand. If you have any questions about your supplies or your potential out-of-pocket costs, or if you need to place an order for a refill of supplies (typically monthly), please call the company directly.     Your  is Angela    Follow up with Dr Carreno In 1 week(s) in the wound care center.     Wound Care Center Information: Should you experience any significant changes in your wound(s) or have questions about your wound care, please contact the [facility] at [phone] Monday  - Thursday 8:00 am - 4:00 pm and Friday 8:00 am - 1:00pm. If you need help with your wound outside these hours and cannot wait until we are again available, contact your PCP or go to the hospital emergency room.     PLEASE NOTE: IF YOU ARE UNABLE TO OBTAIN WOUND SUPPLIES, CONTINUE TO USE THE SUPPLIES YOU HAVE AVAILABLE UNTIL YOU ARE ABLE TO REACH US. IT IS MOST IMPORTANT TO KEEP THE WOUND COVERED AT ALL TIMES.    Patient Experience    Thank you for trusting us with your care.  You may receive a survey from a company called [employer] asking for your feedback.  We would appreciate it if you took a few minutes to share your experience.  Your input is very valuable to us.

## 2025-04-11 ENCOUNTER — HOSPITAL ENCOUNTER (OUTPATIENT)
Dept: WOUND CARE | Age: 50
Discharge: HOME | End: 2025-04-11
Attending: PODIATRIST
Payer: COMMERCIAL

## 2025-04-11 DIAGNOSIS — L97.223 NON-PRESSURE CHRONIC ULCER OF LEFT CALF WITH NECROSIS OF MUSCLE (HCC): ICD-10-CM

## 2025-04-11 DIAGNOSIS — I89.0 LYMPHEDEMA: ICD-10-CM

## 2025-04-11 DIAGNOSIS — R60.0 EDEMA LEG: ICD-10-CM

## 2025-04-11 DIAGNOSIS — I73.89 OTHER SPECIFIED PERIPHERAL VASCULAR DISEASES: Primary | ICD-10-CM

## 2025-04-11 PROCEDURE — 11042 DBRDMT SUBQ TIS 1ST 20SQCM/<: CPT

## 2025-04-11 PROCEDURE — 29581 APPL MULTLAYER CMPRN SYS LEG: CPT

## 2025-04-11 RX ORDER — GINSENG 100 MG
CAPSULE ORAL ONCE
OUTPATIENT
Start: 2025-04-11 | End: 2025-04-11

## 2025-04-11 RX ORDER — MUPIROCIN 20 MG/G
OINTMENT TOPICAL ONCE
OUTPATIENT
Start: 2025-04-11 | End: 2025-04-11

## 2025-04-11 RX ORDER — BETAMETHASONE DIPROPIONATE 0.5 MG/G
CREAM TOPICAL ONCE
OUTPATIENT
Start: 2025-04-11 | End: 2025-04-11

## 2025-04-11 RX ORDER — NEOMYCIN/BACITRACIN/POLYMYXINB 3.5-400-5K
OINTMENT (GRAM) TOPICAL ONCE
OUTPATIENT
Start: 2025-04-11 | End: 2025-04-11

## 2025-04-11 RX ORDER — LIDOCAINE 50 MG/G
OINTMENT TOPICAL ONCE
OUTPATIENT
Start: 2025-04-11 | End: 2025-04-11

## 2025-04-11 RX ORDER — CLOBETASOL PROPIONATE 0.5 MG/G
OINTMENT TOPICAL ONCE
OUTPATIENT
Start: 2025-04-11 | End: 2025-04-11

## 2025-04-11 RX ORDER — GENTAMICIN SULFATE 1 MG/G
OINTMENT TOPICAL ONCE
OUTPATIENT
Start: 2025-04-11 | End: 2025-04-11

## 2025-04-11 RX ORDER — LIDOCAINE HYDROCHLORIDE 20 MG/ML
JELLY TOPICAL ONCE
OUTPATIENT
Start: 2025-04-11 | End: 2025-04-11

## 2025-04-11 RX ORDER — LIDOCAINE 40 MG/G
CREAM TOPICAL ONCE
Status: DISCONTINUED | OUTPATIENT
Start: 2025-04-11 | End: 2025-04-12 | Stop reason: HOSPADM

## 2025-04-11 RX ORDER — BACITRACIN ZINC AND POLYMYXIN B SULFATE 500; 1000 [USP'U]/G; [USP'U]/G
OINTMENT TOPICAL ONCE
OUTPATIENT
Start: 2025-04-11 | End: 2025-04-11

## 2025-04-11 RX ORDER — LIDOCAINE HYDROCHLORIDE 40 MG/ML
SOLUTION TOPICAL ONCE
OUTPATIENT
Start: 2025-04-11 | End: 2025-04-11

## 2025-04-11 RX ORDER — SILVER SULFADIAZINE 10 MG/G
CREAM TOPICAL ONCE
OUTPATIENT
Start: 2025-04-11 | End: 2025-04-11

## 2025-04-11 RX ORDER — LIDOCAINE 40 MG/G
CREAM TOPICAL ONCE
OUTPATIENT
Start: 2025-04-11 | End: 2025-04-11

## 2025-04-11 RX ORDER — TRIAMCINOLONE ACETONIDE 1 MG/G
OINTMENT TOPICAL ONCE
OUTPATIENT
Start: 2025-04-11 | End: 2025-04-11

## 2025-04-11 RX ORDER — SODIUM CHLOR/HYPOCHLOROUS ACID 0.033 %
SOLUTION, IRRIGATION IRRIGATION ONCE
OUTPATIENT
Start: 2025-04-11 | End: 2025-04-11

## 2025-04-11 NOTE — PROGRESS NOTES
Multilayer Compression Wrap   Below the Knee    NAME:  Fani Mar  YOB: 1975  MEDICAL RECORD NUMBER:  [id number]  DATE:  4/11/2025    Multilayer compression wrap: Removed old Multilayer wrap if indicated and wash leg with mild soap/water.  Applied moisturizing agent to dry skin as needed.   Applied primary and secondary dressing as ordered.  Applied multilayered dressing below the knee to left lower leg.  Instructed patient/caregiver not to remove dressing and to keep it clean and dry.   Instructed patient/caregiver on complications to report to provider, such as pain, numbness in toes, heavy drainage, and slippage of dressing.  Instructed patient on purpose of compression dressing and on activity and exercise recommendations.     Applied  2 layer wrap and ace wrap from toes to knee overlapping each time    Electronically signed by Georgie Amador RN on 4/11/2025 at 10:42 AM        
  Wound Assessment Bleeding 04/11/25 0906   Drainage Amount Moderate (25-50%) 04/11/25 0847   Drainage Description Serosanguinous 04/11/25 0847   Odor None 04/11/25 0847   Maria De Jesus-wound Assessment Intact 04/11/25 0847   Margins Attached edges 04/11/25 0847   Number of days: 168           Total Surface Area Debrided:  full-thickness 0.36cm²    Percentage of wound debrided 100%    Bleeding:  Minimal    Hemostasis Achieved:  by pressure    Procedural Pain:  3  / 10     Post Procedural Pain:  0 / 10     Response to treatment:  Well tolerated by patient.           Plan:   Patient examined and evaluated  Wound sharply debrided without incident bilateral leg  Wound improving with increased granulation tissue decrease fibrotic tissue  Keep leg elevated at all times when not active  She is working on getting a lift chair  Multilayer compression dressing  Continue Lotrisone to itchy areas.  The nature of the patient's condition was explained in depth. The patient was informed that their compliance to the treatment plan is paramount to successful healing and prevention of further ulceration and/or infection     Discharge Treatment  follow-up Monday for bandage change leave dressing intact do not get wet do not soak    Written Patient Discharge Instructions Given            Electronically signed by Siddhartha Carreno DPM on 4/11/2025 at 10:02 AM

## 2025-04-15 ENCOUNTER — TELEPHONE (OUTPATIENT)
Dept: FAMILY MEDICINE CLINIC | Age: 50
End: 2025-04-15

## 2025-04-17 NOTE — PATIENT INSTRUCTIONS
[facility]  [address]   [place], [place] [address]  Telephone: [phone]     FAX [phone]    Discharge Instructions    Important reminders:    **If you have any signs and symptoms of illness (Cough, fever, congestion, nausea, vomiting, diarrhea, etc.) please call the wound care center prior to your appointment.    1. Increase Protein intake for optimal wound healing  2. No added salt to reduce any swelling  3. If diabetic, maintain good glucose control  4. If you smoke, smoking prohibits wound healing, we ask that you refrain from smoking.  5. When taking antibiotics take the entire prescription as ordered. Do not stop taking until medication is all gone unless otherwise instructed.   6. Exercise as tolerated.   7. Keep weight off wounds and reposition every 2 hours if applicable.  8. If wound(s) is on your lower extremity, elevate legs to the level of the heart or above for 30 minutes 4-5 times a day and/or when sitting. Avoid standing for long periods of time.   9. Do not get wounds wet in bath or shower unless otherwise instructed by your physician. If your wound is on your foot or leg, you may purchase a cast bag. Please ask at the pharmacy.      If Vascular testing is ordered, please call [phone] to schedule.    Vascular tests ordered by Wound Care Physicians may take up to 2 hours to complete. Please keep that in mind when scheduling.     If Vascular testing is scheduled, please bring supplies to replace your dressing after testing is done. The vascular department does not stock supplies.     Wound: Left leg     With each dressing change, rinse wounds with 0.9% Saline. (May use wound wash or soft contact solution. Both can be purchased at a local drug store). If unable to obtain saline, may use a gentle soap and water.    Dressing care:  a vitamin D supplement. Look at [facility] for a new recliner. Take diuretic as ordered. Do not get leg wet in shower. Can get a

## 2025-04-18 ENCOUNTER — HOSPITAL ENCOUNTER (OUTPATIENT)
Dept: WOUND CARE | Age: 50
Discharge: HOME | End: 2025-04-18
Attending: PODIATRIST
Payer: COMMERCIAL

## 2025-04-18 VITALS
HEART RATE: 102 BPM | TEMPERATURE: 96.5 F | RESPIRATION RATE: 17 BRPM | DIASTOLIC BLOOD PRESSURE: 92 MMHG | SYSTOLIC BLOOD PRESSURE: 168 MMHG

## 2025-04-18 DIAGNOSIS — I73.89 OTHER SPECIFIED PERIPHERAL VASCULAR DISEASES: Primary | ICD-10-CM

## 2025-04-18 DIAGNOSIS — I89.0 LYMPHEDEMA: ICD-10-CM

## 2025-04-18 DIAGNOSIS — L97.223 NON-PRESSURE CHRONIC ULCER OF LEFT CALF WITH NECROSIS OF MUSCLE (HCC): ICD-10-CM

## 2025-04-18 DIAGNOSIS — R60.0 EDEMA LEG: ICD-10-CM

## 2025-04-18 PROCEDURE — 11042 DBRDMT SUBQ TIS 1ST 20SQCM/<: CPT

## 2025-04-18 PROCEDURE — 29581 APPL MULTLAYER CMPRN SYS LEG: CPT

## 2025-04-18 RX ORDER — LIDOCAINE HYDROCHLORIDE 20 MG/ML
JELLY TOPICAL ONCE
OUTPATIENT
Start: 2025-04-18 | End: 2025-04-18

## 2025-04-18 RX ORDER — SODIUM CHLOR/HYPOCHLOROUS ACID 0.033 %
SOLUTION, IRRIGATION IRRIGATION ONCE
OUTPATIENT
Start: 2025-04-18 | End: 2025-04-18

## 2025-04-18 RX ORDER — SILVER SULFADIAZINE 10 MG/G
CREAM TOPICAL ONCE
OUTPATIENT
Start: 2025-04-18 | End: 2025-04-18

## 2025-04-18 RX ORDER — GENTAMICIN SULFATE 1 MG/G
OINTMENT TOPICAL ONCE
OUTPATIENT
Start: 2025-04-18 | End: 2025-04-18

## 2025-04-18 RX ORDER — LIDOCAINE 40 MG/G
CREAM TOPICAL ONCE
Status: DISCONTINUED | OUTPATIENT
Start: 2025-04-18 | End: 2025-04-19 | Stop reason: HOSPADM

## 2025-04-18 RX ORDER — BACITRACIN ZINC AND POLYMYXIN B SULFATE 500; 1000 [USP'U]/G; [USP'U]/G
OINTMENT TOPICAL ONCE
OUTPATIENT
Start: 2025-04-18 | End: 2025-04-18

## 2025-04-18 RX ORDER — LIDOCAINE HYDROCHLORIDE 40 MG/ML
SOLUTION TOPICAL ONCE
OUTPATIENT
Start: 2025-04-18 | End: 2025-04-18

## 2025-04-18 RX ORDER — LIDOCAINE 40 MG/G
CREAM TOPICAL ONCE
OUTPATIENT
Start: 2025-04-18 | End: 2025-04-18

## 2025-04-18 RX ORDER — CLOBETASOL PROPIONATE 0.5 MG/G
OINTMENT TOPICAL ONCE
OUTPATIENT
Start: 2025-04-18 | End: 2025-04-18

## 2025-04-18 RX ORDER — LIDOCAINE 50 MG/G
OINTMENT TOPICAL ONCE
OUTPATIENT
Start: 2025-04-18 | End: 2025-04-18

## 2025-04-18 RX ORDER — MUPIROCIN 20 MG/G
OINTMENT TOPICAL ONCE
OUTPATIENT
Start: 2025-04-18 | End: 2025-04-18

## 2025-04-18 RX ORDER — TRIAMCINOLONE ACETONIDE 1 MG/G
OINTMENT TOPICAL ONCE
OUTPATIENT
Start: 2025-04-18 | End: 2025-04-18

## 2025-04-18 RX ORDER — BETAMETHASONE DIPROPIONATE 0.5 MG/G
CREAM TOPICAL ONCE
OUTPATIENT
Start: 2025-04-18 | End: 2025-04-18

## 2025-04-18 RX ORDER — GINSENG 100 MG
CAPSULE ORAL ONCE
OUTPATIENT
Start: 2025-04-18 | End: 2025-04-18

## 2025-04-18 RX ORDER — NEOMYCIN/BACITRACIN/POLYMYXINB 3.5-400-5K
OINTMENT (GRAM) TOPICAL ONCE
OUTPATIENT
Start: 2025-04-18 | End: 2025-04-18

## 2025-04-18 NOTE — PROGRESS NOTES
[facility]  Progress Note and Procedure Note      Fani Mar  AGE: 49 y.o.   GENDER: female  : 1975  TODAY'S DATE:  2025    Subjective:     Chief Complaint   Patient presents with    Wound Check     BLE         HISTORY of PRESENT ILLNESS HPI     Fani Mar is a 49 y.o. female who presents today for wound evaluation.   History of Wound: Patient elects to having wound for at least 4 weeks on the back of her leg.  She states it is a venous stasis ulceration.      She has left the dressing intact.  She states that the leg is doing better this week..  She has no other complaints at this time.  She denies current nausea, vomiting, fever, chills, shortness of breath or chest pain.    Wound Pain:  intermittent  Severity:  3 / 10   Wound Type:  venous  Modifying Factors:  edema, venous stasis, and lymphedema  Associated Signs/Symptoms:  edema, erythema, drainage, and odor        PAST MEDICAL HISTORY        Diagnosis Date    Anxiety     Depression     Hypertension     PCOS (polycystic ovarian syndrome)        PAST SURGICAL HISTORY    Past Surgical History:   Procedure Laterality Date    APPENDECTOMY      TONSILLECTOMY AND ADENOIDECTOMY      TYMPANOSTOMY TUBE PLACEMENT         FAMILY HISTORY    Family History   Problem Relation Age of Onset    Diabetes Mother     Dementia Mother     High Blood Pressure Father     Pancreatic Cancer Father     Cancer Father         Pancreatic    Coronary Art Dis Sister         Congestive heart failure with pulmonary hypertension    Pancreatic Cancer Sister         PULM HTN    Cancer Brother 34        Brain tumor       SOCIAL HISTORY    Social History     Tobacco Use    Smoking status: Never     Passive exposure: Never    Smokeless tobacco: Never   Substance Use Topics    Alcohol use: Yes     Comment: 1 glass on holidays/spec. occassions, avg < 1/month    Drug use: Never       ALLERGIES    No Known Allergies    MEDICATIONS    Current Outpatient

## 2025-04-18 NOTE — PROGRESS NOTES
Multilayer Compression Wrap   Below the Knee    NAME:  Fani Mar  YOB: 1975  MEDICAL RECORD NUMBER:  [id number]  DATE:  4/18/2025    Multilayer compression wrap: Removed old Multilayer wrap if indicated and wash leg with mild soap/water.  Applied moisturizing agent to dry skin as needed.   Applied primary and secondary dressing as ordered.  Applied multilayered dressing below the knee to left lower leg.  Instructed patient/caregiver not to remove dressing and to keep it clean and dry.   Instructed patient/caregiver on complications to report to provider, such as pain, numbness in toes, heavy drainage, and slippage of dressing.  Instructed patient on purpose of compression dressing and on activity and exercise recommendations.     Applied  2 layer wrap from toes to knee overlapping each time    Electronically signed by Tiffanie Wood RN on 4/18/2025 at 9:51 AM

## 2025-04-21 DIAGNOSIS — I87.2 VENOUS INSUFFICIENCY OF BOTH LOWER EXTREMITIES: ICD-10-CM

## 2025-04-21 RX ORDER — FUROSEMIDE 20 MG/1
TABLET ORAL
Qty: 45 TABLET | Refills: 0 | Status: SHIPPED | OUTPATIENT
Start: 2025-04-21

## 2025-04-24 NOTE — PATIENT INSTRUCTIONS
[facility]  [address]   [place], [place] [address]  Telephone: [phone]     FAX [phone]    Discharge Instructions    Important reminders:    **If you have any signs and symptoms of illness (Cough, fever, congestion, nausea, vomiting, diarrhea, etc.) please call the wound care center prior to your appointment.    1. Increase Protein intake for optimal wound healing  2. No added salt to reduce any swelling  3. If diabetic, maintain good glucose control  4. If you smoke, smoking prohibits wound healing, we ask that you refrain from smoking.  5. When taking antibiotics take the entire prescription as ordered. Do not stop taking until medication is all gone unless otherwise instructed.   6. Exercise as tolerated.   7. Keep weight off wounds and reposition every 2 hours if applicable.  8. If wound(s) is on your lower extremity, elevate legs to the level of the heart or above for 30 minutes 4-5 times a day and/or when sitting. Avoid standing for long periods of time.   9. Do not get wounds wet in bath or shower unless otherwise instructed by your physician. If your wound is on your foot or leg, you may purchase a cast bag. Please ask at the pharmacy.      If Vascular testing is ordered, please call [phone] to schedule.    Vascular tests ordered by Wound Care Physicians may take up to 2 hours to complete. Please keep that in mind when scheduling.     If Vascular testing is scheduled, please bring supplies to replace your dressing after testing is done. The vascular department does not stock supplies.     Wound: Left leg     With each dressing change, rinse wounds with 0.9% Saline. (May use wound wash or soft contact solution. Both can be purchased at a local drug store). If unable to obtain saline, may use a gentle soap and water.    Dressing care:  a vitamin D supplement. Look at [facility] for a new recliner. Take diuretic as ordered. Do not get leg wet in shower. Can get a

## 2025-04-25 ENCOUNTER — HOSPITAL ENCOUNTER (OUTPATIENT)
Dept: WOUND CARE | Age: 50
Discharge: HOME | End: 2025-04-25
Attending: PODIATRIST
Payer: COMMERCIAL

## 2025-04-25 VITALS — SYSTOLIC BLOOD PRESSURE: 151 MMHG | DIASTOLIC BLOOD PRESSURE: 81 MMHG | HEART RATE: 94 BPM | RESPIRATION RATE: 18 BRPM

## 2025-04-25 DIAGNOSIS — L97.223 NON-PRESSURE CHRONIC ULCER OF LEFT CALF WITH NECROSIS OF MUSCLE (HCC): ICD-10-CM

## 2025-04-25 DIAGNOSIS — R60.0 EDEMA LEG: ICD-10-CM

## 2025-04-25 DIAGNOSIS — I89.0 LYMPHEDEMA: ICD-10-CM

## 2025-04-25 DIAGNOSIS — I73.89 OTHER SPECIFIED PERIPHERAL VASCULAR DISEASES: Primary | ICD-10-CM

## 2025-04-25 PROCEDURE — 29581 APPL MULTLAYER CMPRN SYS LEG: CPT

## 2025-04-25 PROCEDURE — 11042 DBRDMT SUBQ TIS 1ST 20SQCM/<: CPT

## 2025-04-25 RX ORDER — LIDOCAINE HYDROCHLORIDE 20 MG/ML
JELLY TOPICAL ONCE
OUTPATIENT
Start: 2025-04-25 | End: 2025-04-25

## 2025-04-25 RX ORDER — BACITRACIN ZINC AND POLYMYXIN B SULFATE 500; 1000 [USP'U]/G; [USP'U]/G
OINTMENT TOPICAL ONCE
OUTPATIENT
Start: 2025-04-25 | End: 2025-04-25

## 2025-04-25 RX ORDER — SODIUM CHLOR/HYPOCHLOROUS ACID 0.033 %
SOLUTION, IRRIGATION IRRIGATION ONCE
OUTPATIENT
Start: 2025-04-25 | End: 2025-04-25

## 2025-04-25 RX ORDER — MUPIROCIN 20 MG/G
OINTMENT TOPICAL ONCE
OUTPATIENT
Start: 2025-04-25 | End: 2025-04-25

## 2025-04-25 RX ORDER — GINSENG 100 MG
CAPSULE ORAL ONCE
OUTPATIENT
Start: 2025-04-25 | End: 2025-04-25

## 2025-04-25 RX ORDER — TRIAMCINOLONE ACETONIDE 1 MG/G
OINTMENT TOPICAL ONCE
OUTPATIENT
Start: 2025-04-25 | End: 2025-04-25

## 2025-04-25 RX ORDER — LIDOCAINE 40 MG/G
CREAM TOPICAL ONCE
OUTPATIENT
Start: 2025-04-25 | End: 2025-04-25

## 2025-04-25 RX ORDER — BETAMETHASONE DIPROPIONATE 0.5 MG/G
CREAM TOPICAL ONCE
OUTPATIENT
Start: 2025-04-25 | End: 2025-04-25

## 2025-04-25 RX ORDER — SILVER SULFADIAZINE 10 MG/G
CREAM TOPICAL ONCE
OUTPATIENT
Start: 2025-04-25 | End: 2025-04-25

## 2025-04-25 RX ORDER — LIDOCAINE 40 MG/G
CREAM TOPICAL ONCE
Status: DISCONTINUED | OUTPATIENT
Start: 2025-04-25 | End: 2025-04-26 | Stop reason: HOSPADM

## 2025-04-25 RX ORDER — LIDOCAINE 50 MG/G
OINTMENT TOPICAL ONCE
OUTPATIENT
Start: 2025-04-25 | End: 2025-04-25

## 2025-04-25 RX ORDER — GENTAMICIN SULFATE 1 MG/G
OINTMENT TOPICAL ONCE
OUTPATIENT
Start: 2025-04-25 | End: 2025-04-25

## 2025-04-25 RX ORDER — NEOMYCIN/BACITRACIN/POLYMYXINB 3.5-400-5K
OINTMENT (GRAM) TOPICAL ONCE
OUTPATIENT
Start: 2025-04-25 | End: 2025-04-25

## 2025-04-25 RX ORDER — CLOBETASOL PROPIONATE 0.5 MG/G
OINTMENT TOPICAL ONCE
OUTPATIENT
Start: 2025-04-25 | End: 2025-04-25

## 2025-04-25 RX ORDER — LIDOCAINE HYDROCHLORIDE 40 MG/ML
SOLUTION TOPICAL ONCE
OUTPATIENT
Start: 2025-04-25 | End: 2025-04-25

## 2025-04-25 NOTE — PLAN OF CARE
Discharge instructions given.  Patient verbalized understanding.  Return to [facility] in 1 week(s).

## 2025-04-25 NOTE — PROGRESS NOTES
Multilayer Compression Wrap   Below the Knee    NAME:  Fani Mar  YOB: 1975  MEDICAL RECORD NUMBER:  [id number]  DATE:  4/25/2025    Multilayer compression wrap: Removed old Multilayer wrap if indicated and wash leg with mild soap/water.  Applied moisturizing agent to dry skin as needed.   Applied primary and secondary dressing as ordered.  Applied multilayered dressing below the knee to left lower leg.  Instructed patient/caregiver not to remove dressing and to keep it clean and dry.   Instructed patient/caregiver on complications to report to provider, such as pain, numbness in toes, heavy drainage, and slippage of dressing.  Instructed patient on purpose of compression dressing and on activity and exercise recommendations.     Applied  2 layer wrap from toes to knee overlapping each time    Electronically signed by Georgie Amador RN on 4/25/2025 at 9:07 AM        
bilateral feet.  No gross instability noted.         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 Outpatient Wound Care Protocol    Edema leg    Relevant Medications    lidocaine (LMX) 4 % cream    Other Relevant Orders    Initiate Outpatient Wound Care Protocol    Other specified peripheral vascular diseases - Primary    Relevant Medications    lidocaine (LMX) 4 % cream    Other Relevant Orders    Initiate Outpatient Wound Care Protocol       Procedure Note    Performed by: Siddhartha Carreno DPM    Consent obtained: Yes    Time out taken:  Yes    Pain Control: Anesthetic  Anesthetic: 4% Lidocaine Cream     Debridement:Excisional Debridement    Using curette the wound was sharply debrided    down through and including the removal of epidermis, dermis, and subcutaneous tissue.        Devitalized Tissue Debrided:  fibrin, biofilm, slough, necrotic/eschar, and exudate    Wound Care Documentation:  Wound 10/25/24 Leg Left;Posterior;Lower;Lateral #1 (Active)   Wound Image   04/25/25 0829   Wound Etiology Venous 04/25/25 0829   Wound Cleansed Wound cleanser 04/25/25 0829   Wound Length (cm) 0.7 cm 04/25/25 0829   Wound Width (cm) 1.3 cm 04/25/25 0829   Wound Depth (cm) 0.1 cm 04/25/25 0829   Wound Surface Area (cm^2) 0.91 cm^2 04/25/25 0829   Change in Wound Size % (l*w) 83.75 04/25/25 0829   Wound Volume (cm^3) 0.091 cm^3 04/25/25 0829   Wound Healing % 84 04/25/25 0829   Post-Procedure Length (cm) 0.7 cm 04/25/25 0843   Post-Procedure Width (cm) 1.3 cm 04/25/25 0843   Post-Procedure Depth (cm) 0.1 cm 04/25/25 0843   Post-Procedure Surface Area (cm^2) 0.91 cm^2 04/25/25 0843   Post-Procedure Volume (cm^3) 0.091

## 2025-04-30 DIAGNOSIS — E66.813 CLASS 3 SEVERE OBESITY WITH SERIOUS COMORBIDITY AND BODY MASS INDEX (BMI) OF 60.0 TO 69.9 IN ADULT, UNSPECIFIED OBESITY TYPE (HCC): ICD-10-CM

## 2025-04-30 RX ORDER — VILAZODONE HYDROCHLORIDE 40 MG/1
40 TABLET ORAL DAILY
Qty: 60 TABLET | Refills: 0 | Status: SHIPPED | OUTPATIENT
Start: 2025-04-30 | End: 2025-06-29

## 2025-05-01 NOTE — PATIENT INSTRUCTIONS
[facility]  [address]   [place], [place] [address]  Telephone: [phone]     FAX [phone]    Discharge Instructions    Important reminders:    **If you have any signs and symptoms of illness (Cough, fever, congestion, nausea, vomiting, diarrhea, etc.) please call the wound care center prior to your appointment.    1. Increase Protein intake for optimal wound healing  2. No added salt to reduce any swelling  3. If diabetic, maintain good glucose control  4. If you smoke, smoking prohibits wound healing, we ask that you refrain from smoking.  5. When taking antibiotics take the entire prescription as ordered. Do not stop taking until medication is all gone unless otherwise instructed.   6. Exercise as tolerated.   7. Keep weight off wounds and reposition every 2 hours if applicable.  8. If wound(s) is on your lower extremity, elevate legs to the level of the heart or above for 30 minutes 4-5 times a day and/or when sitting. Avoid standing for long periods of time.   9. Do not get wounds wet in bath or shower unless otherwise instructed by your physician. If your wound is on your foot or leg, you may purchase a cast bag. Please ask at the pharmacy.      If Vascular testing is ordered, please call [phone] to schedule.    Vascular tests ordered by Wound Care Physicians may take up to 2 hours to complete. Please keep that in mind when scheduling.     If Vascular testing is scheduled, please bring supplies to replace your dressing after testing is done. The vascular department does not stock supplies.     Wound: Left leg     With each dressing change, rinse wounds with 0.9% Saline. (May use wound wash or soft contact solution. Both can be purchased at a local drug store). If unable to obtain saline, may use a gentle soap and water.    Dressing care:  a vitamin D supplement. Look at [facility] for a new recliner. Take diuretic as ordered. Do not get leg wet in shower. Can get a

## 2025-05-02 ENCOUNTER — HOSPITAL ENCOUNTER (OUTPATIENT)
Dept: WOUND CARE | Age: 50
Discharge: HOME | End: 2025-05-02
Attending: PODIATRIST
Payer: COMMERCIAL

## 2025-05-02 VITALS
TEMPERATURE: 96.5 F | HEART RATE: 102 BPM | RESPIRATION RATE: 17 BRPM | DIASTOLIC BLOOD PRESSURE: 86 MMHG | SYSTOLIC BLOOD PRESSURE: 146 MMHG

## 2025-05-02 DIAGNOSIS — I73.89 OTHER SPECIFIED PERIPHERAL VASCULAR DISEASES: Primary | ICD-10-CM

## 2025-05-02 DIAGNOSIS — I89.0 LYMPHEDEMA: ICD-10-CM

## 2025-05-02 DIAGNOSIS — R60.0 EDEMA LEG: ICD-10-CM

## 2025-05-02 DIAGNOSIS — L97.223 NON-PRESSURE CHRONIC ULCER OF LEFT CALF WITH NECROSIS OF MUSCLE (HCC): ICD-10-CM

## 2025-05-02 PROCEDURE — 11042 DBRDMT SUBQ TIS 1ST 20SQCM/<: CPT

## 2025-05-02 PROCEDURE — 29581 APPL MULTLAYER CMPRN SYS LEG: CPT

## 2025-05-02 RX ORDER — BETAMETHASONE DIPROPIONATE 0.5 MG/G
CREAM TOPICAL ONCE
OUTPATIENT
Start: 2025-05-02 | End: 2025-05-02

## 2025-05-02 RX ORDER — NEOMYCIN/BACITRACIN/POLYMYXINB 3.5-400-5K
OINTMENT (GRAM) TOPICAL ONCE
OUTPATIENT
Start: 2025-05-02 | End: 2025-05-02

## 2025-05-02 RX ORDER — MUPIROCIN 20 MG/G
OINTMENT TOPICAL ONCE
OUTPATIENT
Start: 2025-05-02 | End: 2025-05-02

## 2025-05-02 RX ORDER — LIDOCAINE 40 MG/G
CREAM TOPICAL ONCE
OUTPATIENT
Start: 2025-05-02 | End: 2025-05-02

## 2025-05-02 RX ORDER — LIDOCAINE 50 MG/G
OINTMENT TOPICAL ONCE
OUTPATIENT
Start: 2025-05-02 | End: 2025-05-02

## 2025-05-02 RX ORDER — GENTAMICIN SULFATE 1 MG/G
OINTMENT TOPICAL ONCE
OUTPATIENT
Start: 2025-05-02 | End: 2025-05-02

## 2025-05-02 RX ORDER — SILVER SULFADIAZINE 10 MG/G
CREAM TOPICAL ONCE
OUTPATIENT
Start: 2025-05-02 | End: 2025-05-02

## 2025-05-02 RX ORDER — LIDOCAINE 40 MG/G
CREAM TOPICAL ONCE
Status: DISCONTINUED | OUTPATIENT
Start: 2025-05-02 | End: 2025-05-03 | Stop reason: HOSPADM

## 2025-05-02 RX ORDER — BACITRACIN ZINC AND POLYMYXIN B SULFATE 500; 1000 [USP'U]/G; [USP'U]/G
OINTMENT TOPICAL ONCE
OUTPATIENT
Start: 2025-05-02 | End: 2025-05-02

## 2025-05-02 RX ORDER — CLOBETASOL PROPIONATE 0.5 MG/G
OINTMENT TOPICAL ONCE
OUTPATIENT
Start: 2025-05-02 | End: 2025-05-02

## 2025-05-02 RX ORDER — GINSENG 100 MG
CAPSULE ORAL ONCE
OUTPATIENT
Start: 2025-05-02 | End: 2025-05-02

## 2025-05-02 RX ORDER — LIDOCAINE HYDROCHLORIDE 20 MG/ML
JELLY TOPICAL ONCE
OUTPATIENT
Start: 2025-05-02 | End: 2025-05-02

## 2025-05-02 RX ORDER — SODIUM CHLOR/HYPOCHLOROUS ACID 0.033 %
SOLUTION, IRRIGATION IRRIGATION ONCE
OUTPATIENT
Start: 2025-05-02 | End: 2025-05-02

## 2025-05-02 RX ORDER — TRIAMCINOLONE ACETONIDE 1 MG/G
OINTMENT TOPICAL ONCE
OUTPATIENT
Start: 2025-05-02 | End: 2025-05-02

## 2025-05-02 RX ORDER — LIDOCAINE HYDROCHLORIDE 40 MG/ML
SOLUTION TOPICAL ONCE
OUTPATIENT
Start: 2025-05-02 | End: 2025-05-02

## 2025-05-02 NOTE — PLAN OF CARE
Discharge instructions given.  Patient verbalized understanding.  Return to [facility] in 1 week(s).  Nurse visit next week

## 2025-05-02 NOTE — PROGRESS NOTES
Multilayer Compression Wrap   Below the Knee    NAME:  Fani Mar  YOB: 1975  MEDICAL RECORD NUMBER:  [id number]  DATE:  5/2/2025    Multilayer compression wrap: Removed old Multilayer wrap if indicated and wash leg with mild soap/water.  Applied moisturizing agent to dry skin as needed.   Applied primary and secondary dressing as ordered.  Applied multilayered dressing below the knee to left lower leg.  Instructed patient/caregiver not to remove dressing and to keep it clean and dry.   Instructed patient/caregiver on complications to report to provider, such as pain, numbness in toes, heavy drainage, and slippage of dressing.  Instructed patient on purpose of compression dressing and on activity and exercise recommendations.     Applied  2 layer wrap from toes to knee overlapping each time    Electronically signed by Tiffanie Wood RN on 5/2/2025 at 9:29 AM       
0914   Post-Procedure Volume (cm^3) 0.028 cm^3 05/02/25 0914   Wound Assessment Bleeding 05/02/25 0914   Drainage Amount Small (< 25%) 05/02/25 0852   Drainage Description Serosanguinous 05/02/25 0852   Odor None 05/02/25 0852   Maria De Jesus-wound Assessment Intact 05/02/25 0852   Margins Attached edges 05/02/25 0852   Number of days: 189         Total Surface Area Debrided:  full-thickness 0.28cm²    Percentage of wound debrided 100%    Bleeding:  Minimal    Hemostasis Achieved:  by pressure    Procedural Pain:  3  / 10     Post Procedural Pain:  0 / 10     Response to treatment:  Well tolerated by patient.           Plan:   Patient examined and evaluated  Wound sharply debrided without incident bilateral leg  Wound improving with increased granulation tissue decrease fibrotic tissue  Keep leg elevated at all times when not active  She is working on getting a lift chair  Multilayer compression dressing  Continue Lotrisone to itchy areas.  The nature of the patient's condition was explained in depth. The patient was informed that their compliance to the treatment plan is paramount to successful healing and prevention of further ulceration and/or infection     Discharge Treatment  follow-up Monday for bandage change leave dressing intact do not get wet do not soak    Written Patient Discharge Instructions Given            Electronically signed by Siddhartha Carreno DPM on 5/2/2025 at 10:00 AM

## 2025-05-09 ENCOUNTER — HOSPITAL ENCOUNTER (OUTPATIENT)
Dept: WOUND CARE | Age: 50
Discharge: HOME | End: 2025-05-09
Attending: PODIATRIST
Payer: COMMERCIAL

## 2025-05-09 DIAGNOSIS — I73.89 OTHER SPECIFIED PERIPHERAL VASCULAR DISEASES: Primary | ICD-10-CM

## 2025-05-09 DIAGNOSIS — L97.223 NON-PRESSURE CHRONIC ULCER OF LEFT CALF WITH NECROSIS OF MUSCLE (HCC): ICD-10-CM

## 2025-05-09 DIAGNOSIS — R60.0 EDEMA LEG: ICD-10-CM

## 2025-05-09 DIAGNOSIS — I89.0 LYMPHEDEMA: ICD-10-CM

## 2025-05-09 PROCEDURE — 29581 APPL MULTLAYER CMPRN SYS LEG: CPT

## 2025-05-09 RX ORDER — LIDOCAINE HYDROCHLORIDE 40 MG/ML
SOLUTION TOPICAL ONCE
OUTPATIENT
Start: 2025-05-09 | End: 2025-05-09

## 2025-05-09 RX ORDER — SODIUM CHLOR/HYPOCHLOROUS ACID 0.033 %
SOLUTION, IRRIGATION IRRIGATION ONCE
OUTPATIENT
Start: 2025-05-09 | End: 2025-05-09

## 2025-05-09 RX ORDER — GENTAMICIN SULFATE 1 MG/G
OINTMENT TOPICAL ONCE
OUTPATIENT
Start: 2025-05-09 | End: 2025-05-09

## 2025-05-09 RX ORDER — TRIAMCINOLONE ACETONIDE 1 MG/G
OINTMENT TOPICAL ONCE
OUTPATIENT
Start: 2025-05-09 | End: 2025-05-09

## 2025-05-09 RX ORDER — NEOMYCIN/BACITRACIN/POLYMYXINB 3.5-400-5K
OINTMENT (GRAM) TOPICAL ONCE
OUTPATIENT
Start: 2025-05-09 | End: 2025-05-09

## 2025-05-09 RX ORDER — BETAMETHASONE DIPROPIONATE 0.5 MG/G
CREAM TOPICAL ONCE
OUTPATIENT
Start: 2025-05-09 | End: 2025-05-09

## 2025-05-09 RX ORDER — BACITRACIN ZINC AND POLYMYXIN B SULFATE 500; 1000 [USP'U]/G; [USP'U]/G
OINTMENT TOPICAL ONCE
OUTPATIENT
Start: 2025-05-09 | End: 2025-05-09

## 2025-05-09 RX ORDER — MUPIROCIN 20 MG/G
OINTMENT TOPICAL ONCE
OUTPATIENT
Start: 2025-05-09 | End: 2025-05-09

## 2025-05-09 RX ORDER — LIDOCAINE HYDROCHLORIDE 20 MG/ML
JELLY TOPICAL ONCE
OUTPATIENT
Start: 2025-05-09 | End: 2025-05-09

## 2025-05-09 RX ORDER — LIDOCAINE 40 MG/G
CREAM TOPICAL ONCE
OUTPATIENT
Start: 2025-05-09 | End: 2025-05-09

## 2025-05-09 RX ORDER — SILVER SULFADIAZINE 10 MG/G
CREAM TOPICAL ONCE
OUTPATIENT
Start: 2025-05-09 | End: 2025-05-09

## 2025-05-09 RX ORDER — GINSENG 100 MG
CAPSULE ORAL ONCE
OUTPATIENT
Start: 2025-05-09 | End: 2025-05-09

## 2025-05-09 RX ORDER — LIDOCAINE 50 MG/G
OINTMENT TOPICAL ONCE
OUTPATIENT
Start: 2025-05-09 | End: 2025-05-09

## 2025-05-09 RX ORDER — CLOBETASOL PROPIONATE 0.5 MG/G
OINTMENT TOPICAL ONCE
OUTPATIENT
Start: 2025-05-09 | End: 2025-05-09

## 2025-05-09 NOTE — PROGRESS NOTES
Multilayer Compression Wrap   Below the Knee    NAME:  Fani Mar  YOB: 1975  MEDICAL RECORD NUMBER:  [id number]  DATE:  5/9/2025    Multilayer compression wrap: Removed old Multilayer wrap if indicated and wash leg with mild soap/water.  Applied moisturizing agent to dry skin as needed.   Applied primary and secondary dressing as ordered.  Applied multilayered dressing below the knee to left lower leg.  Instructed patient/caregiver not to remove dressing and to keep it clean and dry.   Instructed patient/caregiver on complications to report to provider, such as pain, numbness in toes, heavy drainage, and slippage of dressing.  Instructed patient on purpose of compression dressing and on activity and exercise recommendations.     Applied  2 layer wrap and ace wrap from toes to knee overlapping each time    Electronically signed by Georgie Amador RN on 5/9/2025 at 9:17 AM

## 2025-05-09 NOTE — PATIENT INSTRUCTIONS
[facility]  [address]   [place], [place] [address]  Telephone: [phone]     FAX [phone]    Discharge Instructions    Important reminders:    **If you have any signs and symptoms of illness (Cough, fever, congestion, nausea, vomiting, diarrhea, etc.) please call the wound care center prior to your appointment.    1. Increase Protein intake for optimal wound healing  2. No added salt to reduce any swelling  3. If diabetic, maintain good glucose control  4. If you smoke, smoking prohibits wound healing, we ask that you refrain from smoking.  5. When taking antibiotics take the entire prescription as ordered. Do not stop taking until medication is all gone unless otherwise instructed.   6. Exercise as tolerated.   7. Keep weight off wounds and reposition every 2 hours if applicable.  8. If wound(s) is on your lower extremity, elevate legs to the level of the heart or above for 30 minutes 4-5 times a day and/or when sitting. Avoid standing for long periods of time.   9. Do not get wounds wet in bath or shower unless otherwise instructed by your physician. If your wound is on your foot or leg, you may purchase a cast bag. Please ask at the pharmacy.      If Vascular testing is ordered, please call [phone] to schedule.    Vascular tests ordered by Wound Care Physicians may take up to 2 hours to complete. Please keep that in mind when scheduling.     If Vascular testing is scheduled, please bring supplies to replace your dressing after testing is done. The vascular department does not stock supplies.     Wound: Left leg     With each dressing change, rinse wounds with 0.9% Saline. (May use wound wash or soft contact solution. Both can be purchased at a local drug store). If unable to obtain saline, may use a gentle soap and water.    Dressing care:  a vitamin D supplement. Look at [facility] for a new recliner. Take diuretic as ordered. Do not get leg wet in shower. Can get a

## 2025-05-16 ENCOUNTER — HOSPITAL ENCOUNTER (OUTPATIENT)
Dept: WOUND CARE | Age: 50
Discharge: HOME | End: 2025-05-16
Attending: PODIATRIST
Payer: COMMERCIAL

## 2025-05-16 VITALS — HEART RATE: 99 BPM | DIASTOLIC BLOOD PRESSURE: 84 MMHG | SYSTOLIC BLOOD PRESSURE: 157 MMHG | RESPIRATION RATE: 16 BRPM

## 2025-05-16 DIAGNOSIS — I73.89 OTHER SPECIFIED PERIPHERAL VASCULAR DISEASES: Primary | ICD-10-CM

## 2025-05-16 DIAGNOSIS — I89.0 LYMPHEDEMA: ICD-10-CM

## 2025-05-16 DIAGNOSIS — L97.223 NON-PRESSURE CHRONIC ULCER OF LEFT CALF WITH NECROSIS OF MUSCLE (HCC): ICD-10-CM

## 2025-05-16 DIAGNOSIS — R60.0 EDEMA LEG: ICD-10-CM

## 2025-05-16 PROCEDURE — 99213 OFFICE O/P EST LOW 20 MIN: CPT

## 2025-05-16 RX ORDER — LIDOCAINE HYDROCHLORIDE 20 MG/ML
JELLY TOPICAL ONCE
Status: CANCELLED | OUTPATIENT
Start: 2025-05-16 | End: 2025-05-16

## 2025-05-16 RX ORDER — CLOBETASOL PROPIONATE 0.5 MG/G
OINTMENT TOPICAL ONCE
Status: CANCELLED | OUTPATIENT
Start: 2025-05-16 | End: 2025-05-16

## 2025-05-16 RX ORDER — SILVER SULFADIAZINE 10 MG/G
CREAM TOPICAL ONCE
Status: CANCELLED | OUTPATIENT
Start: 2025-05-16 | End: 2025-05-16

## 2025-05-16 RX ORDER — GINSENG 100 MG
CAPSULE ORAL ONCE
Status: CANCELLED | OUTPATIENT
Start: 2025-05-16 | End: 2025-05-16

## 2025-05-16 RX ORDER — GENTAMICIN SULFATE 1 MG/G
OINTMENT TOPICAL ONCE
Status: CANCELLED | OUTPATIENT
Start: 2025-05-16 | End: 2025-05-16

## 2025-05-16 RX ORDER — BETAMETHASONE DIPROPIONATE 0.5 MG/G
CREAM TOPICAL ONCE
Status: CANCELLED | OUTPATIENT
Start: 2025-05-16 | End: 2025-05-16

## 2025-05-16 RX ORDER — LIDOCAINE 40 MG/G
CREAM TOPICAL ONCE
Status: DISCONTINUED | OUTPATIENT
Start: 2025-05-16 | End: 2025-05-17 | Stop reason: HOSPADM

## 2025-05-16 RX ORDER — SODIUM CHLOR/HYPOCHLOROUS ACID 0.033 %
SOLUTION, IRRIGATION IRRIGATION ONCE
Status: CANCELLED | OUTPATIENT
Start: 2025-05-16 | End: 2025-05-16

## 2025-05-16 RX ORDER — BACITRACIN ZINC AND POLYMYXIN B SULFATE 500; 1000 [USP'U]/G; [USP'U]/G
OINTMENT TOPICAL ONCE
Status: CANCELLED | OUTPATIENT
Start: 2025-05-16 | End: 2025-05-16

## 2025-05-16 RX ORDER — MUPIROCIN 20 MG/G
OINTMENT TOPICAL ONCE
Status: CANCELLED | OUTPATIENT
Start: 2025-05-16 | End: 2025-05-16

## 2025-05-16 RX ORDER — LIDOCAINE 50 MG/G
OINTMENT TOPICAL ONCE
Status: CANCELLED | OUTPATIENT
Start: 2025-05-16 | End: 2025-05-16

## 2025-05-16 RX ORDER — LIDOCAINE 40 MG/G
CREAM TOPICAL ONCE
Status: CANCELLED | OUTPATIENT
Start: 2025-05-16 | End: 2025-05-16

## 2025-05-16 RX ORDER — NEOMYCIN/BACITRACIN/POLYMYXINB 3.5-400-5K
OINTMENT (GRAM) TOPICAL ONCE
Status: CANCELLED | OUTPATIENT
Start: 2025-05-16 | End: 2025-05-16

## 2025-05-16 RX ORDER — TRIAMCINOLONE ACETONIDE 1 MG/G
OINTMENT TOPICAL ONCE
Status: CANCELLED | OUTPATIENT
Start: 2025-05-16 | End: 2025-05-16

## 2025-05-16 RX ORDER — LIDOCAINE HYDROCHLORIDE 40 MG/ML
SOLUTION TOPICAL ONCE
Status: CANCELLED | OUTPATIENT
Start: 2025-05-16 | End: 2025-05-16

## 2025-05-16 NOTE — PATIENT INSTRUCTIONS
[facility]  [address]   [place], [place] [address]  Telephone: [phone]     FAX [phone]    Discharge Instructions - Mepilex border for 3-5 days. Compression sock    Congratulations! You have completed your treatment program!    To prevent Venous Wounds from recurring again:  Elevate your legs at least parallel to the ground while sitting.  Wear your prescription support stockings everyday and remove at night while you sleep.  Replace your stockings every 4-6 months so that your legs continue to get the support they need.  Inspect your legs and feet daily and report any increased swelling, unusual redness or new wounds to your physician.  Wash your legs and feet regularly with mild soap and water and moisturize daily.  Continue to use compression pumps if prescribed by your physician.  Avoid overeating. Extra weight adds pressure on the veins in your legs.  Limit salty foods as they promote swelling or fluid retention in your legs.    Call the Wound Care Center if your wound reopens, if new wounds occur, or if you have any other questions about your follow up care [phone].

## 2025-05-16 NOTE — PROGRESS NOTES
[facility]  Progress Note and Procedure Note      Fani Mar  AGE: 49 y.o.   GENDER: female  : 1975  TODAY'S DATE:  2025    Subjective:     Chief Complaint   Patient presents with    Wound Check     Left lower extremity         HISTORY of PRESENT ILLNESS HPI     Fani Mar is a 49 y.o. female who presents today for wound evaluation.   History of Wound: Patient elects to having wound for at least 4 weeks on the back of her leg.  She states it is a venous stasis ulceration.      She has left the dressing intact.  She states that the leg is doing better this week..  She has no other complaints at this time.  She is happy that her wound is healed.  She denies current nausea, vomiting, fever, chills, shortness of breath or chest pain.    Wound Pain:  intermittent  Severity:  3 / 10   Wound Type:  venous  Modifying Factors:  edema, venous stasis, and lymphedema  Associated Signs/Symptoms:  edema, erythema, drainage, and odor        PAST MEDICAL HISTORY        Diagnosis Date    Anxiety     Depression     Hypertension     PCOS (polycystic ovarian syndrome)        PAST SURGICAL HISTORY    Past Surgical History:   Procedure Laterality Date    APPENDECTOMY      TONSILLECTOMY AND ADENOIDECTOMY      TYMPANOSTOMY TUBE PLACEMENT         FAMILY HISTORY    Family History   Problem Relation Age of Onset    Diabetes Mother     Dementia Mother     High Blood Pressure Father     Pancreatic Cancer Father     Cancer Father         Pancreatic    Coronary Art Dis Sister         Congestive heart failure with pulmonary hypertension    Pancreatic Cancer Sister         PULM HTN    Cancer Brother 34        Brain tumor       SOCIAL HISTORY    Social History     Tobacco Use    Smoking status: Never     Passive exposure: Never    Smokeless tobacco: Never   Substance Use Topics    Alcohol use: Yes     Comment: 1 glass on holidays/spec. occassions, avg < 1/month    Drug use: Never       ALLERGIES    No

## 2025-06-03 ENCOUNTER — OFFICE VISIT (OUTPATIENT)
Dept: FAMILY MEDICINE CLINIC | Age: 50
End: 2025-06-03
Payer: COMMERCIAL

## 2025-06-03 VITALS
BODY MASS INDEX: 48.82 KG/M2 | WEIGHT: 293 LBS | OXYGEN SATURATION: 97 % | DIASTOLIC BLOOD PRESSURE: 88 MMHG | HEIGHT: 65 IN | SYSTOLIC BLOOD PRESSURE: 136 MMHG

## 2025-06-03 DIAGNOSIS — Z79.899 HIGH RISK MEDICATION USE: ICD-10-CM

## 2025-06-03 DIAGNOSIS — E66.813 CLASS 3 SEVERE OBESITY WITH SERIOUS COMORBIDITY AND BODY MASS INDEX (BMI) OF 60.0 TO 69.9 IN ADULT, UNSPECIFIED OBESITY TYPE (HCC): ICD-10-CM

## 2025-06-03 DIAGNOSIS — F33.0 MILD EPISODE OF RECURRENT MAJOR DEPRESSIVE DISORDER: Primary | ICD-10-CM

## 2025-06-03 DIAGNOSIS — Z12.11 COLON CANCER SCREENING: ICD-10-CM

## 2025-06-03 DIAGNOSIS — I87.2 VENOUS INSUFFICIENCY OF BOTH LOWER EXTREMITIES: ICD-10-CM

## 2025-06-03 DIAGNOSIS — F51.04 PSYCHOPHYSIOLOGICAL INSOMNIA: ICD-10-CM

## 2025-06-03 LAB
ALCOHOL URINE: NORMAL
AMPHETAMINE SCREEN URINE: NORMAL
BARBITURATE SCREEN URINE: NORMAL
BENZODIAZEPINE SCREEN, URINE: NORMAL
BUPRENORPHINE URINE: NORMAL
COCAINE METABOLITE SCREEN URINE: NORMAL
FENTANYL SCREEN, URINE: NORMAL
GABAPENTIN SCREEN, URINE: NORMAL
MDMA, URINE: NORMAL
METHADONE SCREEN, URINE: NORMAL
METHAMPHETAMINE, URINE: NORMAL
OPIATE SCREEN URINE: NORMAL
OXYCODONE SCREEN URINE: NORMAL
PHENCYCLIDINE SCREEN URINE: NORMAL
PROPOXYPHENE SCREEN, URINE: NORMAL
SYNTHETIC CANNABINOIDS(K2) SCREEN, URINE: NORMAL
THC SCREEN, URINE: NORMAL
TRAMADOL SCREEN URINE: NORMAL
TRICYCLIC ANTIDEPRESSANTS, UR: NORMAL

## 2025-06-03 PROCEDURE — 80305 DRUG TEST PRSMV DIR OPT OBS: CPT | Performed by: NURSE PRACTITIONER

## 2025-06-03 PROCEDURE — 99214 OFFICE O/P EST MOD 30 MIN: CPT | Performed by: NURSE PRACTITIONER

## 2025-06-03 RX ORDER — PHENTERMINE HYDROCHLORIDE 37.5 MG/1
37.5 TABLET ORAL
Qty: 90 TABLET | Refills: 0 | Status: SHIPPED | OUTPATIENT
Start: 2025-06-09 | End: 2025-09-07

## 2025-06-03 RX ORDER — VILAZODONE HYDROCHLORIDE 40 MG/1
40 TABLET ORAL DAILY
Qty: 90 TABLET | Refills: 0 | Status: SHIPPED | OUTPATIENT
Start: 2025-06-03 | End: 2025-09-01

## 2025-06-03 ASSESSMENT — PATIENT HEALTH QUESTIONNAIRE - PHQ9
9. THOUGHTS THAT YOU WOULD BE BETTER OFF DEAD, OR OF HURTING YOURSELF: NOT AT ALL
SUM OF ALL RESPONSES TO PHQ QUESTIONS 1-9: 6
4. FEELING TIRED OR HAVING LITTLE ENERGY: SEVERAL DAYS
SUM OF ALL RESPONSES TO PHQ QUESTIONS 1-9: 6
5. POOR APPETITE OR OVEREATING: SEVERAL DAYS
8. MOVING OR SPEAKING SO SLOWLY THAT OTHER PEOPLE COULD HAVE NOTICED. OR THE OPPOSITE, BEING SO FIGETY OR RESTLESS THAT YOU HAVE BEEN MOVING AROUND A LOT MORE THAN USUAL: NOT AT ALL
SUM OF ALL RESPONSES TO PHQ QUESTIONS 1-9: 6
3. TROUBLE FALLING OR STAYING ASLEEP: NOT AT ALL
SUM OF ALL RESPONSES TO PHQ QUESTIONS 1-9: 6
2. FEELING DOWN, DEPRESSED OR HOPELESS: MORE THAN HALF THE DAYS
1. LITTLE INTEREST OR PLEASURE IN DOING THINGS: SEVERAL DAYS
10. IF YOU CHECKED OFF ANY PROBLEMS, HOW DIFFICULT HAVE THESE PROBLEMS MADE IT FOR YOU TO DO YOUR WORK, TAKE CARE OF THINGS AT HOME, OR GET ALONG WITH OTHER PEOPLE: SOMEWHAT DIFFICULT
7. TROUBLE CONCENTRATING ON THINGS, SUCH AS READING THE NEWSPAPER OR WATCHING TELEVISION: NOT AT ALL
6. FEELING BAD ABOUT YOURSELF - OR THAT YOU ARE A FAILURE OR HAVE LET YOURSELF OR YOUR FAMILY DOWN: SEVERAL DAYS

## 2025-06-03 NOTE — PROGRESS NOTES
Fani Mar (:  1975) is a 49 y.o. female,Established patient, here for evaluation of the following chief complaint(s):  Depression (SIX WEEK FOLLOW UP LAST PHQ 9)      Assessment & Plan  Mild episode of recurrent major depressive disorder   Chronic, not at goal (unstable), GENESIGHT TEST COMPLETED  WILL PRESCRIBE ACCORDINGLY AFTER RESULTS REVIEWED       Psychophysiological insomnia   Chronic, not at goal (unstable),    ENCOURAGED TO TAKE 2 TABLETS AT NIGHT - VISTARIL       Class 3 severe obesity with serious comorbidity and body mass index (BMI) of 60.0 to 69.9 in adult, unspecified obesity type (HCC)   Chronic, not at goal (unstable), continue current treatment plan    Orders:    phentermine (ADIPEX-P) 37.5 MG tablet; Take 1 tablet by mouth every morning (before breakfast) for 90 days. Max Daily Amount: 37.5 mg    vilazodone HCl (VIIBRYD) 40 MG TABS; Take 1 tablet by mouth daily    Venous insufficiency of both lower extremities    REFERRED TO VASCULAR SURGEON    Orders:    Pavan Whelan MD, Vascular/Endovascular Surgery, [facility]    High risk medication use    CONSISTENT    Orders:    POCT Rapid Drug Screen    Colon cancer screening    TEST ORDERED    Orders:    Fecal DNA Colorectal cancer screening (Cologuard)      Assessment & Plan  1. Depression.  - PHQ score has decreased from 9 to 6, indicating some improvement.  - Reports a small improvement with Viibryd but continues to struggle with mood, especially on weekends.  - GeneSight test will be ordered to optimize medication regimen.  - Current Viibryd prescription will be continued; adjustments will be made based on GeneSight test results.    2. Weight management.  - Successfully lost 19 pounds and aims to reach 350 pounds in the next 3 months.  - Reports that Adipex has helped suppress appetite, although experiences occasional dry mouth.  - Adipex prescription will be continued; drug screen and medication contract required due to

## 2025-06-10 DIAGNOSIS — E66.813 CLASS 3 SEVERE OBESITY WITH SERIOUS COMORBIDITY AND BODY MASS INDEX (BMI) OF 60.0 TO 69.9 IN ADULT, UNSPECIFIED OBESITY TYPE (HCC): ICD-10-CM

## 2025-06-10 RX ORDER — PHENTERMINE HYDROCHLORIDE 37.5 MG/1
37.5 CAPSULE ORAL EVERY MORNING
Qty: 30 CAPSULE | OUTPATIENT
Start: 2025-06-10

## 2025-07-31 ENCOUNTER — TELEPHONE (OUTPATIENT)
Dept: FAMILY MEDICINE CLINIC | Age: 50
End: 2025-07-31

## 2025-07-31 DIAGNOSIS — I87.2 VENOUS INSUFFICIENCY OF BOTH LOWER EXTREMITIES: ICD-10-CM

## 2025-07-31 RX ORDER — FUROSEMIDE 20 MG/1
TABLET ORAL
Qty: 45 TABLET | Refills: 0 | Status: SHIPPED | OUTPATIENT
Start: 2025-07-31

## 2025-08-20 ENCOUNTER — OFFICE VISIT (OUTPATIENT)
Dept: FAMILY MEDICINE CLINIC | Age: 50
End: 2025-08-20
Payer: COMMERCIAL

## 2025-08-20 VITALS
WEIGHT: 293 LBS | HEIGHT: 65 IN | BODY MASS INDEX: 48.82 KG/M2 | SYSTOLIC BLOOD PRESSURE: 138 MMHG | DIASTOLIC BLOOD PRESSURE: 84 MMHG | OXYGEN SATURATION: 98 % | HEART RATE: 84 BPM

## 2025-08-20 DIAGNOSIS — R10.31 RLQ ABDOMINAL PAIN: Primary | ICD-10-CM

## 2025-08-20 DIAGNOSIS — N30.01 ACUTE CYSTITIS WITH HEMATURIA: ICD-10-CM

## 2025-08-20 DIAGNOSIS — K59.1 FUNCTIONAL DIARRHEA: ICD-10-CM

## 2025-08-20 DIAGNOSIS — E66.813 CLASS 3 SEVERE OBESITY WITHOUT SERIOUS COMORBIDITY WITH BODY MASS INDEX (BMI) OF 60.0 TO 69.9 IN ADULT, UNSPECIFIED OBESITY TYPE (HCC): ICD-10-CM

## 2025-08-20 DIAGNOSIS — N91.2 AMENORRHEA: ICD-10-CM

## 2025-08-20 DIAGNOSIS — R39.15 URINARY URGENCY: ICD-10-CM

## 2025-08-20 DIAGNOSIS — Z12.31 ENCOUNTER FOR SCREENING MAMMOGRAM FOR MALIGNANT NEOPLASM OF BREAST: ICD-10-CM

## 2025-08-20 LAB
BILIRUBIN, POC: ABNORMAL
BLOOD URINE, POC: ABNORMAL
CLARITY, POC: ABNORMAL
COLOR, POC: ABNORMAL
GLUCOSE URINE, POC: ABNORMAL MG/DL
KETONES, POC: ABNORMAL MG/DL
LEUKOCYTE EST, POC: ABNORMAL
NITRITE, POC: ABNORMAL
PH, POC: 6
PROTEIN, POC: ABNORMAL MG/DL
SPECIFIC GRAVITY, POC: 1.02
UROBILINOGEN, POC: 0.2 MG/DL

## 2025-08-20 PROCEDURE — 99214 OFFICE O/P EST MOD 30 MIN: CPT | Performed by: REGISTERED NURSE

## 2025-08-20 PROCEDURE — 36415 COLL VENOUS BLD VENIPUNCTURE: CPT | Performed by: REGISTERED NURSE

## 2025-08-20 PROCEDURE — 81002 URINALYSIS NONAUTO W/O SCOPE: CPT | Performed by: REGISTERED NURSE

## 2025-08-20 RX ORDER — NITROFURANTOIN 25; 75 MG/1; MG/1
100 CAPSULE ORAL 2 TIMES DAILY
Qty: 20 CAPSULE | Refills: 0 | Status: SHIPPED | OUTPATIENT
Start: 2025-08-20 | End: 2025-08-30

## 2025-08-20 ASSESSMENT — ENCOUNTER SYMPTOMS
BLOOD IN STOOL: 0
CONSTIPATION: 0
ABDOMINAL PAIN: 1
VOMITING: 0
DIARRHEA: 1
NAUSEA: 1
ABDOMINAL DISTENTION: 0

## 2025-08-21 LAB
ALBUMIN SERPL-MCNC: 3.8 G/DL (ref 3.4–5)
ALBUMIN/GLOB SERPL: 1.3 {RATIO} (ref 1.1–2.2)
ALP SERPL-CCNC: 99 U/L (ref 40–129)
ALT SERPL-CCNC: 18 U/L (ref 10–40)
ANION GAP SERPL CALCULATED.3IONS-SCNC: 14 MMOL/L (ref 3–16)
AST SERPL-CCNC: 14 U/L (ref 15–37)
B-HCG SERPL EIA 3RD IS-ACNC: <5 MIU/ML
BACTERIA UR CULT: ABNORMAL
BACTERIA UR CULT: ABNORMAL
BASOPHILS # BLD: 0.1 K/UL (ref 0–0.2)
BASOPHILS NFR BLD: 1.1 %
BILIRUB SERPL-MCNC: 0.6 MG/DL (ref 0–1)
BUN SERPL-MCNC: 12 MG/DL (ref 7–20)
CALCIUM SERPL-MCNC: 9 MG/DL (ref 8.3–10.6)
CHLORIDE SERPL-SCNC: 103 MMOL/L (ref 99–110)
CO2 SERPL-SCNC: 22 MMOL/L (ref 21–32)
CREAT SERPL-MCNC: 1.1 MG/DL (ref 0.6–1.1)
DEPRECATED RDW RBC AUTO: 16 % (ref 12.4–15.4)
EOSINOPHIL # BLD: 0.1 K/UL (ref 0–0.6)
EOSINOPHIL NFR BLD: 1.6 %
EST. AVERAGE GLUCOSE BLD GHB EST-MCNC: 139.9 MG/DL
GFR SERPLBLD CREATININE-BSD FMLA CKD-EPI: 61 ML/MIN/{1.73_M2}
GLUCOSE SERPL-MCNC: 112 MG/DL (ref 70–99)
HBA1C MFR BLD: 6.5 %
HCT VFR BLD AUTO: 35.7 % (ref 36–48)
HGB BLD-MCNC: 12.2 G/DL (ref 12–16)
LIPASE SERPL-CCNC: 15 U/L (ref 13–60)
LYMPHOCYTES # BLD: 1.2 K/UL (ref 1–5.1)
LYMPHOCYTES NFR BLD: 13.1 %
MCH RBC QN AUTO: 24.8 PG (ref 26–34)
MCHC RBC AUTO-ENTMCNC: 34 G/DL (ref 31–36)
MCV RBC AUTO: 72.9 FL (ref 80–100)
MONOCYTES # BLD: 0.8 K/UL (ref 0–1.3)
MONOCYTES NFR BLD: 9 %
NEUTROPHILS # BLD: 6.9 K/UL (ref 1.7–7.7)
NEUTROPHILS NFR BLD: 75.2 %
ORGANISM: ABNORMAL
PLATELET # BLD AUTO: 355 K/UL (ref 135–450)
PMV BLD AUTO: 9 FL (ref 5–10.5)
POTASSIUM SERPL-SCNC: 4.1 MMOL/L (ref 3.5–5.1)
PROT SERPL-MCNC: 6.8 G/DL (ref 6.4–8.2)
RBC # BLD AUTO: 4.9 M/UL (ref 4–5.2)
SODIUM SERPL-SCNC: 139 MMOL/L (ref 136–145)
WBC # BLD AUTO: 9.2 K/UL (ref 4–11)

## 2025-08-23 DIAGNOSIS — D64.9 ANEMIA, UNSPECIFIED TYPE: ICD-10-CM

## 2025-08-23 LAB
FERRITIN SERPL IA-MCNC: 65 NG/ML (ref 15–150)
IRON SATN MFR SERPL: 6 % (ref 15–50)
IRON SERPL-MCNC: 21 UG/DL (ref 37–145)
TIBC SERPL-MCNC: 355 UG/DL (ref 260–445)

## 2025-08-27 ENCOUNTER — OFFICE VISIT (OUTPATIENT)
Dept: VASCULAR SURGERY | Age: 50
End: 2025-08-27
Payer: COMMERCIAL

## 2025-08-27 VITALS
HEART RATE: 109 BPM | SYSTOLIC BLOOD PRESSURE: 147 MMHG | WEIGHT: 293 LBS | HEIGHT: 65 IN | BODY MASS INDEX: 48.82 KG/M2 | DIASTOLIC BLOOD PRESSURE: 89 MMHG

## 2025-08-27 DIAGNOSIS — I87.2 VENOUS INSUFFICIENCY: ICD-10-CM

## 2025-08-27 DIAGNOSIS — M79.89 LEG SWELLING: ICD-10-CM

## 2025-08-27 DIAGNOSIS — L97.929 VENOUS ULCER OF LEFT LEG (HCC): Primary | ICD-10-CM

## 2025-08-27 DIAGNOSIS — D64.9 ANEMIA, UNSPECIFIED TYPE: Primary | ICD-10-CM

## 2025-08-27 DIAGNOSIS — I83.029 VENOUS ULCER OF LEFT LEG (HCC): Primary | ICD-10-CM

## 2025-08-27 PROCEDURE — 11042 DBRDMT SUBQ TIS 1ST 20SQCM/<: CPT | Performed by: NURSE PRACTITIONER

## 2025-08-27 PROCEDURE — 99203 OFFICE O/P NEW LOW 30 MIN: CPT | Performed by: NURSE PRACTITIONER

## 2025-08-27 RX ORDER — FERROUS SULFATE 325(65) MG
325 TABLET ORAL
Qty: 120 TABLET | Refills: 0 | Status: SHIPPED | OUTPATIENT
Start: 2025-08-27 | End: 2025-12-25

## 2025-09-01 PROBLEM — L97.929 VENOUS ULCER OF LEFT LEG (HCC): Status: ACTIVE | Noted: 2025-09-01

## 2025-09-01 PROBLEM — I87.2 VENOUS INSUFFICIENCY: Status: ACTIVE | Noted: 2025-09-01

## 2025-09-01 PROBLEM — I83.029 VENOUS ULCER OF LEFT LEG (HCC): Status: ACTIVE | Noted: 2025-09-01

## 2025-09-01 PROBLEM — M79.89 LEG SWELLING: Status: ACTIVE | Noted: 2024-10-25

## 2025-09-01 ASSESSMENT — ENCOUNTER SYMPTOMS: COLOR CHANGE: 1

## 2025-09-03 ENCOUNTER — OFFICE VISIT (OUTPATIENT)
Dept: VASCULAR SURGERY | Age: 50
End: 2025-09-03
Payer: COMMERCIAL

## 2025-09-03 ENCOUNTER — OFFICE VISIT (OUTPATIENT)
Dept: FAMILY MEDICINE CLINIC | Age: 50
End: 2025-09-03
Payer: COMMERCIAL

## 2025-09-03 ENCOUNTER — PATIENT MESSAGE (OUTPATIENT)
Dept: FAMILY MEDICINE CLINIC | Age: 50
End: 2025-09-03

## 2025-09-03 VITALS
BODY MASS INDEX: 48.82 KG/M2 | HEIGHT: 65 IN | OXYGEN SATURATION: 96 % | HEART RATE: 104 BPM | DIASTOLIC BLOOD PRESSURE: 86 MMHG | WEIGHT: 293 LBS | SYSTOLIC BLOOD PRESSURE: 138 MMHG

## 2025-09-03 VITALS
HEIGHT: 65 IN | WEIGHT: 293 LBS | SYSTOLIC BLOOD PRESSURE: 148 MMHG | DIASTOLIC BLOOD PRESSURE: 92 MMHG | BODY MASS INDEX: 48.82 KG/M2 | HEART RATE: 109 BPM

## 2025-09-03 DIAGNOSIS — E66.813 CLASS 3 SEVERE OBESITY WITH SERIOUS COMORBIDITY AND BODY MASS INDEX (BMI) OF 60.0 TO 69.9 IN ADULT, UNSPECIFIED OBESITY TYPE (HCC): ICD-10-CM

## 2025-09-03 DIAGNOSIS — Z00.00 ANNUAL PHYSICAL EXAM: Primary | ICD-10-CM

## 2025-09-03 DIAGNOSIS — I87.2 VENOUS INSUFFICIENCY: ICD-10-CM

## 2025-09-03 DIAGNOSIS — I83.029 VENOUS ULCER OF LEFT LEG (HCC): Primary | ICD-10-CM

## 2025-09-03 DIAGNOSIS — G89.29 CHRONIC PAIN OF BOTH KNEES: ICD-10-CM

## 2025-09-03 DIAGNOSIS — M19.90 ARTHRITIS: ICD-10-CM

## 2025-09-03 DIAGNOSIS — M79.89 LEG SWELLING: ICD-10-CM

## 2025-09-03 DIAGNOSIS — Z13.220 LIPID SCREENING: ICD-10-CM

## 2025-09-03 DIAGNOSIS — M25.562 CHRONIC PAIN OF BOTH KNEES: ICD-10-CM

## 2025-09-03 DIAGNOSIS — M25.561 CHRONIC PAIN OF BOTH KNEES: ICD-10-CM

## 2025-09-03 DIAGNOSIS — R20.2 LEFT HAND PARESTHESIA: ICD-10-CM

## 2025-09-03 DIAGNOSIS — L97.929 VENOUS ULCER OF LEFT LEG (HCC): Primary | ICD-10-CM

## 2025-09-03 DIAGNOSIS — Z12.31 ENCOUNTER FOR SCREENING MAMMOGRAM FOR MALIGNANT NEOPLASM OF BREAST: ICD-10-CM

## 2025-09-03 DIAGNOSIS — E11.9 TYPE 2 DIABETES MELLITUS WITHOUT COMPLICATION, WITHOUT LONG-TERM CURRENT USE OF INSULIN (HCC): ICD-10-CM

## 2025-09-03 LAB
CHOLEST SERPL-MCNC: 170 MG/DL (ref 0–199)
CREAT UR-MCNC: 64.3 MG/DL (ref 28–259)
HDLC SERPL-MCNC: 48 MG/DL (ref 40–60)
LDL CHOLESTEROL: 102 MG/DL
MICROALBUMIN UR DL<=1MG/L-MCNC: <1.2 MG/DL
MICROALBUMIN/CREAT UR: NORMAL MG/G (ref 0–30)
TRIGL SERPL-MCNC: 99 MG/DL (ref 0–150)
VLDLC SERPL CALC-MCNC: 20 MG/DL

## 2025-09-03 PROCEDURE — 20610 DRAIN/INJ JOINT/BURSA W/O US: CPT | Performed by: NURSE PRACTITIONER

## 2025-09-03 PROCEDURE — 11042 DBRDMT SUBQ TIS 1ST 20SQCM/<: CPT | Performed by: NURSE PRACTITIONER

## 2025-09-03 PROCEDURE — 99214 OFFICE O/P EST MOD 30 MIN: CPT | Performed by: NURSE PRACTITIONER

## 2025-09-03 PROCEDURE — 99396 PREV VISIT EST AGE 40-64: CPT | Performed by: NURSE PRACTITIONER

## 2025-09-03 RX ORDER — TOPIRAMATE 25 MG/1
25 TABLET, FILM COATED ORAL NIGHTLY
Qty: 60 TABLET | Refills: 3 | Status: SHIPPED | OUTPATIENT
Start: 2025-09-03

## 2025-09-03 RX ORDER — BUPIVACAINE HYDROCHLORIDE 5 MG/ML
1 INJECTION, SOLUTION EPIDURAL; INTRACAUDAL; PERINEURAL ONCE
Status: COMPLETED | OUTPATIENT
Start: 2025-09-03 | End: 2025-09-03

## 2025-09-03 RX ORDER — PHENTERMINE HYDROCHLORIDE 37.5 MG/1
37.5 TABLET ORAL
Qty: 90 TABLET | Refills: 0 | Status: SHIPPED | OUTPATIENT
Start: 2025-09-14 | End: 2025-12-13

## 2025-09-03 RX ORDER — METHYLPREDNISOLONE ACETATE 80 MG/ML
80 INJECTION, SUSPENSION INTRA-ARTICULAR; INTRALESIONAL; INTRAMUSCULAR; SOFT TISSUE ONCE
Status: COMPLETED | OUTPATIENT
Start: 2025-09-03 | End: 2025-09-03

## 2025-09-03 RX ORDER — GLIPIZIDE 5 MG/1
5 TABLET ORAL 2 TIMES DAILY
Qty: 60 TABLET | Refills: 3 | Status: SHIPPED | OUTPATIENT
Start: 2025-09-03

## 2025-09-03 RX ORDER — HYDROCHLOROTHIAZIDE 12.5 MG/1
CAPSULE ORAL
Qty: 6 EACH | Refills: 3 | Status: SHIPPED | OUTPATIENT
Start: 2025-09-03

## 2025-09-03 RX ORDER — CHOLECALCIFEROL (VITAMIN D3) 1250 MCG
CAPSULE ORAL
COMMUNITY

## 2025-09-03 RX ADMIN — BUPIVACAINE HYDROCHLORIDE 5 MG: 5 INJECTION, SOLUTION EPIDURAL; INTRACAUDAL; PERINEURAL at 10:42

## 2025-09-03 RX ADMIN — METHYLPREDNISOLONE ACETATE 80 MG: 80 INJECTION, SUSPENSION INTRA-ARTICULAR; INTRALESIONAL; INTRAMUSCULAR; SOFT TISSUE at 10:43

## 2025-09-03 RX ADMIN — BUPIVACAINE HYDROCHLORIDE 5 MG: 5 INJECTION, SOLUTION EPIDURAL; INTRACAUDAL; PERINEURAL at 10:41
